# Patient Record
Sex: FEMALE | Race: WHITE | NOT HISPANIC OR LATINO | Employment: OTHER | URBAN - METROPOLITAN AREA
[De-identification: names, ages, dates, MRNs, and addresses within clinical notes are randomized per-mention and may not be internally consistent; named-entity substitution may affect disease eponyms.]

---

## 2017-02-01 RX ORDER — DONEPEZIL HYDROCHLORIDE 10 MG/1
TABLET, FILM COATED ORAL
Qty: 30 TABLET | Refills: 2 | Status: SHIPPED | OUTPATIENT
Start: 2017-02-01 | End: 2017-05-29 | Stop reason: SDUPTHER

## 2017-02-22 RX ORDER — ALENDRONATE SODIUM 70 MG/1
70 TABLET ORAL WEEKLY
Qty: 4 TABLET | Refills: 2 | Status: SHIPPED | OUTPATIENT
Start: 2017-02-22 | End: 2017-05-16 | Stop reason: SDUPTHER

## 2017-03-02 RX ORDER — PRAVASTATIN SODIUM 40 MG/1
TABLET ORAL
Qty: 30 TABLET | Refills: 0 | Status: SHIPPED | OUTPATIENT
Start: 2017-03-02 | End: 2017-04-21 | Stop reason: SDUPTHER

## 2017-03-08 ENCOUNTER — TELEPHONE (OUTPATIENT)
Dept: FAMILY MEDICINE | Facility: CLINIC | Age: 77
End: 2017-03-08

## 2017-03-08 DIAGNOSIS — Z12.31 ENCOUNTER FOR SCREENING MAMMOGRAM FOR MALIGNANT NEOPLASM OF BREAST: Primary | ICD-10-CM

## 2017-03-08 NOTE — TELEPHONE ENCOUNTER
----- Message from RT Wilner sent at 3/8/2017  7:51 AM CST -----  This pt is scheduled for a mammogram on 3-15-17.  There is no order in the system. Would you please check on this and put an order in and link to appt?  Please call if you have any questions #[phone]. Thank you for taking care of this.

## 2017-03-15 ENCOUNTER — HOSPITAL ENCOUNTER (OUTPATIENT)
Dept: RADIOLOGY | Facility: HOSPITAL | Age: 77
Discharge: HOME | End: 2017-03-15
Attending: FAMILY MEDICINE
Payer: MEDICARE

## 2017-03-15 DIAGNOSIS — Z12.31 ENCOUNTER FOR SCREENING MAMMOGRAM FOR MALIGNANT NEOPLASM OF BREAST: ICD-10-CM

## 2017-03-15 PROCEDURE — 77067 SCR MAMMO BI INCL CAD: CPT | Mod: TC

## 2017-03-15 PROCEDURE — 77067 SCR MAMMO BI INCL CAD: CPT | Mod: 26,,, | Performed by: RADIOLOGY

## 2017-03-15 PROCEDURE — 77063 BREAST TOMOSYNTHESIS BI: CPT | Mod: 26,,, | Performed by: RADIOLOGY

## 2017-04-07 ENCOUNTER — PATIENT OUTREACH (OUTPATIENT)
Dept: ADMINISTRATIVE | Facility: HOSPITAL | Age: 77
End: 2017-04-07

## 2017-04-21 ENCOUNTER — OFFICE VISIT (OUTPATIENT)
Dept: FAMILY MEDICINE | Facility: CLINIC | Age: 77
End: 2017-04-21
Payer: MEDICARE

## 2017-04-21 ENCOUNTER — LAB VISIT (OUTPATIENT)
Dept: LAB | Facility: HOSPITAL | Age: 77
End: 2017-04-21
Attending: FAMILY MEDICINE
Payer: MEDICARE

## 2017-04-21 VITALS
DIASTOLIC BLOOD PRESSURE: 82 MMHG | SYSTOLIC BLOOD PRESSURE: 135 MMHG | HEIGHT: 63 IN | TEMPERATURE: 98 F | WEIGHT: 149.94 LBS | RESPIRATION RATE: 18 BRPM | BODY MASS INDEX: 26.57 KG/M2 | HEART RATE: 78 BPM

## 2017-04-21 DIAGNOSIS — E78.5 HYPERLIPIDEMIA, UNSPECIFIED HYPERLIPIDEMIA TYPE: Primary | ICD-10-CM

## 2017-04-21 DIAGNOSIS — G31.84 MILD COGNITIVE IMPAIRMENT: ICD-10-CM

## 2017-04-21 DIAGNOSIS — Z78.0 POSTMENOPAUSAL: ICD-10-CM

## 2017-04-21 DIAGNOSIS — Z01.419 WELL FEMALE EXAM WITH ROUTINE GYNECOLOGICAL EXAM: ICD-10-CM

## 2017-04-21 DIAGNOSIS — M85.80 OSTEOPENIA, UNSPECIFIED LOCATION: ICD-10-CM

## 2017-04-21 DIAGNOSIS — E78.5 HYPERLIPIDEMIA, UNSPECIFIED HYPERLIPIDEMIA TYPE: ICD-10-CM

## 2017-04-21 LAB
ALBUMIN SERPL BCP-MCNC: 4.1 G/DL
ALP SERPL-CCNC: 57 U/L
ALT SERPL W/O P-5'-P-CCNC: 29 U/L
ANION GAP SERPL CALC-SCNC: 12 MMOL/L
AST SERPL-CCNC: 27 U/L
BASOPHILS # BLD AUTO: 0.11 K/UL
BASOPHILS NFR BLD: 1.6 %
BILIRUB SERPL-MCNC: 0.4 MG/DL
BUN SERPL-MCNC: 13 MG/DL
CALCIUM SERPL-MCNC: 9.4 MG/DL
CHLORIDE SERPL-SCNC: 106 MMOL/L
CHOLEST/HDLC SERPL: 3.4 {RATIO}
CO2 SERPL-SCNC: 22 MMOL/L
CREAT SERPL-MCNC: 1.1 MG/DL
DIFFERENTIAL METHOD: NORMAL
EOSINOPHIL # BLD AUTO: 0.4 K/UL
EOSINOPHIL NFR BLD: 5.5 %
ERYTHROCYTE [DISTWIDTH] IN BLOOD BY AUTOMATED COUNT: 14.1 %
EST. GFR  (AFRICAN AMERICAN): 56.4 ML/MIN/1.73 M^2
EST. GFR  (NON AFRICAN AMERICAN): 48.9 ML/MIN/1.73 M^2
GLUCOSE SERPL-MCNC: 88 MG/DL
HCT VFR BLD AUTO: 41.7 %
HDL/CHOLESTEROL RATIO: 29.5 %
HDLC SERPL-MCNC: 183 MG/DL
HDLC SERPL-MCNC: 54 MG/DL
HGB BLD-MCNC: 13.7 G/DL
LDLC SERPL CALC-MCNC: 92 MG/DL
LYMPHOCYTES # BLD AUTO: 1.8 K/UL
LYMPHOCYTES NFR BLD: 24.9 %
MCH RBC QN AUTO: 30 PG
MCHC RBC AUTO-ENTMCNC: 32.9 %
MCV RBC AUTO: 91 FL
MONOCYTES # BLD AUTO: 0.4 K/UL
MONOCYTES NFR BLD: 5.3 %
NEUTROPHILS # BLD AUTO: 4.4 K/UL
NEUTROPHILS NFR BLD: 62.4 %
NONHDLC SERPL-MCNC: 129 MG/DL
PLATELET # BLD AUTO: 261 K/UL
PMV BLD AUTO: 11 FL
POTASSIUM SERPL-SCNC: 4.3 MMOL/L
PROT SERPL-MCNC: 8.1 G/DL
RBC # BLD AUTO: 4.56 M/UL
SODIUM SERPL-SCNC: 140 MMOL/L
TRIGL SERPL-MCNC: 185 MG/DL
TSH SERPL DL<=0.005 MIU/L-ACNC: 2.85 UIU/ML
WBC # BLD AUTO: 7.03 K/UL

## 2017-04-21 PROCEDURE — 84443 ASSAY THYROID STIM HORMONE: CPT

## 2017-04-21 PROCEDURE — 36415 COLL VENOUS BLD VENIPUNCTURE: CPT | Mod: PO

## 2017-04-21 PROCEDURE — 80061 LIPID PANEL: CPT

## 2017-04-21 PROCEDURE — G0101 CA SCREEN;PELVIC/BREAST EXAM: HCPCS | Mod: S$PBB,,, | Performed by: FAMILY MEDICINE

## 2017-04-21 PROCEDURE — 99999 PR PBB SHADOW E&M-EST. PATIENT-LVL III: CPT | Mod: PBBFAC,,, | Performed by: FAMILY MEDICINE

## 2017-04-21 PROCEDURE — 80053 COMPREHEN METABOLIC PANEL: CPT

## 2017-04-21 PROCEDURE — 85025 COMPLETE CBC W/AUTO DIFF WBC: CPT

## 2017-04-21 PROCEDURE — 99397 PER PM REEVAL EST PAT 65+ YR: CPT | Mod: 25,S$PBB,, | Performed by: FAMILY MEDICINE

## 2017-04-21 NOTE — PROGRESS NOTES
CHIEF COMPLAINT: This is a 76 year old female here for preventive health exam.    SUBJECTIVE: Patient reports that she is doing well and has no complaints. She continues to take Aricept for mild cognitive impairment. She drives infrequently. 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  She exercises by walking with her  around the neighborhood.     Eye exam November 2016. Mammogram March 2017. Bone DEXA scan November 2013, due again in November 2016. Pap smear October 2009. Pelvic exam March 2016. Colonoscopy January 2009, due again in January 2019. Tdap January 2012. Pneumovax October 2008. Flu vaccine October 2015. Prevnar July 2015.     ROS:  GENERAL: Patient denies fever, chills, night sweats. Patient denies weight gain or loss. Patient denies anorexia, fatigue, weakness or swollen glands.  SKIN: Patient denies rash or hair loss.  HEENT: Patient denies sore throat, ear pain, hearing loss, nasal congestion, or runny nose. Patient denies visual disturbance, eye irritation or discharge.  LUNGS: Patient denies cough, wheeze or hemoptysis.  CARDIOVASCULAR: Patient denies chest pain, shortness of breath, palpitations, syncope or lower extremity edema.  GI: Patient denies abdominal pain, nausea, vomiting, diarrhea, constipation, blood in stool or melena.  GENITOURINARY: Patient denies pelvic pain, vaginal discharge, itch or odor. Patient denies irregular vaginal bleeding. Patient denies dysuria, frequency, hematuria, nocturia, urgency or incontinence.  BREASTS: Patient denies breast pain, mass or nipple discharge.  MUSCULOSKELETAL: Patient denies joint pain, swelling, redness or warmth.  NEUROLOGIC: Patient denies headache, vertigo, paresthesias, weakness in limb, dysarthria, dysphagia or abnormality of gait.  PSYCHIATRIC: Patient denies anxiety, depression, or memory loss.     OBJECTIVE:   GENERAL: Well-developed well-nourished, overweight  elderly, white female alert and oriented x3, in no acute distress.  Mild cognitive impairment.  No hallucinations or delusions  SKIN: Clear without rash. Normal color and tone.  HEENT: Eyes: Clear conjunctivae. No scleral icterus. Pupils equal reactive to light and accommodation. Ears: Hearing aids. Clear TMs. Clear canals. Nose: Without congestion. Pharynx: Without injection or exudates.  NECK: Supple, normal range of motion. No masses, lymphadenopathy or enlarged thyroid. No JVD. Carotids 2+ and equal. No bruits.  LUNGS: Clear to auscultation. Normal respiratory effort.  CARDIOVASCULAR: Regular rhythm, normal S1, S2 without murmur, gallop or rub.  BACK: No CVA or spinal tenderness.  BREASTS: No masses, tenderness or nipple discharge.  ABDOMEN: Normal appearance. Active bowel sounds. Soft, nontender without mass or organomegaly. No rebound or guarding.  EXTREMITIES: Without cyanosis, clubbing or edema. Distal pulses 2+ and equal. Normal range of motion in all extremities. No joint effusion, erythema or warmth.  NEUROLOGIC: Cranial nerves II through XII without deficit. Motor strength equal bilaterally. Sensation normal to touch. Deep tendon reflexes 2+ and equal. Gait without abnormality. No tremor. Negative cerebellar signs.  PELVIC: External: Without lesions or inflammation. Vaginal: Atrophic changes. Cervix: Nontender. Uterus: Normal size and shape. Adnexa: Non-tender, without masses. Rectovaginal: Confirms, heme-negative stool x2.     ASSESSMENT:  1. Hyperlipidemia, unspecified hyperlipidemia type    2. Mild cognitive impairment    3. Osteopenia, unspecified location    4. Postmenopausal      PLAN:   1.  Weight reduction.  Exercise regularly.  2.  Age-appropriate counseling.  3.  Fasting lab.  4.  Bone DEXA scan.

## 2017-04-21 NOTE — MR AVS SNAPSHOT
[facility]  [address]  [place] [address]  Phone: [phone]                  Vanessa Waddelldict   2017 9:00 AM   Office Visit    Description:  Female : 1940   Provider:  Magui Jc MD   Department:  [facility]           Reason for Visit     Annual Exam           Diagnoses this Visit        Comments    Hyperlipidemia, unspecified hyperlipidemia type    -  Primary     Mild cognitive impairment         Osteopenia, unspecified location         Postmenopausal                To Do List           Future Appointments        Provider Department Dept Phone    2017 11:10 AM LABORATORY, [address] [facility]  [phone]    2017 11:00 AM [facility] [phone]    2017 1:45 PM Marielena Reese, [facility] - Ophthalmology [phone]      Goals (5 Years of Data)     None      [facility] On Call     [facility] Nurse Care Line -  Assistance  Unless otherwise directed by your provider, please contact [facility], our nurse care line that is available for  assistance.     Registered nurses in the [facility] Center provide: appointment scheduling, clinical advisement, health education, and other advisory services.  Call: [phone] (toll free)               Medications           Message regarding Medications     Verify the changes and/or additions to your medication regime listed below are the same as discussed with your clinician today.  If any of these changes or additions are incorrect, please notify your healthcare provider.             Verify that the below list of medications is an accurate representation of the medications you are currently taking.  If none reported, the list may be blank. If incorrect, please contact your healthcare provider. Carry this list with you in case of emergency.           Current Medications     alendronate (FOSAMAX) 70 MG  "tablet Take 1 tablet (70 mg total) by mouth once a week.    aspirin (ECOTRIN) 81 MG EC tablet Take 81 mg by mouth once daily.    calcium carbonate-vitamin D2 600 mg calcium- 200 unit Cap Take by mouth.    donepezil (ARICEPT) 10 MG tablet TAKE ONE TABLET BY MOUTH ONE TIME DAILY (need annual exam)    pravastatin (PRAVACHOL) 40 MG tablet TAKE ONE TABLET BY MOUTH ONE TIME DAILY            Clinical Reference Information           Your Vitals Were     BP Pulse Temp Resp Height Weight    135/82 78 98 °F (36.7 °C) (Tympanic) 18 5' 2.5" (1.588 m) 68 kg (149 lb 14.6 oz)    BMI                26.98 kg/m2          Blood Pressure          Most Recent Value    BP  135/82      Allergies as of 4/21/2017     No Known Allergies      Immunizations Administered on Date of Encounter - 4/21/2017     None      Orders Placed During Today's Visit     Future Labs/Procedures Expected by Expires    CBC auto differential  4/21/2017 6/20/2018    Comprehensive metabolic panel  4/21/2017 6/20/2018    DXA Bone Density Spine And Hip  4/21/2017 4/21/2018    Lipid panel  4/21/2017 6/20/2018    TSH  4/21/2017 6/20/2018      Language Assistance Services     ATTENTION: Language assistance services are available, free of charge. Please call [phone].      ATENCIÓN: Si toyala aleksander, tiene a sprague disposición servicios gratuitos de asistencia lingüística. Llame al [phone].     [facility] Ý: N?u b?n nói Ti?ng Vi?t, có các d?ch v? h? tr? ngôn ng? mi?n phí dành cho b?n. G?i s? [phone].         [facility] complies with applicable Federal civil rights laws and does not discriminate on the basis of race, color, national origin, age, disability, or sex.        "

## 2017-04-22 RX ORDER — PRAVASTATIN SODIUM 40 MG/1
TABLET ORAL
Qty: 30 TABLET | Refills: 11 | Status: SHIPPED | OUTPATIENT
Start: 2017-04-22 | End: 2018-04-01 | Stop reason: SDUPTHER

## 2017-05-08 ENCOUNTER — APPOINTMENT (OUTPATIENT)
Dept: RADIOLOGY | Facility: CLINIC | Age: 77
End: 2017-05-08
Attending: FAMILY MEDICINE
Payer: MEDICARE

## 2017-05-08 DIAGNOSIS — Z78.0 POSTMENOPAUSAL: ICD-10-CM

## 2017-05-08 PROCEDURE — 77080 DXA BONE DENSITY AXIAL: CPT | Mod: 26,,, | Performed by: INTERNAL MEDICINE

## 2017-05-08 PROCEDURE — 77080 DXA BONE DENSITY AXIAL: CPT | Mod: TC,PO

## 2017-05-16 RX ORDER — ALENDRONATE SODIUM 70 MG/1
70 TABLET ORAL WEEKLY
Qty: 4 TABLET | Refills: 11 | Status: SHIPPED | OUTPATIENT
Start: 2017-05-16 | End: 2018-05-08 | Stop reason: SDUPTHER

## 2017-05-29 RX ORDER — DONEPEZIL HYDROCHLORIDE 10 MG/1
TABLET, FILM COATED ORAL
Qty: 30 TABLET | Refills: 11 | Status: SHIPPED | OUTPATIENT
Start: 2017-05-29 | End: 2018-05-18 | Stop reason: SDUPTHER

## 2017-08-18 ENCOUNTER — OFFICE VISIT (OUTPATIENT)
Dept: FAMILY MEDICINE | Facility: CLINIC | Age: 77
End: 2017-08-18
Payer: MEDICARE

## 2017-08-18 VITALS
TEMPERATURE: 99 F | WEIGHT: 149.69 LBS | HEART RATE: 74 BPM | DIASTOLIC BLOOD PRESSURE: 87 MMHG | SYSTOLIC BLOOD PRESSURE: 135 MMHG | BODY MASS INDEX: 26.52 KG/M2 | HEIGHT: 63 IN | RESPIRATION RATE: 18 BRPM

## 2017-08-18 DIAGNOSIS — F03.90 DEMENTIA WITHOUT BEHAVIORAL DISTURBANCE, UNSPECIFIED DEMENTIA TYPE: Primary | ICD-10-CM

## 2017-08-18 DIAGNOSIS — M85.80 OSTEOPENIA, UNSPECIFIED LOCATION: ICD-10-CM

## 2017-08-18 DIAGNOSIS — N18.30 CKD (CHRONIC KIDNEY DISEASE), STAGE III: ICD-10-CM

## 2017-08-18 DIAGNOSIS — E78.2 MIXED HYPERLIPIDEMIA: ICD-10-CM

## 2017-08-18 PROCEDURE — 1126F AMNT PAIN NOTED NONE PRSNT: CPT | Mod: ,,, | Performed by: FAMILY MEDICINE

## 2017-08-18 PROCEDURE — 99999 PR PBB SHADOW E&M-EST. PATIENT-LVL III: CPT | Mod: PBBFAC,,, | Performed by: FAMILY MEDICINE

## 2017-08-18 PROCEDURE — 1159F MED LIST DOCD IN RCRD: CPT | Mod: ,,, | Performed by: FAMILY MEDICINE

## 2017-08-18 PROCEDURE — 99214 OFFICE O/P EST MOD 30 MIN: CPT | Mod: S$PBB,,, | Performed by: FAMILY MEDICINE

## 2017-08-18 PROCEDURE — 99213 OFFICE O/P EST LOW 20 MIN: CPT | Mod: PBBFAC,PO | Performed by: FAMILY MEDICINE

## 2017-08-18 NOTE — PROGRESS NOTES
CHIEF COMPLAINT: This is a 77-year-old female here for follow-up of chronic medical conditions.    SUBJECTIVE: Patient reports that she is doing well and has no complaints. She continues to take Aricept for.  Dementia. She reports that she drives infrequently. 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  She exercises by walking with her  around the neighborhood every day.  She is taking pravastatin 40 mg daily for hyperlipidemia and alendronate 70 mg weekly for osteopenia.  She has chronic kidney disease stage III.     ROS:  GENERAL: Patient denies fever, chills, night sweats. Patient denies weight gain or loss. Patient denies anorexia, fatigue, weakness or swollen glands.  SKIN: Patient denies rash or hair loss.  HEENT: Patient denies sore throat, ear pain, hearing loss, nasal congestion, or runny nose. Patient denies visual disturbance, eye irritation or discharge.  LUNGS: Patient denies cough, wheeze or hemoptysis.  CARDIOVASCULAR: Patient denies chest pain, shortness of breath, palpitations, syncope or lower extremity edema.  GI: Patient denies abdominal pain, nausea, vomiting, diarrhea, constipation, blood in stool or melena.  GENITOURINARY: Patient denies pelvic pain, vaginal discharge, itch or odor. Patient denies irregular vaginal bleeding. Patient denies dysuria, frequency, hematuria, nocturia, urgency or incontinence.  BREASTS: Patient denies breast pain, mass or nipple discharge.  MUSCULOSKELETAL: Patient denies joint pain, swelling, redness or warmth.  NEUROLOGIC: Patient denies headache, vertigo, paresthesias, weakness in limb, dysarthria, dysphagia or abnormality of gait.  PSYCHIATRIC: Patient denies anxiety, depression, or memory loss.     OBJECTIVE:   GENERAL: Well-developed well-nourished, overweight elderly, white female alert and oriented x3, in no acute distress.  Mild cognitive impairment.  No hallucinations or  delusions  SKIN: Clear without rash. Normal color and tone.  HEENT: Eyes: Clear conjunctivae. No scleral icterus. Pupils equal reactive to light and accommodation. Ears: Hearing aids. Clear TMs. Clear canals. Nose: Without congestion. Pharynx: Without injection or exudates.  NECK: Supple, normal range of motion. No masses, lymphadenopathy or enlarged thyroid. No JVD. Carotids 2+ and equal. No bruits.  LUNGS: Clear to auscultation. Normal respiratory effort.  CARDIOVASCULAR: Regular rhythm, normal S1, S2 without murmur, gallop or rub.  BACK: No CVA or spinal tenderness.  BREASTS: No masses, tenderness or nipple discharge.  ABDOMEN: Normal appearance. Active bowel sounds. Soft, nontender without mass or organomegaly. No rebound or guarding.  EXTREMITIES: Without cyanosis, clubbing or edema. Distal pulses 2+ and equal. Normal range of motion in all extremities. No joint effusion, erythema or warmth.  NEUROLOGIC: Cranial nerves II through XII without deficit. Motor strength equal bilaterally. Sensation normal to touch. Deep tendon reflexes 2+ and equal. Gait without abnormality. No tremor. Negative cerebellar signs.  PELVIC: Deferred to 2018.    ASSESSMENT:  1. Dementia without behavioral disturbance, unspecified dementia type    2. CKD (chronic kidney disease), stage III    3. Mixed hyperlipidemia    4. Osteopenia, unspecified location      PLAN:   1.  Weight reduction.  Exercise regularly.  2.  Age-appropriate counseling.  3.  Recent lab reviewed and acceptable.  4.  Bone DEXA scan May 2017 showed osteopenia with stable to improved bone mineral density.  Recommendation was to stop Fosamax for a drug holiday.

## 2017-08-21 ENCOUNTER — OFFICE VISIT (OUTPATIENT)
Dept: FAMILY MEDICINE | Facility: CLINIC | Age: 77
End: 2017-08-21
Payer: MEDICARE

## 2017-08-21 VITALS
BODY MASS INDEX: 26.17 KG/M2 | WEIGHT: 147.69 LBS | DIASTOLIC BLOOD PRESSURE: 70 MMHG | SYSTOLIC BLOOD PRESSURE: 144 MMHG | HEIGHT: 63 IN | OXYGEN SATURATION: 96 %

## 2017-08-21 DIAGNOSIS — Z00.00 ENCOUNTER FOR PREVENTIVE HEALTH EXAMINATION: Primary | ICD-10-CM

## 2017-08-21 PROCEDURE — 99999 PR PBB SHADOW E&M-EST. PATIENT-LVL III: CPT | Mod: PBBFAC,,, | Performed by: NURSE PRACTITIONER

## 2017-08-21 PROCEDURE — G0438 PPPS, INITIAL VISIT: HCPCS | Mod: ,,, | Performed by: NURSE PRACTITIONER

## 2017-08-21 PROCEDURE — 99213 OFFICE O/P EST LOW 20 MIN: CPT | Mod: PBBFAC,PO | Performed by: NURSE PRACTITIONER

## 2017-08-21 NOTE — PATIENT INSTRUCTIONS
Counseling and Referral of Other Preventative  (Italic type indicates deductible and co-insurance are waived)    Patient Name: Vanessa Vazquez  Today's Date: 8/21/2017      SERVICE LIMITATIONS RECOMMENDATION    Vaccines    · Pneumococcal (once after 65)    · Influenza (annually)    · Hepatitis B (if medium/high risk)    · Prevnar 13      Hepatitis B medium/high risk factors:       - End-stage renal disease       - Hemophiliacs who received Factor VII or         IX concentrates       - Clients of institutions for the mentally             retarded       - Persons who live in the same house as          a HepB carrier       - Homosexual men       - Illicit injectable drug abusers     Pneumococcal: Done, no repeat necessary     Influenza: Done, repeat in one year     Hepatitis B:N/A     Prevnar 13: Done, no repeat necessary    Mammogram (biennial age 50-74)  Annually (age 40 or over)  Done this year, repeat every year    Pap (up to age 70 and after 70 if unknown history or abnormal study last 10 years)    N/A     The USPSTF recommends against screening for cervical cancer in women older than age 65 years who have had adequate prior screening and are not otherwise at high risk for cervical cancer.      Colorectal cancer screening (to age 75)    · Fecal occult blood test (annual)  · Flexible sigmoidoscopy (5y)  · Screening colonoscopy (10y)  · Barium enema   N/A    Diabetes self-management training (no USPSTF recommendations)  Requires referral by treating physician for patient with diabetes or renal disease. 10 hours of initial DSMT sessions of no less than 30 minutes each in a continuous 12-month period. 2 hours of follow-up DSMT in subsequent years.  N/A    Bone mass measurements (age 65 & older, biennial)  Requires diagnosis related to osteoporosis or estrogen deficiency. Biennial benefit unless patient has history of long-term glucocorticoid  5/8/2017 DUE 2020    Glaucoma screening (no USPSTF recommendation)   Diabetes mellitus, family history   , age 50 or over    American, age 65 or over  Done this year, repeat every year    Medical nutrition therapy for diabetes or renal disease (no recommended schedule)  Requires referral by treating physician for patient with diabetes or renal disease or kidney transplant within the past 3 years.  Can be provided in same year as diabetes self-management training (DSMT), and CMS recommends medical nutrition therapy take place after DSMT. Up to 3 hours for initial year and 2 hours in subsequent years.  N/A    Cardiovascular screening blood tests (every 5 years)  · Fasting lipid panel  Order as a panel if possible  Done this year, repeat every year    Diabetes screening tests (at least every 3 years, Medicare covers annually or at 6-month intervals for prediabetic patients)  · Fasting blood sugar (FBS) or glucose tolerance test (GTT)  Patient must be diagnosed with one of the following:       - Hypertension       - Dyslipidemia       - Obesity (BMI 30kg/m2)       - Previous elevated impaired FBS or GTT       ... or any two of the following:       - Overweight (BMI 25 but <30)       - Family history of diabetes       - Age 65 or older       - History of gestational diabetes or birth of baby weighing more than 9 pounds  Done this year, repeat every year    HIV screening (annually for increased risk patients)  · HIV-1 and HIV-2 by EIA, or MERCEDES, rapid antibody test or oral mucosa transudate  Patients must be at increased risk for HIV infection per USPSTF guidelines or pregnant. Tests covered annually for patient at increased risk or as requested by the patient. Pregnant patients may receive up to 3 tests during pregnancy.  Risks discussed, screening is not recommended    Smoking cessation counseling (up to 8 sessions per year)  Patients must be asymptomatic of tobacco-related conditions to receive as a preventative service.  Non-smoker    Subsequent annual wellness  visit  At least 12 months since last AWV  Return in one year     The following information is provided to all patients.  This information is to help you find resources for any of the problems found today that may be affecting your health:                Living healthy guide: www.[facility].[place].[facility]      Understanding Diabetes: www.diabetes.org      Eating healthy: www.cdc.gov/healthyweight      [facility] home safety checklist: www.cdc.gov/steadi/patient.html      Agency on Aging: www.goea.[place].[facility]      Alcoholics anonymous (AA): www.aa.org      Physical Activity: www.paul.nih.gov/iq9xsgn      Tobacco use: www.quitwithusla.org

## 2017-08-21 NOTE — PROGRESS NOTES
"Vanessa Vazquez presented for an initial Medicare AWV today. The following components were reviewed and updated:    · Medical history  · Family History  · Social history  · Allergies and Current Medications  · Health Risk Assessment  · Health Maintenance  · Care Team    **See Completed Assessments for Annual Wellness visit with in the encounter summary    The following assessments were completed:  · Depression Screening  · Cognitive function Screening  · Timed Get Up Test  · Whisper Test    Vitals:    08/21/17 1304   BP: (!) 144/70   BP Location: Left arm   Patient Position: Sitting   SpO2: 96%   Weight: 67 kg (147 lb 11.3 oz)   Height: 5' 2.5" (1.588 m)     Body mass index is 26.59 kg/m².  Waist Measurements: 39 in]        Diagnoses and health risks identified today and associated recommendations/orders:  1. Encounter for preventive health examination    Pt up to date in health maintenance  With no new problems today.    Provided Vanessa LEES with a 5-10 year written screening schedule and personal prevention plan. Recommendations were developed using the USPSTF age appropriate recommendations. Education, counseling, and referrals were provided as needed.  After Visit Summary printed and given to patient which includes a list of additional screenings\tests needed.    Return in about 1 year (around 8/21/2018).      Lorri Fairbanks NP  "

## 2017-08-21 NOTE — Clinical Note
Your patient was seen today for a AW visit. . I have included a copy of my visit note, please review the note and feel free to contact me with any questions.  Thank you for allowing me to participate in the care of your patients.  Lorri Fairbanks NP

## 2017-11-08 ENCOUNTER — IMMUNIZATION (OUTPATIENT)
Dept: FAMILY MEDICINE | Facility: CLINIC | Age: 77
End: 2017-11-08
Payer: MEDICARE

## 2017-11-08 PROCEDURE — G0008 ADMIN INFLUENZA VIRUS VAC: HCPCS | Mod: PBBFAC,PO

## 2017-11-20 ENCOUNTER — OFFICE VISIT (OUTPATIENT)
Dept: OPHTHALMOLOGY | Facility: CLINIC | Age: 77
End: 2017-11-20
Payer: MEDICARE

## 2017-11-20 DIAGNOSIS — H52.03 HYPEROPIA WITH PRESBYOPIA, BILATERAL: ICD-10-CM

## 2017-11-20 DIAGNOSIS — Z83.511 FAMILY HISTORY OF GLAUCOMA: Primary | ICD-10-CM

## 2017-11-20 DIAGNOSIS — H25.013 CATARACT CORTICAL, SENILE, BILATERAL: ICD-10-CM

## 2017-11-20 DIAGNOSIS — H52.4 HYPEROPIA WITH PRESBYOPIA, BILATERAL: ICD-10-CM

## 2017-11-20 PROCEDURE — 92015 DETERMINE REFRACTIVE STATE: CPT | Mod: ,,, | Performed by: OPTOMETRIST

## 2017-11-20 PROCEDURE — 92014 COMPRE OPH EXAM EST PT 1/>: CPT | Mod: S$PBB,,, | Performed by: OPTOMETRIST

## 2017-11-20 PROCEDURE — 99999 PR PBB SHADOW E&M-EST. PATIENT-LVL II: CPT | Mod: PBBFAC,,, | Performed by: OPTOMETRIST

## 2017-11-20 PROCEDURE — 99212 OFFICE O/P EST SF 10 MIN: CPT | Mod: PBBFAC,PO | Performed by: OPTOMETRIST

## 2018-04-01 RX ORDER — PRAVASTATIN SODIUM 40 MG/1
TABLET ORAL
Qty: 30 TABLET | Refills: 3 | Status: SHIPPED | OUTPATIENT
Start: 2018-04-01 | End: 2018-08-15 | Stop reason: SDUPTHER

## 2018-04-10 ENCOUNTER — TELEPHONE (OUTPATIENT)
Dept: FAMILY MEDICINE | Facility: CLINIC | Age: 78
End: 2018-04-10

## 2018-04-10 DIAGNOSIS — Z12.31 ENCOUNTER FOR SCREENING MAMMOGRAM FOR MALIGNANT NEOPLASM OF BREAST: Primary | ICD-10-CM

## 2018-04-10 NOTE — TELEPHONE ENCOUNTER
----- Message from Isaura Fairbanks sent at 4/10/2018 10:04 AM CDT -----  Contact:   Request a call to schedule a mammo appt, no orders in the system, the pt can be reached at [phone]///[email or web address]

## 2018-04-27 ENCOUNTER — HOSPITAL ENCOUNTER (OUTPATIENT)
Dept: RADIOLOGY | Facility: HOSPITAL | Age: 78
Discharge: HOME | End: 2018-04-27
Attending: FAMILY MEDICINE
Payer: MEDICARE

## 2018-04-27 VITALS — WEIGHT: 147 LBS | BODY MASS INDEX: 27.05 KG/M2 | HEIGHT: 62 IN

## 2018-04-27 DIAGNOSIS — Z12.31 ENCOUNTER FOR SCREENING MAMMOGRAM FOR MALIGNANT NEOPLASM OF BREAST: ICD-10-CM

## 2018-04-27 PROCEDURE — 77063 BREAST TOMOSYNTHESIS BI: CPT | Mod: 26,,, | Performed by: RADIOLOGY

## 2018-04-27 PROCEDURE — 77067 SCR MAMMO BI INCL CAD: CPT | Mod: 26,,, | Performed by: RADIOLOGY

## 2018-04-27 PROCEDURE — 77067 SCR MAMMO BI INCL CAD: CPT | Mod: TC,PO

## 2018-05-08 RX ORDER — ALENDRONATE SODIUM 70 MG/1
TABLET ORAL
Qty: 4 TABLET | Refills: 3 | Status: SHIPPED | OUTPATIENT
Start: 2018-05-08 | End: 2018-08-14

## 2018-05-09 ENCOUNTER — HOSPITAL ENCOUNTER (OUTPATIENT)
Dept: RADIOLOGY | Facility: HOSPITAL | Age: 78
Discharge: HOME | End: 2018-05-09
Attending: FAMILY MEDICINE
Payer: MEDICARE

## 2018-05-09 DIAGNOSIS — R92.8 ABNORMAL MAMMOGRAM: ICD-10-CM

## 2018-05-09 PROCEDURE — 77065 DX MAMMO INCL CAD UNI: CPT | Mod: 26,LT,, | Performed by: RADIOLOGY

## 2018-05-09 PROCEDURE — 77061 BREAST TOMOSYNTHESIS UNI: CPT | Mod: 26,LT,, | Performed by: RADIOLOGY

## 2018-05-09 PROCEDURE — 76642 ULTRASOUND BREAST LIMITED: CPT | Mod: 26,LT,, | Performed by: RADIOLOGY

## 2018-05-09 PROCEDURE — 77065 DX MAMMO INCL CAD UNI: CPT | Mod: TC,PO,LT

## 2018-05-09 PROCEDURE — 77061 BREAST TOMOSYNTHESIS UNI: CPT | Mod: TC,PO,LT

## 2018-05-09 PROCEDURE — 76642 ULTRASOUND BREAST LIMITED: CPT | Mod: TC,PO,LT

## 2018-05-10 ENCOUNTER — OFFICE VISIT (OUTPATIENT)
Dept: SURGERY | Facility: CLINIC | Age: 78
End: 2018-05-10
Payer: MEDICARE

## 2018-05-10 VITALS
HEART RATE: 72 BPM | HEIGHT: 62 IN | TEMPERATURE: 99 F | DIASTOLIC BLOOD PRESSURE: 70 MMHG | BODY MASS INDEX: 27.92 KG/M2 | SYSTOLIC BLOOD PRESSURE: 144 MMHG | WEIGHT: 151.69 LBS

## 2018-05-10 DIAGNOSIS — R92.8 ABNORMAL MAMMOGRAM OF LEFT BREAST: Primary | ICD-10-CM

## 2018-05-10 PROCEDURE — 99213 OFFICE O/P EST LOW 20 MIN: CPT | Mod: PBBFAC,PO | Performed by: SURGERY

## 2018-05-10 PROCEDURE — 99203 OFFICE O/P NEW LOW 30 MIN: CPT | Mod: S$PBB,,, | Performed by: SURGERY

## 2018-05-10 PROCEDURE — 99999 PR PBB SHADOW E&M-EST. PATIENT-LVL III: CPT | Mod: PBBFAC,,, | Performed by: SURGERY

## 2018-05-10 NOTE — PATIENT INSTRUCTIONS
You need to have the ultrasound-guided biopsy of her left breast.  Once we get the results with you and your  to call to discuss whether surgery is needed.      Do not take her aspirin again until the day after the biopsy       your biopsy is scheduled for Thursday May 17

## 2018-05-10 NOTE — LETTER
May 10, 2018      Magui Jc MD  [address]  [place] [address]           [facility]  [address]  [place] [address]  Phone: [phone]  Fax: [phone]          Patient: Vanessa Vazquez   MR Number: [id number]   YOB: 1940   Date of Visit: 5/10/2018       Dear Dr. Magui Jc:    Thank you for referring Vanessa Vazquez to me for evaluation. Attached you will find relevant portions of my assessment and plan of care.    If you have questions, please do not hesitate to call me. I look forward to following Vanessa Vazquez along with you.    Sincerely,    Lauri Jackson MD    Enclosure  CC:  No Recipients    If you would like to receive this communication electronically, please contact [email or web address] or [phone] to request more information on [employer] access.    For providers and/or their staff who would like to refer a patient to [facility], please contact us through our one-stop-shop provider referral line, [facility], at [phone].    If you feel you have received this communication in error or would no longer like to receive these types of communications, please e-mail [email or web address]

## 2018-05-10 NOTE — PROGRESS NOTES
Patient ID: Vanessa Vazquez is a 77 y.o. female.    Chief Complaint: Consult      HPI:   Patient received a letter in the mail saying was time for annual mammogram.  She underwent screening mammography that showed a cyst in the right breast and a 8 mm solid mass in the left breast.  She presents now to discuss options.  She has no breast complaints.  The patient does have some underlying dementia due to a stroke in the past.        Review of Systems   Constitutional: Negative for appetite change, chills, fatigue, fever and unexpected weight change.   HENT: Negative for hearing loss and rhinorrhea.    Eyes: Negative for visual disturbance.   Respiratory: Negative for apnea, cough, shortness of breath and wheezing.    Cardiovascular: Negative for chest pain and palpitations.   Gastrointestinal: Negative for abdominal distention, abdominal pain, blood in stool, constipation, diarrhea, nausea and vomiting.   Genitourinary: Negative for dysuria, frequency and urgency.   Musculoskeletal: Negative for arthralgias and neck pain.   Skin: Negative for rash.   Neurological: Negative for seizures, weakness, numbness and headaches.   Hematological: Negative for adenopathy. Does not bruise/bleed easily.   Psychiatric/Behavioral: Negative for hallucinations. The patient is not nervous/anxious.        Current Outpatient Prescriptions   Medication Sig Dispense Refill    alendronate (FOSAMAX) 70 MG tablet TAKE ONE TABLET BY MOUTH WEEKLY  4 tablet 3    aspirin (ECOTRIN) 81 MG EC tablet Take 81 mg by mouth once daily.      calcium carbonate-vitamin D2 600 mg calcium- 200 unit Cap Take by mouth.      donepezil (ARICEPT) 10 MG tablet TAKE ONE TABLET BY MOUTH ONE TIME DAILY (need annual exam) 30 tablet 11    glucosam/chond/hyalu/CF borate (MOVE FREE JOINT HEALTH ORAL) Take by mouth.      pravastatin (PRAVACHOL) 40 MG tablet TAKE ONE TABLET BY MOUTH ONE TIME DAILY. patient needs to schedule annual physical exam 30 tablet 3     No  current facility-administered medications for this visit.        Review of patient's allergies indicates:  No Known Allergies    Past Medical History:   Diagnosis Date    Cataract     CKD (chronic kidney disease), stage III     Dementia     Fibrocystic breast disease     Hearing loss     Hyperlipidemia     Osteoarthritis of thumb     Osteopenia        Past Surgical History:   Procedure Laterality Date    BREAST BIOPSY      benign, when?, maybe the lt breast       Family History   Problem Relation Age of Onset    Glaucoma Brother     Hyperlipidemia Mother     Heart disease Father     Osteoporosis Cousin     Coronary artery disease Maternal Uncle        Social History     Social History    Marital status:      Spouse name: N/A    Number of children: N/A    Years of education: N/A     Occupational History    Not on file.     Social History Main Topics    Smoking status: Never Smoker    Smokeless tobacco: Never Used    Alcohol use No    Drug use: No    Sexual activity: No     Other Topics Concern    Not on file     Social History Narrative    The patient is  and the mother of 2 adult children. She retired from [email or web address] as .  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  She drives occasionally.       Vitals:    05/10/18 1621   BP: (!) 144/70   Pulse: 72   Temp: 98.9 °F (37.2 °C)     menses: 14  First child 25  Breast feeding yes  Menopause unsure  hrt no    Physical Exam   Constitutional: She is oriented to person, place, and time. She appears well-developed and well-nourished.   HENT:   Head: Normocephalic.   Eyes: EOM are normal. Pupils are equal, round, and reactive to light.   Neck: No JVD present. No tracheal deviation present. No thyromegaly present.   Cardiovascular: Normal rate, regular rhythm and normal heart sounds.    Pulmonary/Chest: Breath sounds normal. She has no wheezes.   Abdominal:  Soft. Bowel sounds are normal. She exhibits no distension and no mass. There is no hepatosplenomegaly. There is no tenderness. There is no rigidity, no rebound and no guarding.   Musculoskeletal: Normal range of motion. She exhibits no edema.   Lymphadenopathy:     She has no cervical adenopathy.   Neurological: She is oriented to person, place, and time.   Skin: Skin is warm and dry. No rash noted. No erythema.   Bilateral breast exam was performed.  The breasts are symmetrical in size and shape.  There are no skin changes, masses, nipple discharge nor axillary adenopathy bilaterally     Psychiatric: She has a normal mood and affect.       Mammogram and ultrasounds were reviewed    Assessment & Plan:     8 mm lesion in the left breast suspicious for  Breast cancer     ultrasound-guided biopsy.  Once the biopsy results are completed the patient and her  will be notified of the results.  If the biopsy is benign no surgical intervention will be needed and repeat mammogram in 6 months may be recommended.  This will need to be discussed with her primary care doctor as she is over 75 years of age.      If the biopsy  Require further surgical intervention a follow-up appointment will be arranged.

## 2018-05-17 ENCOUNTER — HOSPITAL ENCOUNTER (OUTPATIENT)
Dept: RADIOLOGY | Facility: HOSPITAL | Age: 78
Discharge: HOME | End: 2018-05-17
Attending: SURGERY
Payer: MEDICARE

## 2018-05-17 DIAGNOSIS — R92.8 ABNORMAL MAMMOGRAM OF LEFT BREAST: ICD-10-CM

## 2018-05-17 PROCEDURE — 88305 TISSUE EXAM BY PATHOLOGIST: CPT | Mod: 26,,, | Performed by: PATHOLOGY

## 2018-05-17 PROCEDURE — 19083 BX BREAST 1ST LESION US IMAG: CPT | Mod: PO

## 2018-05-17 PROCEDURE — 19083 BX BREAST 1ST LESION US IMAG: CPT | Mod: LT,,, | Performed by: RADIOLOGY

## 2018-05-17 PROCEDURE — 88305 TISSUE EXAM BY PATHOLOGIST: CPT | Performed by: PATHOLOGY

## 2018-05-17 NOTE — NURSING
Pressure held on left breast biopsy site for 45 mins, hemostasis was achieved, steri strips were applied, and wound was covered with 4x4 gauze and a tegaderm. Dressing clean, dry and intact with no drainage noted.  Discharge instructions given verbally and in writing, patient voiced understandings.  Patient discharged and accompanied by family member.

## 2018-05-18 RX ORDER — DONEPEZIL HYDROCHLORIDE 10 MG/1
TABLET, FILM COATED ORAL
Qty: 30 TABLET | Refills: 3 | Status: SHIPPED | OUTPATIENT
Start: 2018-05-18 | End: 2018-09-12 | Stop reason: SDUPTHER

## 2018-05-22 ENCOUNTER — TELEPHONE (OUTPATIENT)
Dept: SURGERY | Facility: HOSPITAL | Age: 78
End: 2018-05-22

## 2018-05-22 NOTE — TELEPHONE ENCOUNTER
FINAL PATHOLOGIC DIAGNOSIS  Left breast, upper outer quadrant, core biopsy:  Benign breast tissue with focal fibrocystic changes including usual ductal hyperplasia, microcysts and stromal  fibrosis.  Focal area of fat necrosis with foreign-body giant cell reaction and hemosiderin is also present.  No evidence of atypia or malignancy.    Information provided to the patient

## 2018-05-24 DIAGNOSIS — N60.99 DUCTAL HYPERPLASIA OF BREAST: ICD-10-CM

## 2018-05-24 DIAGNOSIS — N64.1 FAT NECROSIS OF FEMALE BREAST: ICD-10-CM

## 2018-05-24 DIAGNOSIS — Z87.898 HISTORY OF ABNORMAL MAMMOGRAM: Primary | ICD-10-CM

## 2018-05-24 DIAGNOSIS — N60.92 ATYPICAL DUCTAL HYPERPLASIA OF LEFT BREAST: ICD-10-CM

## 2018-08-13 NOTE — PROGRESS NOTES
"DR Jc.  My wife, Vanessa, has an appointment with you Tuesday aft. Please cover a few things with her. If isn't too much trouble, put some of the answers in writing. The first - "pills." Vanessa has been taking a few over-the-counter vitamins/supplements for several years. Please let  us know just what she should be taking as these things have "evolved" or changed. For example, long ago it was recommended that she take Yenni Move Free Glucosomine Chondroitin Complex. Yenni no longer recommends it, but instead promotes "Ultra" Triple Action Collagen+Boron+HA and I have read a couple reports that the earlier drug isn't real effective anyhow. She has also been taking a Calcium Citrate supplement. Also a Vitamin D3 supplement. Both of these have been confusing at times as the drug stores aren't consistent in the exact type they carry.  For example, sometimes they carry D3 2000 IU, sometimes D3 3000 IU. This stuff isn't cheap. Also - please give her a serious talk about bladder control   and/or it is related to weight and exercise. Shouldn't she wear depends or something? Aren't there exercises she can do that aid in controlling the bladder? She won't remember a lot of what you tell her which is why I'm asking it be put in writing and given to her or me when we are there. Thanks ever so much. Lio Vazquez.           "

## 2018-08-14 ENCOUNTER — OFFICE VISIT (OUTPATIENT)
Dept: FAMILY MEDICINE | Facility: CLINIC | Age: 78
End: 2018-08-14
Payer: MEDICARE

## 2018-08-14 VITALS
DIASTOLIC BLOOD PRESSURE: 72 MMHG | OXYGEN SATURATION: 95 % | BODY MASS INDEX: 27.67 KG/M2 | HEIGHT: 62 IN | HEART RATE: 78 BPM | SYSTOLIC BLOOD PRESSURE: 128 MMHG | RESPIRATION RATE: 18 BRPM | TEMPERATURE: 98 F | WEIGHT: 150.38 LBS

## 2018-08-14 DIAGNOSIS — N18.30 CKD (CHRONIC KIDNEY DISEASE), STAGE III: Primary | ICD-10-CM

## 2018-08-14 DIAGNOSIS — M85.80 OSTEOPENIA, UNSPECIFIED LOCATION: ICD-10-CM

## 2018-08-14 DIAGNOSIS — E78.2 MIXED HYPERLIPIDEMIA: ICD-10-CM

## 2018-08-14 DIAGNOSIS — H91.93 BILATERAL HEARING LOSS, UNSPECIFIED HEARING LOSS TYPE: ICD-10-CM

## 2018-08-14 DIAGNOSIS — F03.90 DEMENTIA WITHOUT BEHAVIORAL DISTURBANCE, UNSPECIFIED DEMENTIA TYPE: ICD-10-CM

## 2018-08-14 PROCEDURE — 99999 PR PBB SHADOW E&M-EST. PATIENT-LVL IV: CPT | Mod: PBBFAC,,, | Performed by: FAMILY MEDICINE

## 2018-08-14 PROCEDURE — 99214 OFFICE O/P EST MOD 30 MIN: CPT | Mod: S$PBB,,, | Performed by: FAMILY MEDICINE

## 2018-08-14 PROCEDURE — 99214 OFFICE O/P EST MOD 30 MIN: CPT | Mod: PBBFAC,PO | Performed by: FAMILY MEDICINE

## 2018-08-14 NOTE — PROGRESS NOTES
CHIEF COMPLAINT:  This is a 78-year-old female here for preventive health exam.    SUBJECTIVE: Patient reports that she is doing well and has no complaints. She continues to take Aricept for dementia. She no longer drives . 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  She exercises by walking with her  around the neighborhood.  Her  reports urinary incontinence but patient does not feel that this problem occurs often enough to wear Depends.  The patient has osteopenia.  Bone DEXA scan in May 2017 showed improved bone mineral density.  Patient was advised to discontinue Fosamax but she has not done so.     Eye exam November 2016. Mammogram April 2018. Bone DEXA scan May 2017, due again in May 2020.  Pap smear October 2009.  Colonoscopy January 2009, due again in January 2019. Tdap January 2012. Pneumovax October 2008.  Prevnar July 2015.  Influenza vaccine November 2017.     ROS:  GENERAL: Patient denies fever, chills, night sweats. Patient denies weight gain or loss. Patient denies anorexia, fatigue, weakness or swollen glands.  SKIN: Patient denies rash or hair loss.  HEENT: Patient denies sore throat, ear pain, hearing loss, nasal congestion, or runny nose. Patient denies visual disturbance, eye irritation or discharge.  LUNGS: Patient denies cough, wheeze or hemoptysis.  CARDIOVASCULAR: Patient denies chest pain, shortness of breath, palpitations, syncope or lower extremity edema.  GI: Patient denies abdominal pain, nausea, vomiting, diarrhea, constipation, blood in stool or melena.  GENITOURINARY: Patient denies pelvic pain, vaginal discharge, itch or odor. Patient denies irregular vaginal bleeding. Patient denies dysuria, frequency, hematuria, nocturia, urgency or incontinence.  BREASTS: Patient denies breast pain, mass or nipple discharge.  MUSCULOSKELETAL: Patient denies joint pain, swelling, redness or warmth.  NEUROLOGIC: Patient  denies headache, vertigo, paresthesias, weakness in limb, dysarthria, dysphagia or abnormality of gait.  PSYCHIATRIC: Patient denies anxiety, depression, or memory loss.     OBJECTIVE:   GENERAL: Well-developed well-nourished, overweight elderly, white female alert and oriented x3, in no acute distress.  Mild to moderate cognitive impairment.  No hallucinations or delusions.  Weight gain of 3 lb in the last year.  SKIN: Clear without rash. Normal color and tone.  HEENT: Eyes: Clear conjunctivae. No scleral icterus. Pupils equal reactive to light and accommodation. Ears: Hearing aids. Clear TMs. Clear canals. Nose: Without congestion. Pharynx: Without injection or exudates.  NECK: Supple, normal range of motion. No masses, lymphadenopathy or enlarged thyroid. No JVD. Carotids 2+ and equal. No bruits.  LUNGS: Clear to auscultation. Normal respiratory effort.  CARDIOVASCULAR: Regular rhythm, normal S1, S2 without murmur, gallop or rub.  BACK: No CVA or spinal tenderness.  BREASTS: No masses, tenderness or nipple discharge.  ABDOMEN: Normal appearance. Active bowel sounds. Soft, nontender without mass or organomegaly. No rebound or guarding.  EXTREMITIES: Without cyanosis, clubbing or edema. Distal pulses 2+ and equal. Normal range of motion in all extremities. No joint effusion, erythema or warmth.  NEUROLOGIC: Cranial nerves II through XII without deficit. Motor strength equal bilaterally. Sensation normal to touch. Deep tendon reflexes 2+ and equal. Gait without abnormality. No tremor. Negative cerebellar signs.  PELVIC: External: Without lesions or inflammation. Vaginal: Atrophic changes. Cervix: Nontender. Uterus: Normal size and shape. Adnexa: Non-tender, without masses. Rectovaginal: Confirms, heme-negative stool x2.    ASSESSMENT:  1. CKD (chronic kidney disease), stage III    2. Dementia without behavioral disturbance, unspecified dementia type    3. Mixed hyperlipidemia    4. Bilateral hearing loss,  unspecified hearing loss type    5. Osteopenia, unspecified location      PLAN:   1.  Weight reduction.  Exercise regularly.  2.  Age-appropriate counseling.  3.  Fasting lab.  4.  Discussed urinary incontinence.  5.  Discontinue calcium and glucosamine.  6.  Take vitamin D3 5971-2203 units daily.  7.  Follow-up in 6-12 months.  8.  Discontinue Fosamax, calcium and glucosamine.  9.  Continue vitamin D3 9113-0636 units daily.

## 2018-08-14 NOTE — PATIENT INSTRUCTIONS
ONLY VITAMIN SUPPLEMENT NEEDED IS VITAMIN D3.   YOU CAN TAKE 8176-9361 UNITS DAILY.    DO NOT NEED TO TAKE GLUCOSAMINE OR CALCIUM.    STOP FOSAMAX.    WEAR DEPENDS WHEN GOING OUT FOR URINARY LEAKAGE.

## 2018-08-15 ENCOUNTER — LAB VISIT (OUTPATIENT)
Dept: LAB | Facility: HOSPITAL | Age: 78
End: 2018-08-15
Attending: FAMILY MEDICINE
Payer: MEDICARE

## 2018-08-15 DIAGNOSIS — E78.2 MIXED HYPERLIPIDEMIA: ICD-10-CM

## 2018-08-15 LAB
ALBUMIN SERPL BCP-MCNC: 3.9 G/DL
ALP SERPL-CCNC: 51 U/L
ALT SERPL W/O P-5'-P-CCNC: 24 U/L
ANION GAP SERPL CALC-SCNC: 10 MMOL/L
AST SERPL-CCNC: 23 U/L
BASOPHILS # BLD AUTO: 0.12 K/UL
BASOPHILS NFR BLD: 1.7 %
BILIRUB SERPL-MCNC: 0.4 MG/DL
BUN SERPL-MCNC: 14 MG/DL
CALCIUM SERPL-MCNC: 9.5 MG/DL
CHLORIDE SERPL-SCNC: 108 MMOL/L
CHOLEST SERPL-MCNC: 189 MG/DL
CHOLEST/HDLC SERPL: 4.1 {RATIO}
CO2 SERPL-SCNC: 23 MMOL/L
CREAT SERPL-MCNC: 1.1 MG/DL
DIFFERENTIAL METHOD: NORMAL
EOSINOPHIL # BLD AUTO: 0.4 K/UL
EOSINOPHIL NFR BLD: 6.2 %
ERYTHROCYTE [DISTWIDTH] IN BLOOD BY AUTOMATED COUNT: 14.1 %
EST. GFR  (AFRICAN AMERICAN): 55.6 ML/MIN/1.73 M^2
EST. GFR  (NON AFRICAN AMERICAN): 48.2 ML/MIN/1.73 M^2
GLUCOSE SERPL-MCNC: 98 MG/DL
HCT VFR BLD AUTO: 40.9 %
HDLC SERPL-MCNC: 46 MG/DL
HDLC SERPL: 24.3 %
HGB BLD-MCNC: 13.1 G/DL
IMM GRANULOCYTES # BLD AUTO: 0.03 K/UL
IMM GRANULOCYTES NFR BLD AUTO: 0.4 %
LDLC SERPL CALC-MCNC: 102.4 MG/DL
LYMPHOCYTES # BLD AUTO: 2 K/UL
LYMPHOCYTES NFR BLD: 29.1 %
MCH RBC QN AUTO: 29.8 PG
MCHC RBC AUTO-ENTMCNC: 32 G/DL
MCV RBC AUTO: 93 FL
MONOCYTES # BLD AUTO: 0.5 K/UL
MONOCYTES NFR BLD: 6.9 %
NEUTROPHILS # BLD AUTO: 3.9 K/UL
NEUTROPHILS NFR BLD: 55.7 %
NONHDLC SERPL-MCNC: 143 MG/DL
NRBC BLD-RTO: 0 /100 WBC
PLATELET # BLD AUTO: 281 K/UL
PMV BLD AUTO: 10.3 FL
POTASSIUM SERPL-SCNC: 4.1 MMOL/L
PROT SERPL-MCNC: 7.8 G/DL
RBC # BLD AUTO: 4.39 M/UL
SODIUM SERPL-SCNC: 141 MMOL/L
TRIGL SERPL-MCNC: 203 MG/DL
TSH SERPL DL<=0.005 MIU/L-ACNC: 3.21 UIU/ML
WBC # BLD AUTO: 6.95 K/UL

## 2018-08-15 PROCEDURE — 85025 COMPLETE CBC W/AUTO DIFF WBC: CPT

## 2018-08-15 PROCEDURE — 80061 LIPID PANEL: CPT

## 2018-08-15 PROCEDURE — 80053 COMPREHEN METABOLIC PANEL: CPT

## 2018-08-15 PROCEDURE — 84443 ASSAY THYROID STIM HORMONE: CPT

## 2018-08-15 PROCEDURE — 36415 COLL VENOUS BLD VENIPUNCTURE: CPT | Mod: PO

## 2018-08-15 RX ORDER — PRAVASTATIN SODIUM 40 MG/1
TABLET ORAL
Qty: 30 TABLET | Refills: 11 | Status: SHIPPED | OUTPATIENT
Start: 2018-08-15 | End: 2019-07-13 | Stop reason: SDUPTHER

## 2018-08-24 ENCOUNTER — PES CALL (OUTPATIENT)
Dept: ADMINISTRATIVE | Facility: CLINIC | Age: 78
End: 2018-08-24

## 2018-09-12 RX ORDER — DONEPEZIL HYDROCHLORIDE 10 MG/1
TABLET, FILM COATED ORAL
Qty: 30 TABLET | Refills: 11 | Status: SHIPPED | OUTPATIENT
Start: 2018-09-12 | End: 2019-08-10 | Stop reason: SDUPTHER

## 2018-11-21 ENCOUNTER — HOSPITAL ENCOUNTER (OUTPATIENT)
Dept: RADIOLOGY | Facility: HOSPITAL | Age: 78
Discharge: HOME | End: 2018-11-21
Attending: SURGERY
Payer: MEDICARE

## 2018-11-21 ENCOUNTER — IMMUNIZATION (OUTPATIENT)
Dept: INTERNAL MEDICINE | Facility: CLINIC | Age: 78
End: 2018-11-21
Payer: MEDICARE

## 2018-11-21 VITALS — BODY MASS INDEX: 27.6 KG/M2 | WEIGHT: 150 LBS | HEIGHT: 62 IN

## 2018-11-21 DIAGNOSIS — R92.8 ABNORMAL MAMMOGRAM: ICD-10-CM

## 2018-11-21 PROCEDURE — 90662 IIV NO PRSV INCREASED AG IM: CPT | Mod: PBBFAC,PO

## 2018-11-21 PROCEDURE — 77065 DX MAMMO INCL CAD UNI: CPT | Mod: 26,LT,, | Performed by: RADIOLOGY

## 2018-11-21 PROCEDURE — 76642 ULTRASOUND BREAST LIMITED: CPT | Mod: TC,PO,LT

## 2018-11-21 PROCEDURE — 76642 ULTRASOUND BREAST LIMITED: CPT | Mod: 26,LT,, | Performed by: RADIOLOGY

## 2018-11-21 PROCEDURE — 77065 DX MAMMO INCL CAD UNI: CPT | Mod: TC,PO,LT

## 2018-11-21 PROCEDURE — 77061 BREAST TOMOSYNTHESIS UNI: CPT | Mod: TC,PO,LT

## 2018-11-21 PROCEDURE — 77061 BREAST TOMOSYNTHESIS UNI: CPT | Mod: 26,LT,, | Performed by: RADIOLOGY

## 2018-11-26 ENCOUNTER — PATIENT MESSAGE (OUTPATIENT)
Dept: SURGERY | Facility: CLINIC | Age: 78
End: 2018-11-26

## 2019-03-15 ENCOUNTER — PES CALL (OUTPATIENT)
Dept: ADMINISTRATIVE | Facility: CLINIC | Age: 79
End: 2019-03-15

## 2019-04-24 ENCOUNTER — OFFICE VISIT (OUTPATIENT)
Dept: FAMILY MEDICINE | Facility: CLINIC | Age: 79
End: 2019-04-24
Payer: MEDICARE

## 2019-04-24 ENCOUNTER — TELEPHONE (OUTPATIENT)
Dept: FAMILY MEDICINE | Facility: CLINIC | Age: 79
End: 2019-04-24

## 2019-04-24 VITALS
HEART RATE: 70 BPM | BODY MASS INDEX: 27.38 KG/M2 | SYSTOLIC BLOOD PRESSURE: 158 MMHG | WEIGHT: 148.81 LBS | TEMPERATURE: 98 F | DIASTOLIC BLOOD PRESSURE: 86 MMHG | OXYGEN SATURATION: 97 % | HEIGHT: 62 IN

## 2019-04-24 DIAGNOSIS — E78.1 HYPERTRIGLYCERIDEMIA: ICD-10-CM

## 2019-04-24 DIAGNOSIS — Z00.00 ENCOUNTER FOR PREVENTIVE HEALTH EXAMINATION: Primary | ICD-10-CM

## 2019-04-24 DIAGNOSIS — M85.80 OSTEOPENIA, UNSPECIFIED LOCATION: ICD-10-CM

## 2019-04-24 DIAGNOSIS — R03.0 ELEVATED BLOOD PRESSURE READING: ICD-10-CM

## 2019-04-24 DIAGNOSIS — F03.90 DEMENTIA WITHOUT BEHAVIORAL DISTURBANCE, UNSPECIFIED DEMENTIA TYPE: ICD-10-CM

## 2019-04-24 DIAGNOSIS — N18.30 CKD (CHRONIC KIDNEY DISEASE), STAGE III: ICD-10-CM

## 2019-04-24 PROCEDURE — 99999 PR PBB SHADOW E&M-EST. PATIENT-LVL IV: ICD-10-PCS | Mod: PBBFAC,,, | Performed by: NURSE PRACTITIONER

## 2019-04-24 PROCEDURE — 99999 PR PBB SHADOW E&M-EST. PATIENT-LVL IV: CPT | Mod: PBBFAC,,, | Performed by: NURSE PRACTITIONER

## 2019-04-24 PROCEDURE — G0439 PR MEDICARE ANNUAL WELLNESS SUBSEQUENT VISIT: ICD-10-PCS | Mod: S$GLB,,, | Performed by: NURSE PRACTITIONER

## 2019-04-24 PROCEDURE — G0439 PPPS, SUBSEQ VISIT: HCPCS | Mod: S$GLB,,, | Performed by: NURSE PRACTITIONER

## 2019-04-24 PROCEDURE — 99214 OFFICE O/P EST MOD 30 MIN: CPT | Mod: PBBFAC,PO | Performed by: NURSE PRACTITIONER

## 2019-04-24 NOTE — TELEPHONE ENCOUNTER
----- Message from Drew Hargrove sent at 4/24/2019  3:55 PM CDT -----  Contact: bruna mullen   Trying to get pt's appt  Rescheduled and worked in on same day as his so they can come together on 4/29           [phone]

## 2019-04-24 NOTE — PROGRESS NOTES
"Vanessa Vazquez presented for a  Medicare AWV and comprehensive Health Risk Assessment today. The following components were reviewed and updated:.  .  Patient accompanied by   , who was present throughout the visit and aided in information gathering.        · Medical history  · Family History  · Social history  · Allergies and Current Medications  · Health Risk Assessment  · Health Maintenance  · Care Team     ** See Completed Assessments for Annual Wellness Visit within the encounter summary.**       The following assessments were completed:  · Living Situation  · CAGE  · Depression Screening  · Timed Get Up and Go  · Whisper Test  · Cognitive Function Screening  · Nutrition Screening  · ADL Screening  · PAQ Screening    Vitals:    04/24/19 1258   BP: (!) 150/68   BP Location: Left arm   Patient Position: Sitting   Pulse: 70   Temp: 97.5 °F (36.4 °C)   TempSrc: Tympanic   SpO2: 97%   Weight: 67.5 kg (148 lb 13 oz)   Height: 5' 2" (1.575 m)     Body mass index is 27.22 kg/m².  Physical Exam   Constitutional: She appears well-developed.   HENT:   Head: Normocephalic and atraumatic.   Eyes: Pupils are equal, round, and reactive to light.   Neck: Carotid bruit is not present.   Cardiovascular: Normal rate, regular rhythm, normal heart sounds, intact distal pulses and normal pulses. Exam reveals no gallop.   No murmur heard.  Pulmonary/Chest: Effort normal and breath sounds normal.   Abdominal: Soft. Normal appearance and bowel sounds are normal. She exhibits no distension. There is no tenderness.   Musculoskeletal: Normal range of motion. She exhibits no edema or tenderness.   Neurological: She is alert. She exhibits normal muscle tone. Gait normal.   Skin: Skin is warm, dry and intact.   Psychiatric: She has a normal mood and affect. Her speech is normal and behavior is normal. Judgment and thought content normal. She exhibits abnormal recent memory.   Nursing note and vitals reviewed.        Diagnoses and " health risks identified today and associated recommendations/orders:    1. Encounter for preventive health examination    2. Elevated blood pressure reading  This is a new problem that has been identified during today's visit. BP elevated today - states no acute pain or problems- no prior HTN. Instructed to connieer and record. Pt scheduled to see PCP 4/26/19 for annual exam- will send message    3. Dementia without behavioral disturbance, unspecified dementia type  Chronic and Stable on Arcipet. Dx approximate 2012-  states she's forgetful but is very active and no safety issues. Continue current treatment plan as previously prescribed with your PCP    4. CKD (chronic kidney disease), stage III  m^2 48.2Abnormal   48.9Abnormal  CM   Chronic and Stable renal function . Continue current treatment plan as previously prescribed with your PCP    5 Hypertriglyceridemia  Component      Latest Ref Rng & Units 8/15/2018   Cholesterol      120 - 199 mg/dL 189   Triglycerides      30 - 150 mg/dL 203 (H)   HDL      40 - 75 mg/dL 46   LDL Cholesterol      63.0 - 159.0 mg/dL 102.4   HDL/Chol Ratio      20.0 - 50.0 % 24.3   Total Cholesterol/HDL Ratio      2.0 - 5.0 4.1   Non-HDL Cholesterol      mg/dL 143   This problem is currently not controlled. Please follow up with your PCP as planned to discuss adjustments to your treatment plan.      6. Osteopenia, unspecified location   DEXA due 05/08/2020  DEXA ..5/8/2017   Chronic and Stable on calicum and vitamin D . Continue current treatment plan as previously prescribed with your PCP       I offered to discuss end of life issues, including information on how to make advance directives that the patient could use to name someone who would make medical decisions on their behalf if they became too ill to make themselves.  _X_Patient/  is interested, I provided paper work and offered to discuss.    Provided Vanessa LEES with a 5-10 year written screening schedule and personal  prevention plan. Recommendations were developed using the USPSTF age appropriate recommendations. Education, counseling, and referrals were provided as needed. After Visit Summary printed and given to patient which includes a list of additional screenings\tests needed.    1 year annual exam   Lorri Fairbanks NP

## 2019-04-24 NOTE — Clinical Note
Your patient was seen today for a HRA visit. Pt BP was elevated with no history of HTN- she' scheduled to see you 4/29/19  I have included a copy of my visit note, please review the note and feel free to contact me with any questions. Thank you for allowing me to participate in the care of your patients. Lorri Fairbanks NP

## 2019-04-24 NOTE — PATIENT INSTRUCTIONS
Counseling and Referral of Other Preventative  (Italic type indicates deductible and co-insurance are waived)    Patient Name: Vanessa Vazquez  Today's Date: 4/24/2019    Health Maintenance       Date Due Completion Date    Lipid Panel 08/15/2019 8/15/2018    DEXA SCAN 05/08/2020 5/8/2017    TETANUS VACCINE 01/03/2022 1/3/2012        No orders of the defined types were placed in this encounter.    The following information is provided to all patients.  This information is to help you find resources for any of the problems found today that may be affecting your health:                Living healthy guide: www.[facility].[place].[facility]      Understanding Diabetes: www.diabetes.org      Eating healthy: www.cdc.gov/healthyweight      CDC home safety checklist: www.cdc.gov/steadi/patient.html      Agency on Aging: www.goea.[place].[facility]      Alcoholics anonymous (AA): www.aa.org      Physical Activity: www.paul.nih.gov/el3qiqr      Tobacco use: www.quitwithusla.org

## 2019-04-29 ENCOUNTER — OFFICE VISIT (OUTPATIENT)
Dept: FAMILY MEDICINE | Facility: CLINIC | Age: 79
End: 2019-04-29
Payer: MEDICARE

## 2019-04-29 VITALS
WEIGHT: 148.81 LBS | OXYGEN SATURATION: 95 % | DIASTOLIC BLOOD PRESSURE: 70 MMHG | HEIGHT: 62 IN | HEART RATE: 77 BPM | SYSTOLIC BLOOD PRESSURE: 128 MMHG | BODY MASS INDEX: 27.38 KG/M2 | TEMPERATURE: 99 F

## 2019-04-29 DIAGNOSIS — E78.1 HYPERTRIGLYCERIDEMIA: ICD-10-CM

## 2019-04-29 DIAGNOSIS — N18.30 CKD (CHRONIC KIDNEY DISEASE), STAGE III: ICD-10-CM

## 2019-04-29 DIAGNOSIS — M85.80 OSTEOPENIA, UNSPECIFIED LOCATION: ICD-10-CM

## 2019-04-29 DIAGNOSIS — F03.90 DEMENTIA WITHOUT BEHAVIORAL DISTURBANCE, UNSPECIFIED DEMENTIA TYPE: Primary | ICD-10-CM

## 2019-04-29 DIAGNOSIS — R32 URINARY INCONTINENCE, UNSPECIFIED TYPE: ICD-10-CM

## 2019-04-29 PROCEDURE — 99214 PR OFFICE/OUTPT VISIT, EST, LEVL IV, 30-39 MIN: ICD-10-PCS | Mod: S$PBB,,, | Performed by: FAMILY MEDICINE

## 2019-04-29 PROCEDURE — 99213 OFFICE O/P EST LOW 20 MIN: CPT | Mod: PBBFAC,PO | Performed by: FAMILY MEDICINE

## 2019-04-29 PROCEDURE — 99214 OFFICE O/P EST MOD 30 MIN: CPT | Mod: S$PBB,,, | Performed by: FAMILY MEDICINE

## 2019-04-29 PROCEDURE — 99999 PR PBB SHADOW E&M-EST. PATIENT-LVL III: ICD-10-PCS | Mod: PBBFAC,,, | Performed by: FAMILY MEDICINE

## 2019-04-29 PROCEDURE — 99999 PR PBB SHADOW E&M-EST. PATIENT-LVL III: CPT | Mod: PBBFAC,,, | Performed by: FAMILY MEDICINE

## 2019-04-29 RX ORDER — AMOXICILLIN 500 MG/1
CAPSULE ORAL
COMMUNITY
Start: 2019-04-19 | End: 2020-07-06

## 2019-04-29 NOTE — PROGRESS NOTES
CHIEF COMPLAINT:  This is a 78-year-old female here for preventive health exam.     SUBJECTIVE: Patient reports that she is doing well and has no complaints. She continues to take Aricept for dementia. 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  She exercises by walking with her  around the neighborhood.  Her  reports that she has urinary incontinence but the patient does not feel that this problem occurs often enough to wear Depends.  The patient has osteopenia.  Bone DEXA scan in May 2017 showed improved bone mineral density.  She is currently on a drug holiday from Fosamax.     Eye exam November 2017. Mammogram April 2018. Bone DEXA scan May 2017, due again in May 2020.  Pap smear October 2009.  Colonoscopy January 2009, due again in January 2019. Tdap January 2012. Pneumovax October 2008.  Prevnar July 2015.  Influenza vaccine November 2018.     ROS:  GENERAL: Patient denies fever, chills, night sweats. Patient denies weight gain or loss. Patient denies anorexia, fatigue, weakness or swollen glands.  SKIN: Patient denies rash or hair loss.  HEENT: Patient denies sore throat, ear pain, hearing loss, nasal congestion, or runny nose. Patient denies visual disturbance, eye irritation or discharge.  LUNGS: Patient denies cough, wheeze or hemoptysis.  CARDIOVASCULAR: Patient denies chest pain, shortness of breath, palpitations, syncope or lower extremity edema.  GI: Patient denies abdominal pain, nausea, vomiting, diarrhea, constipation, blood in stool or melena.  GENITOURINARY: Patient denies pelvic pain, vaginal discharge, itch or odor. Patient denies irregular vaginal bleeding. Patient denies dysuria, frequency, hematuria, nocturia, urgency or incontinence.  BREASTS: Patient denies breast pain, mass or nipple discharge.  MUSCULOSKELETAL: Patient denies joint pain, swelling, redness or warmth.  NEUROLOGIC: Patient denies  headache, vertigo, paresthesias, weakness in limb, dysarthria, dysphagia or abnormality of gait.  PSYCHIATRIC: Patient denies anxiety, depression, or memory loss.     OBJECTIVE:   GENERAL: Well-developed well-nourished, overweight elderly, white female alert and oriented x3, in no acute distress.  Mild to moderate cognitive impairment.  No hallucinations or delusions.   SKIN: Clear without rash. Normal color and tone.  HEENT: Eyes: Clear conjunctivae. No scleral icterus. Pupils equal reactive to light and accommodation. Ears: Hearing aids. Clear TMs. Clear canals. Nose: Without congestion. Pharynx: Without injection or exudates.  NECK: Supple, normal range of motion. No masses, lymphadenopathy or enlarged thyroid. No JVD. Carotids 2+ and equal. No bruits.  LUNGS: Clear to auscultation. Normal respiratory effort.  CARDIOVASCULAR: Regular rhythm, normal S1, S2 without murmur, gallop or rub.  BACK: No CVA or spinal tenderness.  BREASTS: No masses, tenderness or nipple discharge.  ABDOMEN: Normal appearance. Active bowel sounds. Soft, nontender without mass or organomegaly. No rebound or guarding.  EXTREMITIES: Without cyanosis, clubbing or edema. Distal pulses 2+ and equal. Normal range of motion in all extremities. No joint effusion, erythema or warmth.  NEUROLOGIC: Cranial nerves II through XII without deficit. Motor strength equal bilaterally. Sensation normal to touch. Deep tendon reflexes 2+ and equal. Gait without abnormality. No tremor. Negative cerebellar signs.   PELVIC:  Patient declines.    ASSESSMENT:  1. Dementia without behavioral disturbance, unspecified dementia type    2. CKD (chronic kidney disease), stage III    3. Hypertriglyceridemia    4. Urinary incontinence, unspecified type    5. Osteopenia, unspecified location      PLAN:   1.  Weight reduction.  Exercise regularly.  2.  Age-appropriate counseling.  3.  Fasting lab.  4.  Refill medications as needed.  5.  Continue vitamin D3 1321-6829 units  daily.  6.  Follow-up in 6-12 months.    This note is generated with speech recognition software and is subject to transcription error and sound alike phrases that may be missed by proofreading.

## 2019-07-13 RX ORDER — PRAVASTATIN SODIUM 40 MG/1
TABLET ORAL
Qty: 30 TABLET | Refills: 9 | Status: SHIPPED | OUTPATIENT
Start: 2019-07-13 | End: 2020-06-24

## 2019-08-10 RX ORDER — DONEPEZIL HYDROCHLORIDE 10 MG/1
10 TABLET, FILM COATED ORAL DAILY
Qty: 30 TABLET | Refills: 9 | Status: SHIPPED | OUTPATIENT
Start: 2019-08-10 | End: 2020-07-26

## 2019-11-13 ENCOUNTER — TELEPHONE (OUTPATIENT)
Dept: FAMILY MEDICINE | Facility: CLINIC | Age: 79
End: 2019-11-13

## 2019-11-13 DIAGNOSIS — Z12.39 BREAST CANCER SCREENING: Primary | ICD-10-CM

## 2019-11-13 DIAGNOSIS — Z12.31 ENCOUNTER FOR SCREENING MAMMOGRAM FOR MALIGNANT NEOPLASM OF BREAST: ICD-10-CM

## 2019-11-13 NOTE — TELEPHONE ENCOUNTER
----- Message from Isaura Fairbanks sent at 11/13/2019  4:09 PM CST -----  Contact:   The pt request a call to schedule a mammo appt, no orders in the system and the pt can be reached at [phone]///[id number]

## 2019-11-13 NOTE — TELEPHONE ENCOUNTER
----- Message from Viky Jimenez sent at 11/13/2019  4:40 PM CST -----  Caller returning call.[phone] (home)

## 2019-11-14 ENCOUNTER — TELEPHONE (OUTPATIENT)
Dept: FAMILY MEDICINE | Facility: CLINIC | Age: 79
End: 2019-11-14

## 2019-11-14 NOTE — TELEPHONE ENCOUNTER
----- Message from Connie Joseph sent at 11/14/2019  9:19 AM CST -----  Contact: [phone]  Would like to consult with the nurse, patient would like to get a order for a mammogram, please call back at  [phone], thanks sj  .Type:  Mammogram    Caller is requesting to schedule their annual mammogram appointment.  Order is not listed in EPIC.  Please enter order and contact patient to schedule.  Name of Caller: Ms Vazquez  Where would they like the mammogram performed?[facility]  Would the patient rather a call back or a response via [facility]? CallBack  Best Call Back Number:[phone]  Additional Information:

## 2019-12-11 ENCOUNTER — HOSPITAL ENCOUNTER (OUTPATIENT)
Dept: RADIOLOGY | Facility: HOSPITAL | Age: 79
Discharge: HOME | End: 2019-12-11
Attending: FAMILY MEDICINE
Payer: MEDICARE

## 2019-12-11 VITALS — WEIGHT: 148.81 LBS | BODY MASS INDEX: 27.38 KG/M2 | HEIGHT: 62 IN

## 2019-12-11 DIAGNOSIS — Z12.31 ENCOUNTER FOR SCREENING MAMMOGRAM FOR MALIGNANT NEOPLASM OF BREAST: ICD-10-CM

## 2019-12-11 DIAGNOSIS — Z12.39 BREAST CANCER SCREENING: ICD-10-CM

## 2019-12-11 PROCEDURE — 77063 MAMMO DIGITAL SCREENING BILAT WITH TOMOSYNTHESIS_CAD: ICD-10-PCS | Mod: 26,,, | Performed by: RADIOLOGY

## 2019-12-11 PROCEDURE — 77067 MAMMO DIGITAL SCREENING BILAT WITH TOMOSYNTHESIS_CAD: ICD-10-PCS | Mod: 26,,, | Performed by: RADIOLOGY

## 2019-12-11 PROCEDURE — 77067 SCR MAMMO BI INCL CAD: CPT | Mod: 26,,, | Performed by: RADIOLOGY

## 2019-12-11 PROCEDURE — 77063 BREAST TOMOSYNTHESIS BI: CPT | Mod: 26,,, | Performed by: RADIOLOGY

## 2019-12-11 PROCEDURE — 77067 SCR MAMMO BI INCL CAD: CPT | Mod: TC

## 2020-07-06 ENCOUNTER — OFFICE VISIT (OUTPATIENT)
Dept: HOME HEALTH SERVICES | Facility: CLINIC | Age: 80
End: 2020-07-06
Payer: MEDICARE

## 2020-07-06 VITALS
TEMPERATURE: 97 F | HEART RATE: 70 BPM | HEIGHT: 62 IN | OXYGEN SATURATION: 97 % | WEIGHT: 138 LBS | SYSTOLIC BLOOD PRESSURE: 128 MMHG | BODY MASS INDEX: 25.4 KG/M2 | DIASTOLIC BLOOD PRESSURE: 79 MMHG

## 2020-07-06 DIAGNOSIS — R32 URINARY INCONTINENCE, UNSPECIFIED TYPE: ICD-10-CM

## 2020-07-06 DIAGNOSIS — H91.93 BILATERAL HEARING LOSS, UNSPECIFIED HEARING LOSS TYPE: ICD-10-CM

## 2020-07-06 DIAGNOSIS — N18.30 CKD (CHRONIC KIDNEY DISEASE), STAGE III: ICD-10-CM

## 2020-07-06 DIAGNOSIS — Z00.00 ENCOUNTER FOR PREVENTIVE HEALTH EXAMINATION: Primary | ICD-10-CM

## 2020-07-06 DIAGNOSIS — F03.90 DEMENTIA WITHOUT BEHAVIORAL DISTURBANCE, UNSPECIFIED DEMENTIA TYPE: ICD-10-CM

## 2020-07-06 DIAGNOSIS — Z78.0 POSTMENOPAUSAL: ICD-10-CM

## 2020-07-06 DIAGNOSIS — E78.1 HYPERTRIGLYCERIDEMIA: ICD-10-CM

## 2020-07-06 DIAGNOSIS — M18.10 OSTEOARTHRITIS OF THUMB, UNSPECIFIED LATERALITY: ICD-10-CM

## 2020-07-06 DIAGNOSIS — M85.80 OSTEOPENIA, UNSPECIFIED LOCATION: ICD-10-CM

## 2020-07-06 PROCEDURE — G0439 PR MEDICARE ANNUAL WELLNESS SUBSEQUENT VISIT: ICD-10-PCS | Mod: S$GLB,,, | Performed by: NURSE PRACTITIONER

## 2020-07-06 PROCEDURE — G0439 PPPS, SUBSEQ VISIT: HCPCS | Mod: S$GLB,,, | Performed by: NURSE PRACTITIONER

## 2020-07-06 RX ORDER — CHOLECALCIFEROL (VITAMIN D3) 25 MCG
1000 TABLET ORAL DAILY
COMMUNITY

## 2020-07-06 NOTE — Clinical Note
Medicare AWV Completed. Health maintenance: ordered CBC, CMP, Lipid panel, Vitamin D level. Also ordered DEXA scan. Patient to call to scheduling to schedule these. Patient to schedule an appointment with PCP for annual physical and review labwork.

## 2020-07-06 NOTE — PATIENT INSTRUCTIONS
Counseling and Referral of Other Preventative  (Italic type indicates deductible and co-insurance are waived)    Patient Name: Vanessa Vazquez  Today's Date: 7/6/2020    Health Maintenance       Date Due Completion Date    Shingles Vaccine (1 of 2) 07/03/1990 ---    Lipid Panel 08/15/2019 8/15/2018    DEXA SCAN 05/08/2020 5/8/2017    Influenza Vaccine (1) 09/01/2020 11/21/2018    TETANUS VACCINE 01/03/2022 1/3/2012        Orders Placed This Encounter   Procedures    DXA Bone Density Spine And Hip    CBC auto differential    Comprehensive metabolic panel    Vitamin D    Lipid Panel     The following information is provided to all patients.  This information is to help you find resources for any of the problems found today that may be affecting your health:                Living healthy guide: www.[facility].[place].gov      Understanding Diabetes: www.diabetes.org      Eating healthy: www.cdc.gov/healthyweight      [facility] home safety checklist: www.cdc.gov/steadi/patient.html      Agency on Aging: www.goea.[place].[facility]      Alcoholics anonymous (AA): www.aa.org      Physical Activity: www.paul.nih.gov/vd7smnf      Tobacco use: www.quitwithusla.org

## 2020-07-06 NOTE — PROGRESS NOTES
"Vanessa Vazquez presented for Medicare AWV today. The following components were reviewed and updated:    · Medical history  · Family History  · Social history  · Allergies and Current Medications  · Health Risk Assessment  · Health Maintenance  · Care Team    **See Completed Assessments for Annual Wellness visit with in the encounter summary    The following assessments were completed:  · Depression Screening  · Cognitive function Screening    · Timed Get Up Test  · Whisper Test    Vitals:    07/06/20 1052   BP: 128/79   Pulse: 70   Temp: 97.2 °F (36.2 °C)   TempSrc: Temporal   SpO2: 97%   Weight: 62.6 kg (138 lb)   Height: 5' 2" (1.575 m)     Body mass index is 25.24 kg/m².   ]    Physical Exam  Vitals signs reviewed.   Constitutional:       Appearance: Normal appearance.   HENT:      Head: Normocephalic and atraumatic.   Neck:      Musculoskeletal: Normal range of motion and neck supple.   Cardiovascular:      Rate and Rhythm: Normal rate and regular rhythm.      Pulses: Normal pulses.      Heart sounds: Normal heart sounds.   Pulmonary:      Effort: Pulmonary effort is normal.      Breath sounds: Normal breath sounds.   Musculoskeletal: Normal range of motion.   Skin:     General: Skin is warm and dry.   Neurological:      Mental Status: She is alert and oriented to person, place, and time.      Comments: Cognitive function test score 2.    Psychiatric:         Mood and Affect: Mood normal.         Behavior: Behavior normal.         Thought Content: Thought content normal.          Outpatient Medications Marked as Taking for the 7/6/20 encounter (Office Visit) with ORTEGA Isaac   Medication Sig Dispense Refill    aspirin (ECOTRIN) 81 MG EC tablet Take 81 mg by mouth once daily.      donepezil (ARICEPT) 10 MG tablet Take 1 tablet (10 mg total) by mouth once daily. 30 tablet 9    pravastatin (PRAVACHOL) 40 MG tablet TAKE ONE TABLET BY MOUTH ONE TIME DAILY  30 tablet 0    vitamin D (VITAMIN D3) 1000 " units Tab Take 1,000 Units by mouth once daily.          Diagnoses and health risks identified today and associated recommendations/orders:  1. Encounter for preventive health examination  Completed. Health maintenance: ordered CBC, CMP, Lipid panel, Vitamin D level. Also ordered DEXA scan. Patient to call to scheduling to schedule these. Patient to schedule an appointment with PCP for annual physical and review labwork.     2. CKD (chronic kidney disease), stage III  Chronic and stable. No acute issues. Instructed patient to avoid NSAIDS. Continue current management. Ordered CMP. Follow up with PCP.  - CBC auto differential; Future  - Comprehensive metabolic panel; Future    3. Hypertriglyceridemia  Chronic and stable. No acute issues. Continue current management on Pravastatin. Follow up with PCP.  - Lipid Panel; Future    4. Dementia without behavioral disturbance, unspecified dementia type  Chronic and stable. No acute issues. Cognitive function score 2. No safety issues identified. Continue current management on Aricept. Follow up with PCP.    5. Osteopenia, unspecified location  Chronic and stable. No acute issues. Continue current management on Vitamin D and weight bearing exercise. DEXA and vitamin D level ordered. Follow up with PCP.  - Vitamin D; Future  - DXA Bone Density Spine And Hip; Future    6. Bilateral hearing loss, unspecified hearing loss type  Chronic and stable. No acute issues. Has hearing aids but does not wear them. Follow up with PCP.    7. Osteoarthritis of thumb, unspecified laterality  Chronic and stable. No complaints of pain. Continue current management. Follow up with PCP.    8. Urinary incontinence, unspecified type  Chronic and stable. No acute issues. Continue current management. Follow up with PCP.          Provided Vanessa LEES with a 5-10 year written screening schedule and personal prevention plan. Recommendations were developed using the USPSTF age appropriate recommendations.  Education, counseling, and referrals were provided as needed.  After Visit Summary printed and given to patient which includes a list of additional screenings\tests needed.    Follow up in about 1 year (around 7/6/2021) for annual wellness visit.      ORTEGA Isaac  I offered to discuss end of life issues, including information on how to make advance directives that the patient could use to name someone who would make medical decisions on their behalf if they became too ill to make themselves.    ___Patient declined  _X_Patient has living will and POA paperwork done and will bring it next visit.

## 2020-07-08 ENCOUNTER — LAB VISIT (OUTPATIENT)
Dept: LAB | Facility: HOSPITAL | Age: 80
End: 2020-07-08
Attending: NURSE PRACTITIONER
Payer: MEDICARE

## 2020-07-08 DIAGNOSIS — M85.80 OSTEOPENIA, UNSPECIFIED LOCATION: ICD-10-CM

## 2020-07-08 DIAGNOSIS — E78.1 HYPERTRIGLYCERIDEMIA: ICD-10-CM

## 2020-07-08 DIAGNOSIS — N18.30 CKD (CHRONIC KIDNEY DISEASE), STAGE III: ICD-10-CM

## 2020-07-08 LAB
ALBUMIN SERPL BCP-MCNC: 4.1 G/DL (ref 3.5–5.2)
ALP SERPL-CCNC: 66 U/L (ref 55–135)
ALT SERPL W/O P-5'-P-CCNC: 23 U/L (ref 10–44)
ANION GAP SERPL CALC-SCNC: 10 MMOL/L (ref 8–16)
AST SERPL-CCNC: 23 U/L (ref 10–40)
BASOPHILS # BLD AUTO: 0.1 K/UL (ref 0–0.2)
BASOPHILS NFR BLD: 1.5 % (ref 0–1.9)
BILIRUB SERPL-MCNC: 0.3 MG/DL (ref 0.1–1)
BUN SERPL-MCNC: 17 MG/DL (ref 8–23)
CALCIUM SERPL-MCNC: 9.9 MG/DL (ref 8.7–10.5)
CHLORIDE SERPL-SCNC: 106 MMOL/L (ref 95–110)
CHOLEST SERPL-MCNC: 222 MG/DL (ref 120–199)
CHOLEST/HDLC SERPL: 4.8 {RATIO} (ref 2–5)
CO2 SERPL-SCNC: 24 MMOL/L (ref 23–29)
CREAT SERPL-MCNC: 1 MG/DL (ref 0.5–1.4)
DIFFERENTIAL METHOD: ABNORMAL
EOSINOPHIL # BLD AUTO: 0.3 K/UL (ref 0–0.5)
EOSINOPHIL NFR BLD: 4.5 % (ref 0–8)
ERYTHROCYTE [DISTWIDTH] IN BLOOD BY AUTOMATED COUNT: 14.1 % (ref 11.5–14.5)
EST. GFR  (AFRICAN AMERICAN): >60 ML/MIN/1.73 M^2
EST. GFR  (NON AFRICAN AMERICAN): 53.3 ML/MIN/1.73 M^2
GLUCOSE SERPL-MCNC: 84 MG/DL (ref 70–110)
HCT VFR BLD AUTO: 43.9 % (ref 37–48.5)
HDLC SERPL-MCNC: 46 MG/DL (ref 40–75)
HDLC SERPL: 20.7 % (ref 20–50)
HGB BLD-MCNC: 13.5 G/DL (ref 12–16)
IMM GRANULOCYTES # BLD AUTO: 0.02 K/UL (ref 0–0.04)
IMM GRANULOCYTES NFR BLD AUTO: 0.3 % (ref 0–0.5)
LDLC SERPL CALC-MCNC: 126.8 MG/DL (ref 63–159)
LYMPHOCYTES # BLD AUTO: 1.7 K/UL (ref 1–4.8)
LYMPHOCYTES NFR BLD: 26.3 % (ref 18–48)
MCH RBC QN AUTO: 29 PG (ref 27–31)
MCHC RBC AUTO-ENTMCNC: 30.8 G/DL (ref 32–36)
MCV RBC AUTO: 94 FL (ref 82–98)
MONOCYTES # BLD AUTO: 0.4 K/UL (ref 0.3–1)
MONOCYTES NFR BLD: 6.3 % (ref 4–15)
NEUTROPHILS # BLD AUTO: 4 K/UL (ref 1.8–7.7)
NEUTROPHILS NFR BLD: 61.1 % (ref 38–73)
NONHDLC SERPL-MCNC: 176 MG/DL
NRBC BLD-RTO: 0 /100 WBC
PLATELET # BLD AUTO: 293 K/UL (ref 150–350)
PMV BLD AUTO: 10.2 FL (ref 9.2–12.9)
POTASSIUM SERPL-SCNC: 4 MMOL/L (ref 3.5–5.1)
PROT SERPL-MCNC: 8.1 G/DL (ref 6–8.4)
RBC # BLD AUTO: 4.66 M/UL (ref 4–5.4)
SODIUM SERPL-SCNC: 140 MMOL/L (ref 136–145)
TRIGL SERPL-MCNC: 246 MG/DL (ref 30–150)
WBC # BLD AUTO: 6.62 K/UL (ref 3.9–12.7)

## 2020-07-08 PROCEDURE — 82306 VITAMIN D 25 HYDROXY: CPT

## 2020-07-08 PROCEDURE — 85025 COMPLETE CBC W/AUTO DIFF WBC: CPT

## 2020-07-08 PROCEDURE — 36415 COLL VENOUS BLD VENIPUNCTURE: CPT | Mod: PO

## 2020-07-08 PROCEDURE — 80053 COMPREHEN METABOLIC PANEL: CPT

## 2020-07-08 PROCEDURE — 80061 LIPID PANEL: CPT

## 2020-07-09 LAB — 25(OH)D3+25(OH)D2 SERPL-MCNC: 58 NG/ML (ref 30–96)

## 2020-07-26 RX ORDER — DONEPEZIL HYDROCHLORIDE 10 MG/1
10 TABLET, FILM COATED ORAL DAILY
Qty: 30 TABLET | Refills: 0 | Status: SHIPPED | OUTPATIENT
Start: 2020-07-26 | End: 2020-08-24

## 2020-07-26 RX ORDER — PRAVASTATIN SODIUM 40 MG/1
TABLET ORAL
Qty: 30 TABLET | Refills: 0 | Status: SHIPPED | OUTPATIENT
Start: 2020-07-26 | End: 2020-08-24

## 2020-08-06 ENCOUNTER — OFFICE VISIT (OUTPATIENT)
Dept: FAMILY MEDICINE | Facility: CLINIC | Age: 80
End: 2020-08-06
Payer: MEDICARE

## 2020-08-06 VITALS
HEART RATE: 70 BPM | SYSTOLIC BLOOD PRESSURE: 139 MMHG | HEIGHT: 62 IN | TEMPERATURE: 99 F | DIASTOLIC BLOOD PRESSURE: 72 MMHG | OXYGEN SATURATION: 97 % | WEIGHT: 145.31 LBS | BODY MASS INDEX: 26.74 KG/M2

## 2020-08-06 DIAGNOSIS — M85.859 OSTEOPENIA OF HIP, UNSPECIFIED LATERALITY: ICD-10-CM

## 2020-08-06 DIAGNOSIS — F03.90 DEMENTIA WITHOUT BEHAVIORAL DISTURBANCE, UNSPECIFIED DEMENTIA TYPE: Primary | Chronic | ICD-10-CM

## 2020-08-06 DIAGNOSIS — Z01.419 WELL FEMALE EXAM WITH ROUTINE GYNECOLOGICAL EXAM: ICD-10-CM

## 2020-08-06 DIAGNOSIS — E78.1 HYPERTRIGLYCERIDEMIA: Chronic | ICD-10-CM

## 2020-08-06 DIAGNOSIS — N18.30 CKD (CHRONIC KIDNEY DISEASE), STAGE III: Chronic | ICD-10-CM

## 2020-08-06 PROCEDURE — 99213 OFFICE O/P EST LOW 20 MIN: CPT | Mod: PBBFAC,PO | Performed by: FAMILY MEDICINE

## 2020-08-06 PROCEDURE — 99999 PR PBB SHADOW E&M-EST. PATIENT-LVL III: ICD-10-PCS | Mod: PBBFAC,,, | Performed by: FAMILY MEDICINE

## 2020-08-06 PROCEDURE — 99999 PR PBB SHADOW E&M-EST. PATIENT-LVL III: CPT | Mod: PBBFAC,,, | Performed by: FAMILY MEDICINE

## 2020-08-06 PROCEDURE — G0101 CA SCREEN;PELVIC/BREAST EXAM: HCPCS | Mod: S$PBB,,, | Performed by: FAMILY MEDICINE

## 2020-08-06 PROCEDURE — 99214 PR OFFICE/OUTPT VISIT, EST, LEVL IV, 30-39 MIN: ICD-10-PCS | Mod: 25,S$PBB,, | Performed by: FAMILY MEDICINE

## 2020-08-06 PROCEDURE — G0101 CA SCREEN;PELVIC/BREAST EXAM: HCPCS | Mod: PBBFAC,PO | Performed by: FAMILY MEDICINE

## 2020-08-06 PROCEDURE — G0101 PR CA SCREEN;PELVIC/BREAST EXAM: ICD-10-PCS | Mod: S$PBB,,, | Performed by: FAMILY MEDICINE

## 2020-08-06 PROCEDURE — 99214 OFFICE O/P EST MOD 30 MIN: CPT | Mod: 25,S$PBB,, | Performed by: FAMILY MEDICINE

## 2020-08-06 NOTE — PROGRESS NOTES
CHIEF COMPLAINT:  This is a 80-year-old female here for preventive health exam.     SUBJECTIVE: Patient reports that she is doing well and has no complaints. She continues to take Aricept for dementia.  She has chronic kidney disease stage 3 and hypertriglyceridemia.  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  She exercises by walking with her  around the neighborhood.  Her  reports that she has urinary incontinence but the patient does not feel that this problem occurs often enough to wear adult diapers.  The patient has osteopenia.  Bone DEXA scan in July 2020 showed improved bone mineral density in spine and stable bone mineral density in hip.  She has been on a drug holiday from Fosamax since 2017.     Eye exam November 2017. Mammogram December 2019. Bone DEXA scan July 2020, due again in July 2022.  Pap smear October 2009.  Colonoscopy January 2009, due again in January 2019. Tdap January 2012. Pneumovax October 2008.  Prevnar July 2015.  Influenza vaccine November 2018.      ROS:  GENERAL: Patient denies fever, chills, night sweats. Patient denies weight gain or loss. Patient denies anorexia, fatigue, weakness or swollen glands.  SKIN: Patient denies rash or hair loss.  HEENT: Patient denies sore throat, ear pain, hearing loss, nasal congestion, or runny nose. Patient denies visual disturbance, eye irritation or discharge.  LUNGS: Patient denies cough, wheeze or hemoptysis.  CARDIOVASCULAR: Patient denies chest pain, shortness of breath, palpitations, syncope or lower extremity edema.  GI: Patient denies abdominal pain, nausea, vomiting, diarrhea, constipation, blood in stool or melena.  GENITOURINARY: Patient denies pelvic pain, vaginal discharge, itch or odor. Patient denies irregular vaginal bleeding. Patient denies dysuria, frequency, hematuria, nocturia, urgency or  incontinence.  BREASTS: Patient denies breast pain, mass or nipple discharge.  MUSCULOSKELETAL: Patient denies joint pain, swelling, redness or warmth.  NEUROLOGIC: Patient denies headache, vertigo, paresthesias, weakness in limb, dysarthria, dysphagia or abnormality of gait.  PSYCHIATRIC: Patient denies anxiety, depression, or memory loss.     OBJECTIVE:   GENERAL: Well-developed well-nourished, overweight elderly, white female alert and oriented x3, in no acute distress.  Mild to moderate cognitive impairment.  No hallucinations or delusions.   SKIN: Clear without rash. Normal color and tone.  HEENT: Eyes: Clear conjunctivae. No scleral icterus. Pupils equal reactive to light and accommodation. Ears: Hearing aids. Clear TMs. Clear canals. Nose: Without congestion. Pharynx: Without injection or exudates.  NECK: Supple, normal range of motion. No masses, lymphadenopathy or enlarged thyroid. No JVD. Carotids 2+ and equal. No bruits.  LUNGS: Clear to auscultation. Normal respiratory effort.  CARDIOVASCULAR: Regular rhythm, normal S1, S2 without murmur, gallop or rub.  BACK: No CVA or spinal tenderness.  BREASTS: No masses, tenderness or nipple discharge.  ABDOMEN: Normal appearance. Active bowel sounds. Soft, nontender without mass or organomegaly. No rebound or guarding.  EXTREMITIES: Without cyanosis, clubbing or edema. Distal pulses 2+ and equal. Normal range of motion in all extremities. No joint effusion, erythema or warmth.  NEUROLOGIC: Cranial nerves II through XII without deficit. Motor strength equal bilaterally. Sensation normal to touch. Deep tendon reflexes 2+ and equal. Gait without abnormality. No tremor. Negative cerebellar signs.   PELVIC:  External:  Without lesions or inflammation. Vagina: Atrophic changes.  Cervix:  No motion tenderness.  Uterus:  Normal size and shape.  Bimanual:  Nontender.  No palpable masses.    ASSESSMENT:  1. Dementia without behavioral disturbance, unspecified dementia type     2. CKD (chronic kidney disease), stage III    3. Hypertriglyceridemia    4. Osteopenia of hip, unspecified laterality    5. Well female exam with routine gynecological exam        PLAN:  1.  Weight reduction. Exercise regularly.  2.  Age-appropriate counseling.  3.  Results reviewed and acceptable.  4.  Hold Fosamax.  5.  Follow up in 6 months.    This note is generated with speech recognition software and is subject to transcription error and sound alike phrases that may be missed by proofreading.

## 2020-08-24 RX ORDER — PRAVASTATIN SODIUM 40 MG/1
TABLET ORAL
Qty: 30 TABLET | Refills: 11 | Status: SHIPPED | OUTPATIENT
Start: 2020-08-24 | End: 2021-03-09 | Stop reason: SDUPTHER

## 2020-08-24 RX ORDER — DONEPEZIL HYDROCHLORIDE 10 MG/1
TABLET, FILM COATED ORAL
Qty: 30 TABLET | Refills: 11 | Status: SHIPPED | OUTPATIENT
Start: 2020-08-24 | End: 2021-07-20

## 2020-12-30 ENCOUNTER — PATIENT MESSAGE (OUTPATIENT)
Dept: FAMILY MEDICINE | Facility: CLINIC | Age: 80
End: 2020-12-30

## 2021-01-02 ENCOUNTER — PATIENT MESSAGE (OUTPATIENT)
Dept: FAMILY MEDICINE | Facility: CLINIC | Age: 81
End: 2021-01-02

## 2021-01-15 ENCOUNTER — IMMUNIZATION (OUTPATIENT)
Dept: INTERNAL MEDICINE | Facility: CLINIC | Age: 81
End: 2021-01-15
Payer: MEDICARE

## 2021-01-15 DIAGNOSIS — Z23 NEED FOR VACCINATION: Primary | ICD-10-CM

## 2021-01-15 PROCEDURE — 91300 COVID-19, MRNA, LNP-S, PF, 30 MCG/0.3 ML DOSE VACCINE: CPT | Mod: PBBFAC | Performed by: FAMILY MEDICINE

## 2021-02-05 ENCOUNTER — IMMUNIZATION (OUTPATIENT)
Dept: INTERNAL MEDICINE | Facility: CLINIC | Age: 81
End: 2021-02-05
Payer: MEDICARE

## 2021-02-05 DIAGNOSIS — Z23 NEED FOR VACCINATION: Primary | ICD-10-CM

## 2021-02-05 PROCEDURE — 91300 COVID-19, MRNA, LNP-S, PF, 30 MCG/0.3 ML DOSE VACCINE: CPT | Mod: PBBFAC | Performed by: FAMILY MEDICINE

## 2021-02-05 PROCEDURE — 0002A COVID-19, MRNA, LNP-S, PF, 30 MCG/0.3 ML DOSE VACCINE: CPT | Mod: PBBFAC | Performed by: FAMILY MEDICINE

## 2021-03-09 RX ORDER — PRAVASTATIN SODIUM 40 MG/1
40 TABLET ORAL DAILY
Qty: 90 TABLET | Refills: 1 | Status: SHIPPED | OUTPATIENT
Start: 2021-03-09 | End: 2021-09-18

## 2021-05-27 ENCOUNTER — TELEPHONE (OUTPATIENT)
Dept: ADMINISTRATIVE | Facility: HOSPITAL | Age: 81
End: 2021-05-27

## 2021-07-08 ENCOUNTER — OFFICE VISIT (OUTPATIENT)
Dept: FAMILY MEDICINE | Facility: CLINIC | Age: 81
End: 2021-07-08
Payer: MEDICARE

## 2021-07-08 VITALS
HEART RATE: 77 BPM | SYSTOLIC BLOOD PRESSURE: 122 MMHG | TEMPERATURE: 98 F | WEIGHT: 147.06 LBS | RESPIRATION RATE: 18 BRPM | BODY MASS INDEX: 27.06 KG/M2 | HEIGHT: 62 IN | DIASTOLIC BLOOD PRESSURE: 80 MMHG

## 2021-07-08 DIAGNOSIS — N18.32 STAGE 3B CHRONIC KIDNEY DISEASE: Chronic | ICD-10-CM

## 2021-07-08 DIAGNOSIS — R32 URINARY INCONTINENCE, UNSPECIFIED TYPE: ICD-10-CM

## 2021-07-08 DIAGNOSIS — E78.1 HYPERTRIGLYCERIDEMIA: Chronic | ICD-10-CM

## 2021-07-08 DIAGNOSIS — Z00.00 ENCOUNTER FOR PREVENTIVE HEALTH EXAMINATION: Primary | ICD-10-CM

## 2021-07-08 DIAGNOSIS — F03.90 DEMENTIA WITHOUT BEHAVIORAL DISTURBANCE, UNSPECIFIED DEMENTIA TYPE: Chronic | ICD-10-CM

## 2021-07-08 DIAGNOSIS — M85.859 OSTEOPENIA OF HIP, UNSPECIFIED LATERALITY: ICD-10-CM

## 2021-07-08 PROCEDURE — 99214 OFFICE O/P EST MOD 30 MIN: CPT | Mod: PBBFAC,PO | Performed by: NURSE PRACTITIONER

## 2021-07-08 PROCEDURE — G0439 PPPS, SUBSEQ VISIT: HCPCS | Mod: ,,, | Performed by: NURSE PRACTITIONER

## 2021-07-08 PROCEDURE — 99999 PR PBB SHADOW E&M-EST. PATIENT-LVL IV: ICD-10-PCS | Mod: PBBFAC,,, | Performed by: NURSE PRACTITIONER

## 2021-07-08 PROCEDURE — G0439 PR MEDICARE ANNUAL WELLNESS SUBSEQUENT VISIT: ICD-10-PCS | Mod: ,,, | Performed by: NURSE PRACTITIONER

## 2021-07-08 PROCEDURE — 99999 PR PBB SHADOW E&M-EST. PATIENT-LVL IV: CPT | Mod: PBBFAC,,, | Performed by: NURSE PRACTITIONER

## 2021-07-20 RX ORDER — DONEPEZIL HYDROCHLORIDE 10 MG/1
TABLET, FILM COATED ORAL
Qty: 30 TABLET | Refills: 0 | Status: SHIPPED | OUTPATIENT
Start: 2021-07-20 | End: 2021-10-01 | Stop reason: SDUPTHER

## 2021-08-24 ENCOUNTER — OFFICE VISIT (OUTPATIENT)
Dept: FAMILY MEDICINE | Facility: CLINIC | Age: 81
End: 2021-08-24
Payer: MEDICARE

## 2021-08-24 VITALS
HEIGHT: 62 IN | HEART RATE: 71 BPM | TEMPERATURE: 98 F | SYSTOLIC BLOOD PRESSURE: 130 MMHG | DIASTOLIC BLOOD PRESSURE: 80 MMHG | BODY MASS INDEX: 26.67 KG/M2 | WEIGHT: 144.94 LBS | OXYGEN SATURATION: 96 %

## 2021-08-24 DIAGNOSIS — Z12.31 ENCOUNTER FOR SCREENING MAMMOGRAM FOR MALIGNANT NEOPLASM OF BREAST: ICD-10-CM

## 2021-08-24 DIAGNOSIS — E78.1 HYPERTRIGLYCERIDEMIA: Chronic | ICD-10-CM

## 2021-08-24 DIAGNOSIS — Z01.00 COMPLETE EYE EXAM, ENCOUNTER FOR: ICD-10-CM

## 2021-08-24 DIAGNOSIS — N18.32 STAGE 3B CHRONIC KIDNEY DISEASE: Chronic | ICD-10-CM

## 2021-08-24 DIAGNOSIS — F03.90 DEMENTIA WITHOUT BEHAVIORAL DISTURBANCE, UNSPECIFIED DEMENTIA TYPE: Primary | Chronic | ICD-10-CM

## 2021-08-24 PROCEDURE — 99214 PR OFFICE/OUTPT VISIT, EST, LEVL IV, 30-39 MIN: ICD-10-PCS | Mod: S$PBB,,, | Performed by: FAMILY MEDICINE

## 2021-08-24 PROCEDURE — 99999 PR PBB SHADOW E&M-EST. PATIENT-LVL IV: ICD-10-PCS | Mod: PBBFAC,,, | Performed by: FAMILY MEDICINE

## 2021-08-24 PROCEDURE — 99214 OFFICE O/P EST MOD 30 MIN: CPT | Mod: S$PBB,,, | Performed by: FAMILY MEDICINE

## 2021-08-24 PROCEDURE — 99999 PR PBB SHADOW E&M-EST. PATIENT-LVL IV: CPT | Mod: PBBFAC,,, | Performed by: FAMILY MEDICINE

## 2021-08-24 PROCEDURE — 99214 OFFICE O/P EST MOD 30 MIN: CPT | Mod: PBBFAC,PO | Performed by: FAMILY MEDICINE

## 2021-09-03 ENCOUNTER — HOSPITAL ENCOUNTER (OUTPATIENT)
Dept: RADIOLOGY | Facility: HOSPITAL | Age: 81
Discharge: HOME | End: 2021-09-03
Attending: FAMILY MEDICINE
Payer: MEDICARE

## 2021-09-03 ENCOUNTER — OFFICE VISIT (OUTPATIENT)
Dept: OPHTHALMOLOGY | Facility: CLINIC | Age: 81
End: 2021-09-03
Payer: MEDICARE

## 2021-09-03 VITALS — BODY MASS INDEX: 26.69 KG/M2 | HEIGHT: 62 IN | WEIGHT: 145.06 LBS

## 2021-09-03 DIAGNOSIS — H52.03 HYPEROPIA WITH ASTIGMATISM AND PRESBYOPIA, BILATERAL: ICD-10-CM

## 2021-09-03 DIAGNOSIS — H52.4 HYPEROPIA WITH ASTIGMATISM AND PRESBYOPIA, BILATERAL: ICD-10-CM

## 2021-09-03 DIAGNOSIS — Z12.31 ENCOUNTER FOR SCREENING MAMMOGRAM FOR MALIGNANT NEOPLASM OF BREAST: ICD-10-CM

## 2021-09-03 DIAGNOSIS — Z01.00 COMPLETE EYE EXAM, ENCOUNTER FOR: ICD-10-CM

## 2021-09-03 DIAGNOSIS — H25.013 CORTICAL AGE-RELATED CATARACT OF BOTH EYES: ICD-10-CM

## 2021-09-03 DIAGNOSIS — H25.13 NUCLEAR SCLEROSIS, BILATERAL: Primary | ICD-10-CM

## 2021-09-03 DIAGNOSIS — H52.203 HYPEROPIA WITH ASTIGMATISM AND PRESBYOPIA, BILATERAL: ICD-10-CM

## 2021-09-03 PROCEDURE — 77063 BREAST TOMOSYNTHESIS BI: CPT | Mod: 26,,, | Performed by: RADIOLOGY

## 2021-09-03 PROCEDURE — 77067 MAMMO DIGITAL SCREENING BILAT WITH TOMO: ICD-10-PCS | Mod: 26,,, | Performed by: RADIOLOGY

## 2021-09-03 PROCEDURE — 77067 SCR MAMMO BI INCL CAD: CPT | Mod: 26,,, | Performed by: RADIOLOGY

## 2021-09-03 PROCEDURE — 99999 PR PBB SHADOW E&M-EST. PATIENT-LVL II: CPT | Mod: PBBFAC,,, | Performed by: OPTOMETRIST

## 2021-09-03 PROCEDURE — 92015 DETERMINE REFRACTIVE STATE: CPT | Mod: ,,, | Performed by: OPTOMETRIST

## 2021-09-03 PROCEDURE — 99212 OFFICE O/P EST SF 10 MIN: CPT | Mod: PBBFAC | Performed by: OPTOMETRIST

## 2021-09-03 PROCEDURE — 92015 PR REFRACTION: ICD-10-PCS | Mod: ,,, | Performed by: OPTOMETRIST

## 2021-09-03 PROCEDURE — 92004 PR EYE EXAM, NEW PATIENT,COMPREHESV: ICD-10-PCS | Mod: S$PBB,,, | Performed by: OPTOMETRIST

## 2021-09-03 PROCEDURE — 77063 MAMMO DIGITAL SCREENING BILAT WITH TOMO: ICD-10-PCS | Mod: 26,,, | Performed by: RADIOLOGY

## 2021-09-03 PROCEDURE — 92004 COMPRE OPH EXAM NEW PT 1/>: CPT | Mod: S$PBB,,, | Performed by: OPTOMETRIST

## 2021-09-03 PROCEDURE — 77067 SCR MAMMO BI INCL CAD: CPT | Mod: TC

## 2021-09-03 PROCEDURE — 99999 PR PBB SHADOW E&M-EST. PATIENT-LVL II: ICD-10-PCS | Mod: PBBFAC,,, | Performed by: OPTOMETRIST

## 2021-09-18 RX ORDER — PRAVASTATIN SODIUM 40 MG/1
TABLET ORAL
Qty: 90 TABLET | Refills: 3 | Status: SHIPPED | OUTPATIENT
Start: 2021-09-18 | End: 2022-11-01

## 2021-09-23 ENCOUNTER — PATIENT OUTREACH (OUTPATIENT)
Dept: ADMINISTRATIVE | Facility: OTHER | Age: 81
End: 2021-09-23

## 2021-09-23 ENCOUNTER — TELEPHONE (OUTPATIENT)
Dept: OPHTHALMOLOGY | Facility: CLINIC | Age: 81
End: 2021-09-23

## 2021-10-01 RX ORDER — DONEPEZIL HYDROCHLORIDE 10 MG/1
10 TABLET, FILM COATED ORAL DAILY
Qty: 30 TABLET | Refills: 11 | Status: SHIPPED | OUTPATIENT
Start: 2021-10-01 | End: 2022-09-29

## 2021-10-18 ENCOUNTER — OFFICE VISIT (OUTPATIENT)
Dept: OPHTHALMOLOGY | Facility: CLINIC | Age: 81
End: 2021-10-18
Payer: MEDICARE

## 2021-10-18 DIAGNOSIS — H25.013 CORTICAL AGE-RELATED CATARACT OF BOTH EYES: ICD-10-CM

## 2021-10-18 DIAGNOSIS — H18.513 CORNEAL GUTTATA OF BOTH EYES: ICD-10-CM

## 2021-10-18 DIAGNOSIS — H25.13 NUCLEAR SCLEROSIS, BILATERAL: Primary | ICD-10-CM

## 2021-10-18 PROCEDURE — 92004 PR EYE EXAM, NEW PATIENT,COMPREHESV: ICD-10-PCS | Mod: S$PBB,,, | Performed by: OPHTHALMOLOGY

## 2021-10-18 PROCEDURE — 99999 PR PBB SHADOW E&M-EST. PATIENT-LVL II: ICD-10-PCS | Mod: PBBFAC,,, | Performed by: OPHTHALMOLOGY

## 2021-10-18 PROCEDURE — 99212 OFFICE O/P EST SF 10 MIN: CPT | Mod: PBBFAC | Performed by: OPHTHALMOLOGY

## 2021-10-18 PROCEDURE — 92004 COMPRE OPH EXAM NEW PT 1/>: CPT | Mod: S$PBB,,, | Performed by: OPHTHALMOLOGY

## 2021-10-18 PROCEDURE — 99999 PR PBB SHADOW E&M-EST. PATIENT-LVL II: CPT | Mod: PBBFAC,,, | Performed by: OPHTHALMOLOGY

## 2021-10-18 RX ORDER — KETOROLAC TROMETHAMINE 5 MG/ML
1 SOLUTION OPHTHALMIC 4 TIMES DAILY
Qty: 1 BOTTLE | Refills: 2 | Status: SHIPPED | OUTPATIENT
Start: 2021-10-18 | End: 2022-08-12

## 2021-10-18 RX ORDER — PREDNISOLONE ACETATE 10 MG/ML
1 SUSPENSION/ DROPS OPHTHALMIC 4 TIMES DAILY
Qty: 1 BOTTLE | Refills: 2 | Status: SHIPPED | OUTPATIENT
Start: 2021-10-18 | End: 2022-08-12

## 2021-10-18 RX ORDER — MOXIFLOXACIN 5 MG/ML
1 SOLUTION/ DROPS OPHTHALMIC EVERY 12 HOURS
Qty: 5 ML | Refills: 2 | Status: SHIPPED | OUTPATIENT
Start: 2021-10-18 | End: 2023-01-05 | Stop reason: ALTCHOICE

## 2021-11-17 ENCOUNTER — OUTSIDE PLACE OF SERVICE (OUTPATIENT)
Dept: OPHTHALMOLOGY | Facility: CLINIC | Age: 81
End: 2021-11-17
Payer: MEDICARE

## 2021-11-17 PROCEDURE — 66982 XCAPSL CTRC RMVL CPLX WO ECP: CPT | Mod: RT,,, | Performed by: OPHTHALMOLOGY

## 2021-11-17 PROCEDURE — 66982 PR REMOVAL, CATARACT, W/INSRT INTRAOC LENS, W/O ENDO CYCLO, CMPLX: ICD-10-PCS | Mod: RT,,, | Performed by: OPHTHALMOLOGY

## 2021-11-18 ENCOUNTER — OFFICE VISIT (OUTPATIENT)
Dept: OPHTHALMOLOGY | Facility: CLINIC | Age: 81
End: 2021-11-18
Payer: MEDICARE

## 2021-11-18 DIAGNOSIS — Z98.890 POST-OPERATIVE STATE: Primary | ICD-10-CM

## 2021-11-18 PROCEDURE — 99999 PR PBB SHADOW E&M-EST. PATIENT-LVL I: ICD-10-PCS | Mod: PBBFAC,,, | Performed by: OPHTHALMOLOGY

## 2021-11-18 PROCEDURE — 99211 OFF/OP EST MAY X REQ PHY/QHP: CPT | Mod: PBBFAC | Performed by: OPHTHALMOLOGY

## 2021-11-18 PROCEDURE — 99024 POSTOP FOLLOW-UP VISIT: CPT | Mod: POP,,, | Performed by: OPHTHALMOLOGY

## 2021-11-18 PROCEDURE — 99024 PR POST-OP FOLLOW-UP VISIT: ICD-10-PCS | Mod: POP,,, | Performed by: OPHTHALMOLOGY

## 2021-11-18 PROCEDURE — 99999 PR PBB SHADOW E&M-EST. PATIENT-LVL I: CPT | Mod: PBBFAC,,, | Performed by: OPHTHALMOLOGY

## 2021-11-23 ENCOUNTER — OFFICE VISIT (OUTPATIENT)
Dept: OPHTHALMOLOGY | Facility: CLINIC | Age: 81
End: 2021-11-23
Payer: MEDICARE

## 2021-11-23 DIAGNOSIS — Z98.890 POST-OPERATIVE STATE: Primary | ICD-10-CM

## 2021-11-23 DIAGNOSIS — H25.12 NUCLEAR SCLEROSIS OF LEFT EYE: ICD-10-CM

## 2021-11-23 DIAGNOSIS — H26.9 CORTICAL CATARACT OF LEFT EYE: ICD-10-CM

## 2021-11-23 PROCEDURE — 99212 OFFICE O/P EST SF 10 MIN: CPT | Mod: PBBFAC | Performed by: OPHTHALMOLOGY

## 2021-11-23 PROCEDURE — 99999 PR PBB SHADOW E&M-EST. PATIENT-LVL II: ICD-10-PCS | Mod: PBBFAC,,, | Performed by: OPHTHALMOLOGY

## 2021-11-23 PROCEDURE — 99999 PR PBB SHADOW E&M-EST. PATIENT-LVL II: CPT | Mod: PBBFAC,,, | Performed by: OPHTHALMOLOGY

## 2021-11-23 PROCEDURE — 99024 POSTOP FOLLOW-UP VISIT: CPT | Mod: POP,,, | Performed by: OPHTHALMOLOGY

## 2021-11-23 PROCEDURE — 99024 PR POST-OP FOLLOW-UP VISIT: ICD-10-PCS | Mod: POP,,, | Performed by: OPHTHALMOLOGY

## 2021-11-23 RX ORDER — PREDNISOLONE ACETATE 10 MG/ML
1 SUSPENSION/ DROPS OPHTHALMIC 4 TIMES DAILY
Qty: 5 ML | Refills: 1 | Status: SHIPPED | OUTPATIENT
Start: 2021-11-23 | End: 2023-01-05 | Stop reason: ALTCHOICE

## 2021-11-23 RX ORDER — KETOROLAC TROMETHAMINE 5 MG/ML
1 SOLUTION OPHTHALMIC 4 TIMES DAILY
Qty: 5 ML | Refills: 1 | Status: SHIPPED | OUTPATIENT
Start: 2021-11-23 | End: 2023-01-05 | Stop reason: ALTCHOICE

## 2021-11-23 RX ORDER — GATIFLOXACIN 5 MG/ML
1 SOLUTION/ DROPS OPHTHALMIC 2 TIMES DAILY
Qty: 5 ML | Refills: 2 | Status: SHIPPED | OUTPATIENT
Start: 2021-11-23 | End: 2023-01-05 | Stop reason: ALTCHOICE

## 2022-01-14 ENCOUNTER — TELEPHONE (OUTPATIENT)
Dept: OPHTHALMOLOGY | Facility: CLINIC | Age: 82
End: 2022-01-14
Payer: MEDICARE

## 2022-01-20 ENCOUNTER — OFFICE VISIT (OUTPATIENT)
Dept: OPHTHALMOLOGY | Facility: CLINIC | Age: 82
End: 2022-01-20
Payer: MEDICARE

## 2022-01-20 DIAGNOSIS — Z98.890 POST-OPERATIVE STATE: Primary | ICD-10-CM

## 2022-01-20 PROCEDURE — 99999 PR PBB SHADOW E&M-EST. PATIENT-LVL II: CPT | Mod: PBBFAC,,, | Performed by: OPHTHALMOLOGY

## 2022-01-20 PROCEDURE — 99212 OFFICE O/P EST SF 10 MIN: CPT | Mod: PBBFAC | Performed by: OPHTHALMOLOGY

## 2022-01-20 PROCEDURE — 99024 POSTOP FOLLOW-UP VISIT: CPT | Mod: POP,,, | Performed by: OPHTHALMOLOGY

## 2022-01-20 PROCEDURE — 99024 PR POST-OP FOLLOW-UP VISIT: ICD-10-PCS | Mod: POP,,, | Performed by: OPHTHALMOLOGY

## 2022-01-20 PROCEDURE — 99999 PR PBB SHADOW E&M-EST. PATIENT-LVL II: ICD-10-PCS | Mod: PBBFAC,,, | Performed by: OPHTHALMOLOGY

## 2022-01-20 NOTE — PROGRESS NOTES
SUBJECTIVE  Vanessa Vazquez is 81 y.o. female  Uncorrected distance visual acuity was not recorded in the right eye and 20/25 -1 in the left eye.   Chief Complaint   Patient presents with    Post-op Evaluation          HPI     1. PC IOL OD 11/17/2021  PCIOL OS 1/19/22  2. Fx Glaucoma-brother    OS:  Vigamox BID  Pred QID  KetoQID      Last edited by ABY Gonzalez on 1/20/2022  8:12 AM. (History)         Assessment /Plan :  1. Post-operative state Exam:  SLE:  S/C: normal  K : trace k fold  AC: 1+ cell and flare  Iris: normal  Lens: PCIOL    IMP:  PO Day 1 S/P Phaco/IOL OS : Doing well.    Plan:  Continue gtts to operative eye:  Pred 1% QID  Ketorolac QID  Zymaxid/moxifloxacin BID  Reinstructed in importance of absolute compliance with Post-OP instructions including medications, shield at bedtime, and limitation of activities. Follow up appointments in approximately one and six weeks or call immediately for increased pain, redness or vision loss.

## 2022-01-31 ENCOUNTER — OFFICE VISIT (OUTPATIENT)
Dept: OPHTHALMOLOGY | Facility: CLINIC | Age: 82
End: 2022-01-31
Payer: MEDICARE

## 2022-01-31 DIAGNOSIS — Z98.890 POST-OPERATIVE STATE: Primary | ICD-10-CM

## 2022-01-31 PROCEDURE — 99999 PR PBB SHADOW E&M-EST. PATIENT-LVL II: CPT | Mod: PBBFAC,,, | Performed by: OPHTHALMOLOGY

## 2022-01-31 PROCEDURE — 99999 PR PBB SHADOW E&M-EST. PATIENT-LVL II: ICD-10-PCS | Mod: PBBFAC,,, | Performed by: OPHTHALMOLOGY

## 2022-01-31 PROCEDURE — 99024 POSTOP FOLLOW-UP VISIT: CPT | Mod: POP,,, | Performed by: OPHTHALMOLOGY

## 2022-01-31 PROCEDURE — 99212 OFFICE O/P EST SF 10 MIN: CPT | Mod: PBBFAC | Performed by: OPHTHALMOLOGY

## 2022-01-31 PROCEDURE — 99024 PR POST-OP FOLLOW-UP VISIT: ICD-10-PCS | Mod: POP,,, | Performed by: OPHTHALMOLOGY

## 2022-01-31 NOTE — PROGRESS NOTES
SUBJECTIVE  Vanessa Vazquez is 81 y.o. female  Uncorrected distance visual acuity was not recorded in the right eye and 20/25 +1 in the left eye.   Chief Complaint   Patient presents with    Post-op Evaluation          HPI     Pt in today for a 1 week post-op of the OS. Pt states her vision has   improved since her last visit. Pt denies any ocular pain or discomfort. Pt   states she's compliant with her drops.    DNL pt    1. PC IOL OD 11/17/2021  PCIOL OS 1/19/22 (+27.5 SN60WF)  2. Fx Glaucoma-brother    OS:  Vigamox BID  Pred QID  KetoQID      Last edited by Sarika Ugarte on 1/31/2022  9:46 AM. (History)         Assessment /Plan :  1. Post-operative state Slit lamp exam:  L/L: nl  K: clear, wound sealed  AC: trace cell and flare  Iris/Lens: IOL centered and stable      IMP:  PO Week 1 S/P Phaco/ IOL OS : Doing well with no evidence of infection or abnormal inflammation.     Plan:  Pt given and instructed in one week PO instructions. D/C zymaxid/moxifloxacin and start to taper off Ketorlac and Pred Forte 1% weekly. Can resume normal activitites and d/c eye shield. OTC reading glasses can be used until evaluated for final MR. Follow up in one month with Dr. Dick or PRN pain, redness, vision loss, or other concerns.

## 2022-02-25 ENCOUNTER — OFFICE VISIT (OUTPATIENT)
Dept: OPHTHALMOLOGY | Facility: CLINIC | Age: 82
End: 2022-02-25
Payer: MEDICARE

## 2022-02-25 DIAGNOSIS — Z98.890 POST-OPERATIVE STATE: Primary | ICD-10-CM

## 2022-02-25 DIAGNOSIS — H52.13 MYOPIA WITH PRESBYOPIA, BILATERAL: ICD-10-CM

## 2022-02-25 DIAGNOSIS — H52.4 MYOPIA WITH PRESBYOPIA, BILATERAL: ICD-10-CM

## 2022-02-25 DIAGNOSIS — Z96.1 PSEUDOPHAKIA OF BOTH EYES: ICD-10-CM

## 2022-02-25 PROCEDURE — 99024 POSTOP FOLLOW-UP VISIT: CPT | Mod: POP,,, | Performed by: OPTOMETRIST

## 2022-02-25 PROCEDURE — 99024 PR POST-OP FOLLOW-UP VISIT: ICD-10-PCS | Mod: POP,,, | Performed by: OPTOMETRIST

## 2022-02-25 PROCEDURE — 99999 PR PBB SHADOW E&M-EST. PATIENT-LVL I: ICD-10-PCS | Mod: PBBFAC,,, | Performed by: OPTOMETRIST

## 2022-02-25 PROCEDURE — 99999 PR PBB SHADOW E&M-EST. PATIENT-LVL I: CPT | Mod: PBBFAC,,, | Performed by: OPTOMETRIST

## 2022-02-25 PROCEDURE — 99211 OFF/OP EST MAY X REQ PHY/QHP: CPT | Mod: PBBFAC | Performed by: OPTOMETRIST

## 2022-02-25 NOTE — PROGRESS NOTES
HPI     Post-op Evaluation     Comments: 1 month PC IOL OS 01/19/22              Comments     States that her vision is stable and that she has been compliant with her   gtts as directed. Finished gtts already.   1. PC IOL OD 11/17/2021  PCIOL OS 1/19/22 (+27.5 SN60WF)  2. Fx Glaucoma-brother          Last edited by Sarika Akbar on 2/25/2022 11:04 AM. (History)            Assessment /Plan     For exam results, see Encounter Report.    Post-operative state    Pseudophakia of both eyes  Doing well OU   monitor 6 months    Myopia with presbyopia, bilateral  Spec Rx is optional, ok to continue with OTC readers      RTC 6 months for dilated eye exam or PRN if any problems.   Discussed above and answered questions.

## 2022-03-01 ENCOUNTER — TELEPHONE (OUTPATIENT)
Dept: OPHTHALMOLOGY | Facility: CLINIC | Age: 82
End: 2022-03-01
Payer: MEDICARE

## 2022-03-01 NOTE — TELEPHONE ENCOUNTER
----- Message from Priyanka Canada sent at 3/1/2022  2:49 PM CST -----  Contact: Mr sebas   would like a call back at  [phone] in regards to the procedure the patient has scheduled. He would like to know if they can still come or they have to reschedule.      Thanks

## 2022-04-05 ENCOUNTER — PROCEDURE VISIT (OUTPATIENT)
Dept: OPHTHALMOLOGY | Facility: CLINIC | Age: 82
End: 2022-04-05
Payer: MEDICARE

## 2022-04-05 DIAGNOSIS — L91.8 SKIN TAG: Primary | ICD-10-CM

## 2022-04-05 PROCEDURE — 11441 EXC FACE-MM B9+MARG 0.6-1 CM: CPT | Mod: PBBFAC | Performed by: OPHTHALMOLOGY

## 2022-04-05 PROCEDURE — 99499 NO LOS: ICD-10-PCS | Mod: S$PBB,,, | Performed by: OPHTHALMOLOGY

## 2022-04-05 PROCEDURE — 99499 UNLISTED E&M SERVICE: CPT | Mod: S$PBB,,, | Performed by: OPHTHALMOLOGY

## 2022-04-05 PROCEDURE — 11200 PR REMOVAL OF SKIN TAGS, UP TO 15: ICD-10-PCS | Mod: S$PBB,,, | Performed by: OPHTHALMOLOGY

## 2022-04-05 PROCEDURE — 11200 RMVL SKIN TAGS UP TO&INC 15: CPT | Mod: S$PBB,,, | Performed by: OPHTHALMOLOGY

## 2022-04-05 PROCEDURE — 11200 RMVL SKIN TAGS UP TO&INC 15: CPT | Mod: PBBFAC | Performed by: OPHTHALMOLOGY

## 2022-04-05 NOTE — PROGRESS NOTES
SUBJECTIVE  Vanessa Vazquez is 81 y.o. female  Uncorrected distance visual acuity was 20/30 -2 in the right eye and 20/30 -2 in the left eye.   Chief Complaint   Patient presents with    Skin Tags          HPI     Patient is here for skin tag removal right upper eyelid    1. PC IOL OD 11/17/2021  PCIOL OS 1/19/22 (+27.5 SN60WF)  2. Fx Glaucoma-brother    No eyedrops    Last edited by Cheyenne Conteh MA on 4/5/2022  3:17 PM. (History)         Assessment /Plan :  1. Skin tag  S: The patient complains of symptomatic skin tags on the Right upper eyelid. These are irritated by clothing, jewelry and rubbing.    O: Patient appears well. Several benign skin tags are noted on the RUL.     A: Skin tags     P: Skin tags are snipped off using Betadine for cleansing and sterile iris scissors. Local anesthesia was RUL   not used. These pathognomonic lesions are not sent for pathology.

## 2022-06-20 ENCOUNTER — TELEPHONE (OUTPATIENT)
Dept: ADMINISTRATIVE | Facility: HOSPITAL | Age: 82
End: 2022-06-20
Payer: MEDICARE

## 2022-07-18 ENCOUNTER — OFFICE VISIT (OUTPATIENT)
Dept: FAMILY MEDICINE | Facility: CLINIC | Age: 82
End: 2022-07-18
Payer: MEDICARE

## 2022-07-18 VITALS
RESPIRATION RATE: 18 BRPM | HEART RATE: 70 BPM | HEIGHT: 62 IN | OXYGEN SATURATION: 99 % | TEMPERATURE: 99 F | BODY MASS INDEX: 26.65 KG/M2 | SYSTOLIC BLOOD PRESSURE: 136 MMHG | DIASTOLIC BLOOD PRESSURE: 70 MMHG | WEIGHT: 144.81 LBS

## 2022-07-18 DIAGNOSIS — H91.93 BILATERAL HEARING LOSS, UNSPECIFIED HEARING LOSS TYPE: ICD-10-CM

## 2022-07-18 DIAGNOSIS — Z00.00 ENCOUNTER FOR PREVENTIVE HEALTH EXAMINATION: Primary | ICD-10-CM

## 2022-07-18 DIAGNOSIS — M85.859 OSTEOPENIA OF HIP, UNSPECIFIED LATERALITY: ICD-10-CM

## 2022-07-18 DIAGNOSIS — F03.90 DEMENTIA WITHOUT BEHAVIORAL DISTURBANCE, UNSPECIFIED DEMENTIA TYPE: Chronic | ICD-10-CM

## 2022-07-18 DIAGNOSIS — E78.1 HYPERTRIGLYCERIDEMIA: Chronic | ICD-10-CM

## 2022-07-18 DIAGNOSIS — N18.32 STAGE 3B CHRONIC KIDNEY DISEASE: Chronic | ICD-10-CM

## 2022-07-18 DIAGNOSIS — R32 URINARY INCONTINENCE, UNSPECIFIED TYPE: ICD-10-CM

## 2022-07-18 PROCEDURE — G0439 PR MEDICARE ANNUAL WELLNESS SUBSEQUENT VISIT: ICD-10-PCS | Mod: ,,, | Performed by: NURSE PRACTITIONER

## 2022-07-18 PROCEDURE — 99999 PR PBB SHADOW E&M-EST. PATIENT-LVL III: ICD-10-PCS | Mod: PBBFAC,,, | Performed by: NURSE PRACTITIONER

## 2022-07-18 PROCEDURE — 99999 PR PBB SHADOW E&M-EST. PATIENT-LVL III: CPT | Mod: PBBFAC,,, | Performed by: NURSE PRACTITIONER

## 2022-07-18 PROCEDURE — G0439 PPPS, SUBSEQ VISIT: HCPCS | Mod: ,,, | Performed by: NURSE PRACTITIONER

## 2022-07-18 PROCEDURE — 99213 OFFICE O/P EST LOW 20 MIN: CPT | Mod: PBBFAC,PO | Performed by: NURSE PRACTITIONER

## 2022-07-18 NOTE — Clinical Note
Your patient was seen today for a HRA visit.  I have included a copy of my visit note, please review the note and feel free to contact me with any questions.  Thank you for allowing me to participate in the care of your patients.  Lorri Fairbanks NP

## 2022-07-18 NOTE — PATIENT INSTRUCTIONS
Counseling and Referral of Other Preventative  (Italic type indicates deductible and co-insurance are waived)    Patient Name: Vanessa Vazquez  Today's Date: 7/18/2022    Health Maintenance       Date Due Completion Date    Shingles Vaccine (1 of 2) Never done ---    TETANUS VACCINE 01/03/2022 1/3/2012    COVID-19 Vaccine (4 - Booster for Pfizer series) 04/13/2022 12/13/2021    Influenza Vaccine (1) 09/01/2022 10/19/2021    Lipid Panel 09/03/2022 9/3/2021    DEXA Scan 07/08/2023 7/8/2020        No orders of the defined types were placed in this encounter.    The following information is provided to all patients.  This information is to help you find resources for any of the problems found today that may be affecting your health:                [employer] healthy guide: www.[facility].[place].[facility]      Understanding Diabetes: www.diabetes.org      Eating healthy: www.cdc.gov/healthyweight      [facility] home safety checklist: www.cdc.gov/steadi/patient.html      Agency on Aging: www.goea.[place].[facility]      Alcoholics anonymous (AA): www.aa.org      Physical Activity: www.paul.nih.gov/jo3wfvs      Tobacco use: www.quitwithusla.org     Counseling and Referral of Other Preventative  (Italic type indicates deductible and co-insurance are waived)    Patient Name: Vanessa Vazquez  Today's Date: 7/18/2022    Health Maintenance       Date Due Completion Date    Shingles Vaccine (1 of 2) Never done ---    TETANUS VACCINE 01/03/2022 1/3/2012    COVID-19 Vaccine (4 - Booster for Pfizer series) 04/13/2022 12/13/2021    Influenza Vaccine (1) 09/01/2022 10/19/2021    Lipid Panel 09/03/2022 9/3/2021    DEXA Scan 07/08/2023 7/8/2020        No orders of the defined types were placed in this encounter.    The following information is provided to all patients.  This information is to help you find resources for any of the problems found today that may be affecting your health:                Living Meritful guide: www.[facility].[place].[facility]      Understanding  Diabetes: www.diabetes.org      Eating healthy: www.cdc.gov/healthyweight      CDC home safety checklist: www.cdc.gov/steadi/patient.html      Agency on Aging: www.goea.[place].[facility]      Alcoholics anonymous (AA): www.aa.org      Physical Activity: www.paul.nih.gov/yz5afgr      Tobacco use: www.quitwithusla.org

## 2022-08-12 ENCOUNTER — OFFICE VISIT (OUTPATIENT)
Dept: FAMILY MEDICINE | Facility: CLINIC | Age: 82
End: 2022-08-12
Payer: MEDICARE

## 2022-08-12 ENCOUNTER — LAB VISIT (OUTPATIENT)
Dept: LAB | Facility: HOSPITAL | Age: 82
End: 2022-08-12
Attending: FAMILY MEDICINE
Payer: MEDICARE

## 2022-08-12 VITALS
HEART RATE: 89 BPM | WEIGHT: 143.44 LBS | TEMPERATURE: 96 F | HEIGHT: 62 IN | SYSTOLIC BLOOD PRESSURE: 132 MMHG | BODY MASS INDEX: 26.39 KG/M2 | DIASTOLIC BLOOD PRESSURE: 70 MMHG | OXYGEN SATURATION: 97 %

## 2022-08-12 DIAGNOSIS — Z78.0 POSTMENOPAUSAL: ICD-10-CM

## 2022-08-12 DIAGNOSIS — F03.90 DEMENTIA WITHOUT BEHAVIORAL DISTURBANCE, UNSPECIFIED DEMENTIA TYPE: Primary | Chronic | ICD-10-CM

## 2022-08-12 DIAGNOSIS — Z12.31 ENCOUNTER FOR SCREENING MAMMOGRAM FOR MALIGNANT NEOPLASM OF BREAST: ICD-10-CM

## 2022-08-12 DIAGNOSIS — E78.1 HYPERTRIGLYCERIDEMIA: Chronic | ICD-10-CM

## 2022-08-12 DIAGNOSIS — Z01.419 WELL FEMALE EXAM WITH ROUTINE GYNECOLOGICAL EXAM: ICD-10-CM

## 2022-08-12 DIAGNOSIS — N18.32 STAGE 3B CHRONIC KIDNEY DISEASE: Chronic | ICD-10-CM

## 2022-08-12 PROCEDURE — G0101 CA SCREEN;PELVIC/BREAST EXAM: HCPCS | Mod: PBBFAC,PO | Performed by: FAMILY MEDICINE

## 2022-08-12 PROCEDURE — 85025 COMPLETE CBC W/AUTO DIFF WBC: CPT | Performed by: FAMILY MEDICINE

## 2022-08-12 PROCEDURE — 80061 LIPID PANEL: CPT | Performed by: FAMILY MEDICINE

## 2022-08-12 PROCEDURE — 99999 PR PBB SHADOW E&M-EST. PATIENT-LVL IV: ICD-10-PCS | Mod: PBBFAC,,, | Performed by: FAMILY MEDICINE

## 2022-08-12 PROCEDURE — G0101 PR CA SCREEN;PELVIC/BREAST EXAM: ICD-10-PCS | Mod: S$PBB,,, | Performed by: FAMILY MEDICINE

## 2022-08-12 PROCEDURE — 99214 OFFICE O/P EST MOD 30 MIN: CPT | Mod: 25,S$PBB,, | Performed by: FAMILY MEDICINE

## 2022-08-12 PROCEDURE — 99214 PR OFFICE/OUTPT VISIT, EST, LEVL IV, 30-39 MIN: ICD-10-PCS | Mod: 25,S$PBB,, | Performed by: FAMILY MEDICINE

## 2022-08-12 PROCEDURE — 84443 ASSAY THYROID STIM HORMONE: CPT | Performed by: FAMILY MEDICINE

## 2022-08-12 PROCEDURE — 99999 PR PBB SHADOW E&M-EST. PATIENT-LVL IV: CPT | Mod: PBBFAC,,, | Performed by: FAMILY MEDICINE

## 2022-08-12 PROCEDURE — 99214 OFFICE O/P EST MOD 30 MIN: CPT | Mod: PBBFAC,PO,25 | Performed by: FAMILY MEDICINE

## 2022-08-12 PROCEDURE — 36415 COLL VENOUS BLD VENIPUNCTURE: CPT | Mod: PO | Performed by: FAMILY MEDICINE

## 2022-08-12 PROCEDURE — G0101 CA SCREEN;PELVIC/BREAST EXAM: HCPCS | Mod: S$PBB,,, | Performed by: FAMILY MEDICINE

## 2022-08-12 PROCEDURE — 80053 COMPREHEN METABOLIC PANEL: CPT | Performed by: FAMILY MEDICINE

## 2022-08-12 NOTE — PROGRESS NOTES
CHIEF COMPLAINT:  This is a 82-year-old female here for preventive health exam.     SUBJECTIVE: Patient reports that she is doing well and has no complaints. She continues to take Aricept for dementia.  She has chronic kidney disease stage 3 and hypertriglyceridemia.  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  She exercises by walking with her  around the neighborhood.  Her  reports that she has urinary incontinence but the patient does not feel that this problem occurs often enough to wear adult diapers.  The patient has osteopenia.  Bone DEXA scan in July 2020 showed improved bone mineral density in spine and stable bone mineral density in hip.  She has been on a drug holiday from Fosamax since 2017.     Eye exam February 2022. Mammogram September 2021. Bone DEXA scan July 2020, due again in July 2022.  Pap smear October 2009.  Colonoscopy January 2009. Tdap January 2012. Pneumovax October 2008.  Prevnar July 2015.  Influenza vaccine October 2021. COVID 19 vaccine January, February, December 2021.     ROS:  GENERAL: Patient denies fever, chills, night sweats. Patient denies weight gain or loss. Patient denies anorexia, fatigue, weakness or swollen glands.  SKIN: Patient denies rash or hair loss.  HEENT: Patient denies sore throat, ear pain, hearing loss, nasal congestion, or runny nose. Patient denies visual disturbance, eye irritation or discharge.  LUNGS: Patient denies cough, wheeze or hemoptysis.  CARDIOVASCULAR: Patient denies chest pain, shortness of breath, palpitations, syncope or lower extremity edema.  GI: Patient denies abdominal pain, nausea, vomiting, diarrhea, constipation, blood in stool or melena.  GENITOURINARY: Patient denies pelvic pain, vaginal discharge, itch or odor. Patient denies irregular vaginal bleeding. Patient denies dysuria, frequency, hematuria, nocturia, urgency or  incontinence.  BREASTS: Patient denies breast pain, mass or nipple discharge.  MUSCULOSKELETAL: Patient denies joint pain, swelling, redness or warmth.  NEUROLOGIC: Patient denies headache, vertigo, paresthesias, weakness in limb, dysarthria, dysphagia or abnormality of gait.  PSYCHIATRIC: Patient denies anxiety, depression, or memory loss.     OBJECTIVE:   GENERAL: Well-developed well-nourished, overweight elderly, white female alert and oriented x3, in no acute distress.  Mild to moderate cognitive impairment.  Repeats herself frequently.  No hallucinations or delusions.   SKIN: Clear without rash. Normal color and tone.  Dry skin.  Multiple SKs.  HEENT: Eyes: Clear conjunctivae. No scleral icterus. Pupils equal reactive to light and accommodation. Ears: Hearing aids. Clear TMs. Clear canals. Nose: Without congestion. Pharynx: Without injection or exudates.  NECK: Supple, normal range of motion. No masses, lymphadenopathy or enlarged thyroid. No JVD. Carotids 2+ and equal. No bruits.  LUNGS: Clear to auscultation. Normal respiratory effort.  CARDIOVASCULAR: Regular rhythm, normal S1, S2 without murmur, gallop or rub.  BACK: No CVA or spinal tenderness.  BREASTS: No masses, tenderness or nipple discharge.  ABDOMEN: Normal appearance. Active bowel sounds. Soft, nontender without mass or organomegaly. No rebound or guarding.  EXTREMITIES: Without cyanosis, clubbing or edema. Distal pulses 2+ and equal. Normal range of motion in all extremities. No joint effusion, erythema or warmth.  NEUROLOGIC: Cranial nerves II through XII without deficit. Motor strength equal bilaterally. Sensation normal to touch. Deep tendon reflexes 2+ and equal. Gait without abnormality. No tremor. Negative cerebellar signs.   PELVIC:  External:  Without lesions or inflammation.  Urethral meatus normal size and shape.  Vagina: Atrophic changes.  No discharge noted.   Cervix:  Normal appearance.   No motion tenderness.  Uterus:  Normal size and  shape.  Bimanual:  Nontender.  No palpable masses. Rectovaginal:  Confirms.  Normal anal sphincter tone.  Heme-negative stool x2.    ASSESSMENT:  1. Dementia without behavioral disturbance, unspecified dementia type    2. Hypertriglyceridemia    3. Stage 3b chronic kidney disease    4. Well female exam with routine gynecological exam    5. Postmenopausal    6. Encounter for screening mammogram for malignant neoplasm of breast      PLAN:  1. Weight reduction. Exercise regularly.  2. Age-appropriate counseling.  3. Fasting lab.  4. Mammogram.  5. Bone DEXA scan.  6. Refill medications as needed.    7. Follow-up annually.    30 minutes of total time spent on the encounter, which includes face to face time and non-face to face time preparing to see the patient (eg, review of tests), Obtaining and/or reviewing separately obtained history, Documenting clinical information in the electronic or other health record, Independently interpreting results (not separately reported) and communicating results to the patient/family/caregiver, or Care coordination (not separately reported).     This note is generated with speech recognition software and is subject to transcription error and sound alike phrases that may be missed by proofreading.

## 2022-08-13 LAB
ALBUMIN SERPL BCP-MCNC: 4.3 G/DL (ref 3.5–5.2)
ALP SERPL-CCNC: 59 U/L (ref 55–135)
ALT SERPL W/O P-5'-P-CCNC: 18 U/L (ref 10–44)
ANION GAP SERPL CALC-SCNC: 8 MMOL/L (ref 8–16)
AST SERPL-CCNC: 20 U/L (ref 10–40)
BASOPHILS # BLD AUTO: 0.11 K/UL (ref 0–0.2)
BASOPHILS NFR BLD: 1.3 % (ref 0–1.9)
BILIRUB SERPL-MCNC: 0.4 MG/DL (ref 0.1–1)
BUN SERPL-MCNC: 13 MG/DL (ref 8–23)
CALCIUM SERPL-MCNC: 9.6 MG/DL (ref 8.7–10.5)
CHLORIDE SERPL-SCNC: 106 MMOL/L (ref 95–110)
CHOLEST SERPL-MCNC: 198 MG/DL (ref 120–199)
CHOLEST/HDLC SERPL: 4.2 {RATIO} (ref 2–5)
CO2 SERPL-SCNC: 25 MMOL/L (ref 23–29)
CREAT SERPL-MCNC: 0.9 MG/DL (ref 0.5–1.4)
DIFFERENTIAL METHOD: NORMAL
EOSINOPHIL # BLD AUTO: 0.3 K/UL (ref 0–0.5)
EOSINOPHIL NFR BLD: 3.9 % (ref 0–8)
ERYTHROCYTE [DISTWIDTH] IN BLOOD BY AUTOMATED COUNT: 13.8 % (ref 11.5–14.5)
EST. GFR  (NO RACE VARIABLE): >60 ML/MIN/1.73 M^2
GLUCOSE SERPL-MCNC: 93 MG/DL (ref 70–110)
HCT VFR BLD AUTO: 42.1 % (ref 37–48.5)
HDLC SERPL-MCNC: 47 MG/DL (ref 40–75)
HDLC SERPL: 23.7 % (ref 20–50)
HGB BLD-MCNC: 13.7 G/DL (ref 12–16)
IMM GRANULOCYTES # BLD AUTO: 0.04 K/UL (ref 0–0.04)
IMM GRANULOCYTES NFR BLD AUTO: 0.5 % (ref 0–0.5)
LDLC SERPL CALC-MCNC: 105.6 MG/DL (ref 63–159)
LYMPHOCYTES # BLD AUTO: 2.5 K/UL (ref 1–4.8)
LYMPHOCYTES NFR BLD: 30.1 % (ref 18–48)
MCH RBC QN AUTO: 29.1 PG (ref 27–31)
MCHC RBC AUTO-ENTMCNC: 32.5 G/DL (ref 32–36)
MCV RBC AUTO: 90 FL (ref 82–98)
MONOCYTES # BLD AUTO: 0.6 K/UL (ref 0.3–1)
MONOCYTES NFR BLD: 6.8 % (ref 4–15)
NEUTROPHILS # BLD AUTO: 4.7 K/UL (ref 1.8–7.7)
NEUTROPHILS NFR BLD: 57.4 % (ref 38–73)
NONHDLC SERPL-MCNC: 151 MG/DL
NRBC BLD-RTO: 0 /100 WBC
PLATELET # BLD AUTO: 295 K/UL (ref 150–450)
PMV BLD AUTO: 10.3 FL (ref 9.2–12.9)
POTASSIUM SERPL-SCNC: 3.9 MMOL/L (ref 3.5–5.1)
PROT SERPL-MCNC: 8.1 G/DL (ref 6–8.4)
RBC # BLD AUTO: 4.7 M/UL (ref 4–5.4)
SODIUM SERPL-SCNC: 139 MMOL/L (ref 136–145)
TRIGL SERPL-MCNC: 227 MG/DL (ref 30–150)
TSH SERPL DL<=0.005 MIU/L-ACNC: 2.49 UIU/ML (ref 0.4–4)
WBC # BLD AUTO: 8.21 K/UL (ref 3.9–12.7)

## 2022-09-08 ENCOUNTER — OFFICE VISIT (OUTPATIENT)
Dept: OPHTHALMOLOGY | Facility: CLINIC | Age: 82
End: 2022-09-08
Payer: MEDICARE

## 2022-09-08 DIAGNOSIS — Z96.1 PSEUDOPHAKIA OF BOTH EYES: Primary | ICD-10-CM

## 2022-09-08 DIAGNOSIS — H52.4 PRESBYOPIA: ICD-10-CM

## 2022-09-08 PROCEDURE — 99999 PR PBB SHADOW E&M-EST. PATIENT-LVL II: CPT | Mod: PBBFAC,,, | Performed by: OPTOMETRIST

## 2022-09-08 PROCEDURE — 99212 OFFICE O/P EST SF 10 MIN: CPT | Mod: PBBFAC | Performed by: OPTOMETRIST

## 2022-09-08 PROCEDURE — 92014 COMPRE OPH EXAM EST PT 1/>: CPT | Mod: S$PBB,,, | Performed by: OPTOMETRIST

## 2022-09-08 PROCEDURE — 92014 PR EYE EXAM, EST PATIENT,COMPREHESV: ICD-10-PCS | Mod: S$PBB,,, | Performed by: OPTOMETRIST

## 2022-09-08 PROCEDURE — 99999 PR PBB SHADOW E&M-EST. PATIENT-LVL II: ICD-10-PCS | Mod: PBBFAC,,, | Performed by: OPTOMETRIST

## 2022-09-08 NOTE — PROGRESS NOTES
HPI    6 Month F/u   S/p PCIOL OU CPG   PCIOL OD 11/17/21   PCIOL OS 1/19/22  +27.5 [id number]        Last edited by Tammy Umanzor MA on 9/8/2022  1:59 PM.            Assessment /Plan     For exam results, see Encounter Report.    Pseudophakia of both eyes  Stable OU  Monitor 12 months    Presbyopia  C/w OTC readers prn      RTC 1 yr for dilated eye exam or PRN if any problems.   Discussed above and answered questions.

## 2022-09-14 ENCOUNTER — HOSPITAL ENCOUNTER (OUTPATIENT)
Dept: RADIOLOGY | Facility: HOSPITAL | Age: 82
Discharge: HOME | End: 2022-09-14
Attending: FAMILY MEDICINE
Payer: MEDICARE

## 2022-09-14 VITALS — BODY MASS INDEX: 26.31 KG/M2 | HEIGHT: 62 IN | WEIGHT: 143 LBS

## 2022-09-14 DIAGNOSIS — Z12.31 ENCOUNTER FOR SCREENING MAMMOGRAM FOR MALIGNANT NEOPLASM OF BREAST: ICD-10-CM

## 2022-09-14 PROCEDURE — 77067 SCR MAMMO BI INCL CAD: CPT | Mod: TC

## 2022-09-14 PROCEDURE — 77067 MAMMO DIGITAL SCREENING BILAT WITH TOMO: ICD-10-PCS | Mod: 26,,, | Performed by: RADIOLOGY

## 2022-09-14 PROCEDURE — 77063 MAMMO DIGITAL SCREENING BILAT WITH TOMO: ICD-10-PCS | Mod: 26,,, | Performed by: RADIOLOGY

## 2022-09-14 PROCEDURE — 77063 BREAST TOMOSYNTHESIS BI: CPT | Mod: 26,,, | Performed by: RADIOLOGY

## 2022-09-14 PROCEDURE — 77067 SCR MAMMO BI INCL CAD: CPT | Mod: 26,,, | Performed by: RADIOLOGY

## 2022-09-22 ENCOUNTER — HOSPITAL ENCOUNTER (OUTPATIENT)
Dept: RADIOLOGY | Facility: HOSPITAL | Age: 82
Discharge: HOME | End: 2022-09-22
Attending: FAMILY MEDICINE
Payer: MEDICARE

## 2022-09-22 ENCOUNTER — TELEPHONE (OUTPATIENT)
Dept: RADIOLOGY | Facility: HOSPITAL | Age: 82
End: 2022-09-22

## 2022-09-22 VITALS — HEIGHT: 62 IN | BODY MASS INDEX: 26.31 KG/M2 | WEIGHT: 143 LBS

## 2022-09-22 DIAGNOSIS — R92.8 ABNORMAL MAMMOGRAM: Primary | ICD-10-CM

## 2022-09-22 DIAGNOSIS — R92.8 ABNORMAL MAMMOGRAM: ICD-10-CM

## 2022-09-22 PROCEDURE — 77061 MAMMO DIGITAL DIAGNOSTIC LEFT WITH TOMO: ICD-10-PCS | Mod: 26,LT,, | Performed by: RADIOLOGY

## 2022-09-22 PROCEDURE — 76642 ULTRASOUND BREAST LIMITED: CPT | Mod: 26,LT,, | Performed by: RADIOLOGY

## 2022-09-22 PROCEDURE — 77061 BREAST TOMOSYNTHESIS UNI: CPT | Mod: 26,LT,, | Performed by: RADIOLOGY

## 2022-09-22 PROCEDURE — 77065 MAMMO DIGITAL DIAGNOSTIC LEFT WITH TOMO: ICD-10-PCS | Mod: 26,LT,, | Performed by: RADIOLOGY

## 2022-09-22 PROCEDURE — 76642 ULTRASOUND BREAST LIMITED: CPT | Mod: TC,LT

## 2022-09-22 PROCEDURE — 76642 US BREAST LEFT LIMITED: ICD-10-PCS | Mod: 26,LT,, | Performed by: RADIOLOGY

## 2022-09-22 PROCEDURE — 77065 DX MAMMO INCL CAD UNI: CPT | Mod: TC,LT

## 2022-09-22 PROCEDURE — 77065 DX MAMMO INCL CAD UNI: CPT | Mod: 26,LT,, | Performed by: RADIOLOGY

## 2022-09-22 NOTE — TELEPHONE ENCOUNTER
Ultrasound guided biopsy scheduled with patient and her  for October 4th at 10am, arrival time 9:30am at the [place] location, biopsy instructions given to patients  and understandings verbalized. Patients  has my contact information.

## 2022-10-04 ENCOUNTER — HOSPITAL ENCOUNTER (OUTPATIENT)
Dept: RADIOLOGY | Facility: HOSPITAL | Age: 82
Discharge: HOME | End: 2022-10-04
Attending: FAMILY MEDICINE
Payer: MEDICARE

## 2022-10-04 DIAGNOSIS — R92.8 ABNORMAL MAMMOGRAM: ICD-10-CM

## 2022-10-04 PROCEDURE — 88342 IMHCHEM/IMCYTCHM 1ST ANTB: CPT | Performed by: PATHOLOGY

## 2022-10-04 PROCEDURE — 88305 TISSUE EXAM BY PATHOLOGIST: ICD-10-PCS | Mod: 26,,, | Performed by: PATHOLOGY

## 2022-10-04 PROCEDURE — 88360 PR  TUMOR IMMUNOHISTOCHEM/MANUAL: ICD-10-PCS | Mod: 26,,, | Performed by: PATHOLOGY

## 2022-10-04 PROCEDURE — 88360 TUMOR IMMUNOHISTOCHEM/MANUAL: CPT | Mod: 26,,, | Performed by: PATHOLOGY

## 2022-10-04 PROCEDURE — 19083 BX BREAST 1ST LESION US IMAG: CPT | Mod: LT

## 2022-10-04 PROCEDURE — 88305 TISSUE EXAM BY PATHOLOGIST: CPT | Mod: 59 | Performed by: PATHOLOGY

## 2022-10-04 PROCEDURE — 19083 BX BREAST 1ST LESION US IMAG: CPT | Mod: LT,,, | Performed by: RADIOLOGY

## 2022-10-04 PROCEDURE — 19081 BX BREAST 1ST LESION STRTCTC: CPT | Mod: LT

## 2022-10-04 PROCEDURE — 77065 DX MAMMO INCL CAD UNI: CPT | Mod: TC,LT

## 2022-10-04 PROCEDURE — 88342 CHG IMMUNOCYTOCHEMISTRY: ICD-10-PCS | Mod: 26,XU,, | Performed by: PATHOLOGY

## 2022-10-04 PROCEDURE — 88360 TUMOR IMMUNOHISTOCHEM/MANUAL: CPT | Performed by: PATHOLOGY

## 2022-10-04 PROCEDURE — 19081 MAMMO BREAST STEREOTACTIC BREAST BIOPSY LEFT: ICD-10-PCS | Mod: LT,,, | Performed by: RADIOLOGY

## 2022-10-04 PROCEDURE — 19083 US BREAST BIOPSY WITH IMAGING 1ST SITE LEFT: ICD-10-PCS | Mod: LT,,, | Performed by: RADIOLOGY

## 2022-10-04 PROCEDURE — 88305 TISSUE EXAM BY PATHOLOGIST: CPT | Mod: 26,,, | Performed by: PATHOLOGY

## 2022-10-04 PROCEDURE — 77065 DX MAMMO INCL CAD UNI: CPT | Mod: 26,LT,, | Performed by: RADIOLOGY

## 2022-10-04 PROCEDURE — 88342 IMHCHEM/IMCYTCHM 1ST ANTB: CPT | Mod: 26,XU,, | Performed by: PATHOLOGY

## 2022-10-04 PROCEDURE — 19081 BX BREAST 1ST LESION STRTCTC: CPT | Mod: LT,,, | Performed by: RADIOLOGY

## 2022-10-04 PROCEDURE — 77065 MAMMO DIGITAL DIAGNOSTIC LEFT: ICD-10-PCS | Mod: 26,LT,, | Performed by: RADIOLOGY

## 2022-10-04 NOTE — NURSING
Pressure held on leftbreast biopsy site x2 for 10 mins, hemostasis was achieved, steri strips were applied, and wound was covered with 4x4 guaze and a tegaderm.  Dressing clean, dry and intact with no drainage noted.  Discharge instructions given verbally and in writing, patient voiced understandings.  Patient discharged and accompanied by family member.

## 2022-10-10 ENCOUNTER — TELEPHONE (OUTPATIENT)
Dept: SURGERY | Facility: CLINIC | Age: 82
End: 2022-10-10
Payer: MEDICARE

## 2022-10-10 LAB
COMMENT: NORMAL
FINAL PATHOLOGIC DIAGNOSIS: NORMAL
GROSS: NORMAL
Lab: NORMAL

## 2022-10-10 NOTE — TELEPHONE ENCOUNTER
Navigator left voicemail message with name and call back number [phone] to call back to go over results of recent breast biopsy

## 2022-10-10 NOTE — TELEPHONE ENCOUNTER
Navigator called patient to discuss results from recent breast biopsy. Left a voicemail message with my name and call back number [phone]    I will continue trying to reach patient      ----- Message from Jeff Liriano MD sent at 10/10/2022 12:26 PM CDT -----  Malignant and concordant.         Note that the invasive ductal result corresponds to the mammographic lesion with the other positive site corresponding to a sonographic abnormality which did not correspond to the original mammographic lesion.  Both sites are concordant with the ultrasound biopsy clip just lateral to the area sampled.  No definite mammographic abnormality appreciated at the site of ultrasound biopsy.

## 2022-10-11 ENCOUNTER — TELEPHONE (OUTPATIENT)
Dept: SURGERY | Facility: CLINIC | Age: 82
End: 2022-10-11
Payer: MEDICARE

## 2022-10-11 NOTE — TELEPHONE ENCOUNTER
Called Patient with results of breast biopsy from 10/4/2022. Explained that the biopsy showed Left breast Invasive Mammary Carcinoma and Left IDC . Discussed what this means and that the next step is to meet with a breast surgeon. Patient was give the option to schedule with General Surgery, Dr Guillen or Dr Jackson or Breast Specialist Dr Leavitt. Patient opted for Dr Leavitt An appt was made for 10/13/2022 with Dr. Leavitt.    Reviewed location of breast center, explained the role of the nurse navigator, and provided my direct number for any questions/concerns. Patient verbalized understanding.     Ordering provider attached to this documentation      ----- Message from Jeff Liriano MD sent at 10/10/2022 12:26 PM CDT -----  Malignant and concordant.         Note that the invasive ductal result corresponds to the mammographic lesion with the other positive site corresponding to a sonographic abnormality which did not correspond to the original mammographic lesion.  Both sites are concordant with the ultrasound biopsy clip just lateral to the area sampled.  No definite mammographic abnormality appreciated at the site of ultrasound biopsy.

## 2022-10-13 ENCOUNTER — OFFICE VISIT (OUTPATIENT)
Dept: SURGERY | Facility: CLINIC | Age: 82
End: 2022-10-13
Payer: MEDICARE

## 2022-10-13 DIAGNOSIS — C50.412 MALIGNANT NEOPLASM OF UPPER-OUTER QUADRANT OF LEFT BREAST IN FEMALE, ESTROGEN RECEPTOR POSITIVE: ICD-10-CM

## 2022-10-13 DIAGNOSIS — C50.112 MALIGNANT NEOPLASM OF CENTRAL PORTION OF LEFT BREAST IN FEMALE, ESTROGEN RECEPTOR POSITIVE: Primary | ICD-10-CM

## 2022-10-13 DIAGNOSIS — Z01.818 PRE-OP TESTING: Primary | ICD-10-CM

## 2022-10-13 DIAGNOSIS — Z17.0 MALIGNANT NEOPLASM OF CENTRAL PORTION OF LEFT BREAST IN FEMALE, ESTROGEN RECEPTOR POSITIVE: Primary | ICD-10-CM

## 2022-10-13 DIAGNOSIS — C50.112 MALIGNANT NEOPLASM OF CENTRAL PORTION OF LEFT BREAST IN FEMALE, ESTROGEN RECEPTOR POSITIVE: ICD-10-CM

## 2022-10-13 DIAGNOSIS — Z17.0 MALIGNANT NEOPLASM OF CENTRAL PORTION OF LEFT BREAST IN FEMALE, ESTROGEN RECEPTOR POSITIVE: ICD-10-CM

## 2022-10-13 DIAGNOSIS — Z17.0 MALIGNANT NEOPLASM OF UPPER-OUTER QUADRANT OF LEFT BREAST IN FEMALE, ESTROGEN RECEPTOR POSITIVE: ICD-10-CM

## 2022-10-13 PROCEDURE — 99999 PR PBB SHADOW E&M-EST. PATIENT-LVL II: ICD-10-PCS | Mod: PBBFAC,,, | Performed by: SURGERY

## 2022-10-13 PROCEDURE — 99205 OFFICE O/P NEW HI 60 MIN: CPT | Mod: S$PBB,,, | Performed by: SURGERY

## 2022-10-13 PROCEDURE — 99999 PR PBB SHADOW E&M-EST. PATIENT-LVL II: CPT | Mod: PBBFAC,,, | Performed by: SURGERY

## 2022-10-13 PROCEDURE — 99212 OFFICE O/P EST SF 10 MIN: CPT | Mod: PBBFAC | Performed by: SURGERY

## 2022-10-13 PROCEDURE — 99205 PR OFFICE/OUTPT VISIT, NEW, LEVL V, 60-74 MIN: ICD-10-PCS | Mod: S$PBB,,, | Performed by: SURGERY

## 2022-10-13 NOTE — PROGRESS NOTES
Breast Surgical Oncology  [place]    Date of Service: 10/13/2022    SUBJECTIVE:   Chief complaint: breast cancer    HISTORY OF PRESENT ILLNESS:   Vanessa Vazquez is a 82 y.o. female who is kindly referred by Dr. Magui Jc for left breast cancer.    The patient has newly diagnosed left invasive duct cell carcinoma by way of abnormal screening mammogram. She was seen today along with her gonzález . She has a diagnosis of dementia but is overall healthy and functions well. She works at an [employer]. She denies breast concerns such as pain, masses, skin changes, nipple discharge, nipple retraction or lumps under the arm. She had an abnormal mammogram and ultrasound in 2018 for which she underwent US core needle biopsy which was benign. Her recent mammogram detected an asymmetry and diagnostic ultrasound identified a central mass which was biopsy positive cancer. On post biopsy diagnostic mammography, the biopsy clip did not correlate with the mammographic asymmetry. She then underwent a stereotactic biopsy of the UOQ finding which was also biopsy positive cancer.    Her breast cancer risk factor profile is as follows: Menarche at 14, Menopause at 50.  She is . Age at first live birth was 25. Family history of cancer is as follows: mother with colon cancer at age 101- cause of death at age 101.    FAMILY HISTORY:     Family History   Problem Relation Age of Onset    Glaucoma Brother     Hyperlipidemia Mother     Heart disease Father     Osteoporosis Cousin     Coronary artery disease Maternal Uncle         PAST MEDICAL HISTORY:     Past Medical History:   Diagnosis Date    CKD (chronic kidney disease), stage III     Dementia     Fibrocystic breast disease     Hearing loss     Hyperlipidemia     Osteoarthritis of thumb     Osteopenia        SURGICAL HISTORY:     Past Surgical History:   Procedure Laterality Date    BREAST BIOPSY Left 2018    Benign    CATARACT EXTRACTION EXTRACAPSULAR W/  INTRAOCULAR LENS IMPLANTATION         SOCIAL HISTORY:     Social History     Tobacco Use    Smoking status: Never    Smokeless tobacco: Never   Substance Use Topics    Alcohol use: No    Drug use: No        MEDICATIONS/ALLERGIES:     Current Outpatient Medications:     aspirin (ECOTRIN) 81 MG EC tablet, Take 81 mg by mouth once daily., Disp: , Rfl:     donepeziL (ARICEPT) 10 MG tablet, TAKE ONE TABLET BY MOUTH ONE TIME DAILY, Disp: 30 tablet, Rfl: 11    gatifloxacin 0.5 % Drop drops, Apply 1 drop to eye 2 (two) times daily. Eyedrops to start one day before surgery, Disp: 5 mL, Rfl: 2    ketorolac 0.5% (ACULAR) 0.5 % Drop, Place 1 drop into the left eye 4 (four) times daily. Eyedrops to start one day before surgery, Disp: 5 mL, Rfl: 1    moxifloxacin (VIGAMOX) 0.5 % ophthalmic solution, Place 1 drop into the right eye every 12 (twelve) hours. Start eyedrops one day before surgery, Disp: 5 mL, Rfl: 2    pravastatin (PRAVACHOL) 40 MG tablet, TAKE ONE TABLET BY MOUTH ONE TIME DAILY, Disp: 90 tablet, Rfl: 3    prednisoLONE acetate (PRED FORTE) 1 % DrpS, Place 1 drop into the left eye 4 (four) times daily. Eyedrops to start one day before surgery, Disp: 5 mL, Rfl: 1    vitamin D (VITAMIN D3) 1000 units Tab, Take 1,000 Units by mouth once daily., Disp: , Rfl:   Review of patient's allergies indicates:  No Known Allergies    REVIEW OF SYSTEMS:   I have reviewed 12 systems, including 2 points per system. Pertinent reported positives are: frequent urination, on blood thinners, and hearing loss.    PHYSICAL EXAM:   General: The patient appears well and is in no acute distress.     BREAST EXAM  No Asymmetry  Right:  - Mass: No  - Skin change: No  - Nipple Discharge: No  - Nipple retraction: No  - Axillary LAD: No  Left:   - Mass: No  - Skin change: No  - Nipple Discharge: No  - Nipple retraction: No  - Axillary LAD: No    IMAGING:   Images were personally reviewed.     Results for orders placed during the hospital encounter of  09/22/22    Mammo Digital Diagnostic Left with Enrique    Narrative  Result:  Mammo Digital Diagnostic Left with Enrique  US Breast Left Limited    History:  Patient is 82 y.o. and is seen for diagnostic imaging.    Films Compared:  Prior images (if available) were compared.    Findings:  This procedure was performed using tomosynthesis. Computer-aided detection was utilized in the interpretation of this examination.  Left  Mammo Digital Diagnostic Left with Enrique  The left breast has scattered areas of fibroglandular density. Asymmetry/mass with architectural distortion persists within the upper inner breast.    Ultrasound was performed and demonstrates an irregular hypoechoic mass measuring 1.7 x 1.2 cm at the 10 o'clock position. No concerning axillary lymph nodes.    Impression  Suspicious mass at the 10 o'clock position.      BI-RADS Category:  Overall: 4 - Suspicious      Recommendation:  Ultrasound Biopsy is recommended.      Your estimated lifetime risk of breast cancer (to age 85) based on Tyrer-Cuzick risk assessment model is Tyrer-Cuzick: 0.37 %. According to the American Cancer Society, patients with a lifetime breast cancer risk of 20% or higher might benefit from supplemental screening tests.      Results for orders placed during the hospital encounter of 11/21/18    Mammo Digital Diagnostic Left w/ Enrique    Narrative  Result:  Mammo Digital Diagnostic Left w/ Enrique  US Breast Left Limited    History:  Patient is 78 y.o. and is seen for a diagnostic mammogram.    Films Compared:  Prior images (if available) were compared.    Findings:  This procedure was performed using tomosynthesis.  Computer-aided detection was utilized in the interpretation of this examination.  Mammo Digital Diagnostic Left w/ Enrique  The breast has scattered areas of fibroglandular density. There is no evidence of suspicious masses, calcifications, or other abnormal findings.    Post biopsy clip noted within the 3:00 position of the left  breast. No mammographic abnormality appreciated. Previously discussed 11 mm mass at the lower inner anterior breast is not definitely visualized. There is a stable benign oil cysts noted.        US Breast Left Limited    Ultrasound of the 9 o'clock position demonstrates decrease in size of the previously suspected mildly complex cyst. This may in fact represent focal area of ductal dilatation. There are mildly prominent adjacent ducts which are debris-filled without concerning intraductal mass    Impression  There is no mammographic or sonographic evidence of malignancy.    BI-RADS Category:  Left: 2 - Benign  Overall: 2 - Benign    Recommendation:  Patient is due for routine screening mammogram May 2019.      The patient's estimated lifetime risk of breast cancer (to age 85) based on Tyrer-Cuzick - 7 risk assessment model is: Tyrer-Cuzick: 2.17 %. According to the American Cancer Society,  patients with a lifetime breast cancer risk of 20% or higher might benefit from supplemental screening tests.      Results for orders placed during the hospital encounter of 05/09/18    Mammo Digital Diagnostic Left with Tomosynthesis_CAD    Narrative  Result:  Mammo Digital Diagnostic Left with Tomosynthesis_CAD  US Breast Left Limited    History:  Patient is 77 y.o. and is seen for a diagnostic mammogram.    Films Compared:  04/27/2018 Mammo Digital Screening Bilat with Tomosynthesis_CAD, and 03/15/2017 Mammo Digital Screening Bilat with Tomosynthesis_CAD    Findings:  This procedure was performed using tomosynthesis.  Computer-aided detection was utilized in the interpretation of this examination.  Mammo Digital Diagnostic Left with Tomosynthesis_CAD  The breast has scattered areas of fibroglandular density.    There is an 8 mm round mass with obscured margins seen in the left breast at 3 o'clock on the ML view.    There is an 11 mm round mass with circumscribed margins seen in the lower inner quadrant of the left breast in  the anterior depth.    US Breast Left Limited  There is a 5 mm x 4 mm x 5 mm hypoechoic mass with shadowing seen in the left breast at 3 o'clock.    There is a 16 mm x 7 mm x 11 mm complicated cyst seen in the lower inner quadrant of the left breast.    Impression  Left  Cyst: Left breast 16 mm x 7 mm x 11 mm cyst at the lower inner position. Assessment: 3 - Probably benign. Short Interval Follow-Up in 6 Months is recommended.  Mass: Left breast 8 mm mass at the 3 o'clock position. Assessment: 4 - Suspicious finding. Ultrasound-guided biopsy is recommended.    BI-RADS Category:  Left: 4 - Suspicious  Overall: 4 - Suspicious    Recommendation:  Short interval follow-up is recommended in 6 Months.  Ultrasound-guided biopsy is recommended.    The patient's estimated lifetime risk of breast cancer (to age 85) based on Tyrer-Cuzick - 7 risk assessment model is: Tyrer-Cuzick: 2.72 %. According to the American Cancer Society,  patients with a lifetime breast cancer risk of 20% or higher might benefit from supplemental screening tests.      PATHOLOGY:     Lab Results   Component Value Date    FPATHDX  10/04/2022     1. Left breast (10 o'clock position), biopsy:      -  Invasive mammary carcinoma with lobular features      -  Grade 1 of 3 (tubule formation:  3, pleomorphism:  1, mitotic rate:  1)      -  Single focus measuring less than 1 mm in greatest linear dimension      -  Microcalcifications:  Not identified      -  Ductal carcinoma in Situ (DCIS):  Not identified  Comment:  Immunohistochemical stains and breast biomarkers were attempted.  Unfortunately, the focus of tumor was scant the tissue was exhausted prior to  staining.  Stains were therefore noncontributory and the billing will be  adjusted accordingly.  2. Left breast, biopsy:      -  Invasive ductal carcinoma with lobular features      -  Grade 1 of 3 (tubule formation: 3, pleomorphism: 1, mitotic rate: 1)      -  At least 0.8 cm in greatest linear  dimension      -  Microcalcifications:  Not identified      -  Ductal carcinoma in situ (DCIS): Not identified      -  Breast biomarkers:           ER:   Positive (strong, greater than 95%)           WY:   Positive (strong, greater than 95%)           Her2:  Negative (0)           Ki67:  15%  Comment: An immunohistochemical stain for E-cadherin shows tumor cell  positivity, confirming ductal origin           ASSESSMENT:     1. Malignant neoplasm of central portion of left breast in female, estrogen receptor positive    2. Malignant neoplasm of upper-outer quadrant of left breast in female, estrogen receptor positive          PLAN:     Vanessa Vazquez is a 82 y.o. female who is new diagnosis of Stage IA (T1c N0 Mx) LEFT Breast Invasive Ductal  Carcinoma, Grade 1, ER >95%, WY >95%, [id number] with a ki67 of 15%. I have reviewed her imaging and pathology reports with her and I have provided her with copies.    I recommend a MRI to evaluate the extent of disease within the left breast and to determine if there is additional disease in the right breast.     We have discussed the surgical choices of lumpectomy with radiation and mastectomy with or without radiation.  I have explained that the survival is the same, regardless of the surgery chosen.  We have discussed that the local recurrence rate following mastectomy is 2-5%.  I have explained that the local recurrence rate was slightly higher following breast conservation in the large trials that compared mastectomy and breast conservation. However, with current medical therapies, local recurrence has been reduced to as low as 6%. I did review with her the general schedule and side effects of radiation. She will be referred to radiation oncology.     We reviewed the need for sentinel lymph node biopsy to further stage the axilla.       Because her cancer is hormone receptor positive, she will be recommended for endocrine therapy.  The decision for or against adjuvant  chemotherapy will be made following surgery. She understands that she will be referred to a medical oncologist to discuss these points further.    I have provided her with general information regarding the supportive services available here including patient navigation, nutritional services, preoperative and postoperative physical therapy programs, and genetic counseling.      I spent a total of 60 minutes on this visit. This includes face to face time and non-face to face time preparing to see the patient (eg, review of tests), obtaining and/or reviewing separately obtained history, documenting clinical information in the electronic or other health record, independently interpreting results and communicating results to the patient/family/caregiver, or care coordinator.        Sabrina Leavitt M.D.

## 2022-10-14 ENCOUNTER — TELEPHONE (OUTPATIENT)
Dept: SURGERY | Facility: CLINIC | Age: 82
End: 2022-10-14
Payer: MEDICARE

## 2022-10-14 ENCOUNTER — HOSPITAL ENCOUNTER (OUTPATIENT)
Dept: RADIOLOGY | Facility: HOSPITAL | Age: 82
Discharge: HOME | End: 2022-10-14
Attending: SURGERY
Payer: MEDICARE

## 2022-10-14 DIAGNOSIS — Z17.0 MALIGNANT NEOPLASM OF CENTRAL PORTION OF LEFT BREAST IN FEMALE, ESTROGEN RECEPTOR POSITIVE: ICD-10-CM

## 2022-10-14 DIAGNOSIS — C50.112 MALIGNANT NEOPLASM OF CENTRAL PORTION OF LEFT BREAST IN FEMALE, ESTROGEN RECEPTOR POSITIVE: ICD-10-CM

## 2022-10-14 PROCEDURE — C8937 CAD BREAST MRI: HCPCS | Mod: TC

## 2022-10-14 PROCEDURE — 25500020 PHARM REV CODE 255: Performed by: SURGERY

## 2022-10-14 PROCEDURE — 77049 MRI BREAST W/WO CONTRAST, W/CAD, BILATERAL: ICD-10-PCS | Mod: 26,,, | Performed by: RADIOLOGY

## 2022-10-14 PROCEDURE — 77049 MRI BREAST C-+ W/CAD BI: CPT | Mod: 26,,, | Performed by: RADIOLOGY

## 2022-10-14 PROCEDURE — A9577 INJ MULTIHANCE: HCPCS | Performed by: SURGERY

## 2022-10-14 RX ADMIN — GADOBENATE DIMEGLUMINE 14 ML: 529 INJECTION, SOLUTION INTRAVENOUS at 10:10

## 2022-10-14 NOTE — TELEPHONE ENCOUNTER
Spoke to patient's spouse Renan. Scheduled follow up appointment to discuss MRI results and surgical options for Monday 10/17/22 [facility] @ 1020. Mr Urrutia correctly repeated back all appointment information.

## 2022-10-17 ENCOUNTER — OFFICE VISIT (OUTPATIENT)
Dept: SURGERY | Facility: CLINIC | Age: 82
End: 2022-10-17
Payer: MEDICARE

## 2022-10-17 ENCOUNTER — TELEPHONE (OUTPATIENT)
Dept: HEMATOLOGY/ONCOLOGY | Facility: CLINIC | Age: 82
End: 2022-10-17
Payer: MEDICARE

## 2022-10-17 DIAGNOSIS — C50.412 MALIGNANT NEOPLASM OF UPPER-OUTER QUADRANT OF LEFT BREAST IN FEMALE, ESTROGEN RECEPTOR POSITIVE: ICD-10-CM

## 2022-10-17 DIAGNOSIS — Z17.0 MALIGNANT NEOPLASM OF UPPER-OUTER QUADRANT OF LEFT BREAST IN FEMALE, ESTROGEN RECEPTOR POSITIVE: ICD-10-CM

## 2022-10-17 DIAGNOSIS — Z17.0 MALIGNANT NEOPLASM OF CENTRAL PORTION OF LEFT BREAST IN FEMALE, ESTROGEN RECEPTOR POSITIVE: Primary | ICD-10-CM

## 2022-10-17 DIAGNOSIS — C50.112 MALIGNANT NEOPLASM OF CENTRAL PORTION OF LEFT BREAST IN FEMALE, ESTROGEN RECEPTOR POSITIVE: Primary | ICD-10-CM

## 2022-10-17 PROCEDURE — 99214 OFFICE O/P EST MOD 30 MIN: CPT | Mod: S$PBB,,, | Performed by: SURGERY

## 2022-10-17 PROCEDURE — 99999 PR PBB SHADOW E&M-EST. PATIENT-LVL II: ICD-10-PCS | Mod: PBBFAC,,, | Performed by: SURGERY

## 2022-10-17 PROCEDURE — 99214 PR OFFICE/OUTPT VISIT, EST, LEVL IV, 30-39 MIN: ICD-10-PCS | Mod: S$PBB,,, | Performed by: SURGERY

## 2022-10-17 PROCEDURE — 99212 OFFICE O/P EST SF 10 MIN: CPT | Mod: PBBFAC | Performed by: SURGERY

## 2022-10-17 PROCEDURE — 99999 PR PBB SHADOW E&M-EST. PATIENT-LVL II: CPT | Mod: PBBFAC,,, | Performed by: SURGERY

## 2022-10-17 NOTE — PROGRESS NOTES
Breast Surgical Oncology  [place]    Date of Service: 10/17/2022    SUBJECTIVE:   Chief complaint: breast cancer    HISTORY OF PRESENT ILLNESS:   Vanessa Vazquez is a 82 y.o. female who is kindly referred by Dr. Magui Jc for left breast cancer.    10/13/2022  The patient has newly diagnosed left invasive duct cell carcinoma by way of abnormal screening mammogram. She was seen today along with her gonzález . She has a diagnosis of dementia but is overall healthy and functions well. She works at an [employer]. She denies breast concerns such as pain, masses, skin changes, nipple discharge, nipple retraction or lumps under the arm. She had an abnormal mammogram and ultrasound in 2018 for which she underwent US core needle biopsy which was benign. Her recent mammogram detected an asymmetry and diagnostic ultrasound identified a central mass which was biopsy positive cancer. On post biopsy diagnostic mammography, the biopsy clip did not correlate with the mammographic asymmetry. She then underwent a stereotactic biopsy of the UOQ finding which was also biopsy positive cancer.    10/17/2022  Patient here to discuss recent breast MRI results.     Her breast cancer risk factor profile is as follows: Menarche at 14, Menopause at 50.  She is . Age at first live birth was 25. Family history of cancer is as follows: mother with colon cancer at age 101- cause of death at age 101.    FAMILY HISTORY:     Family History   Problem Relation Age of Onset    Glaucoma Brother     Hyperlipidemia Mother     Heart disease Father     Osteoporosis Cousin     Coronary artery disease Maternal Uncle         PAST MEDICAL HISTORY:     Past Medical History:   Diagnosis Date    CKD (chronic kidney disease), stage III     Dementia     Fibrocystic breast disease     Hearing loss     Hyperlipidemia     Osteoarthritis of thumb     Osteopenia        SURGICAL HISTORY:     Past Surgical History:   Procedure Laterality Date     BREAST BIOPSY Left 05/2018    Benign    CATARACT EXTRACTION EXTRACAPSULAR W/ INTRAOCULAR LENS IMPLANTATION         SOCIAL HISTORY:     Social History     Tobacco Use    Smoking status: Never    Smokeless tobacco: Never   Substance Use Topics    Alcohol use: No    Drug use: No        MEDICATIONS/ALLERGIES:     Current Outpatient Medications:     aspirin (ECOTRIN) 81 MG EC tablet, Take 81 mg by mouth once daily., Disp: , Rfl:     donepeziL (ARICEPT) 10 MG tablet, TAKE ONE TABLET BY MOUTH ONE TIME DAILY, Disp: 30 tablet, Rfl: 11    gatifloxacin 0.5 % Drop drops, Apply 1 drop to eye 2 (two) times daily. Eyedrops to start one day before surgery, Disp: 5 mL, Rfl: 2    ketorolac 0.5% (ACULAR) 0.5 % Drop, Place 1 drop into the left eye 4 (four) times daily. Eyedrops to start one day before surgery, Disp: 5 mL, Rfl: 1    moxifloxacin (VIGAMOX) 0.5 % ophthalmic solution, Place 1 drop into the right eye every 12 (twelve) hours. Start eyedrops one day before surgery, Disp: 5 mL, Rfl: 2    pravastatin (PRAVACHOL) 40 MG tablet, TAKE ONE TABLET BY MOUTH ONE TIME DAILY, Disp: 90 tablet, Rfl: 3    prednisoLONE acetate (PRED FORTE) 1 % DrpS, Place 1 drop into the left eye 4 (four) times daily. Eyedrops to start one day before surgery, Disp: 5 mL, Rfl: 1    vitamin D (VITAMIN D3) 1000 units Tab, Take 1,000 Units by mouth once daily., Disp: , Rfl:   Review of patient's allergies indicates:  No Known Allergies    REVIEW OF SYSTEMS:   I have reviewed 12 systems, including 2 points per system. Pertinent reported positives are: frequent urination, on blood thinners, and hearing loss.    PHYSICAL EXAM:   General: The patient appears well and is in no acute distress.     BREAST EXAM  No Asymmetry  Right:  - Mass: No  - Skin change: No  - Nipple Discharge: No  - Nipple retraction: No  - Axillary LAD: No  Left:   - Mass: No  - Skin change: No  - Nipple Discharge: No  - Nipple retraction: No  - Axillary LAD: No    IMAGING:   Images were  personally reviewed.     Results for orders placed during the hospital encounter of 10/14/22    MRI Breast w/wo Contrast, w/CAD, Bilateral    Narrative  Result:  MRI Breast w/wo Contrast, w/CAD, Bilateral    History:  Patient is 82 y.o. and is seen for malignant neoplasm of central portion of left breast in female, estrogen receptor positive.    Films Compared:  Compared to: 10/04/2022 US Breast Biopsy with Imaging 1st site Left, 10/04/2022 Mammo Digital Diagnostic Left, 10/04/2022 Mammo Breast Stereotactic Biopsy Left, 09/22/2022 US Breast Left Limited, 09/22/2022 Mammo Digital Diagnostic Left with Enrique, 09/14/2022 Mammo Digital Screening Bilat w/ Enrique, 09/03/2021 Mammo Digital Screening Bilat w/ Enrique, 12/11/2019 Mammo Digital Screening Bilat w/ Enrique, 11/21/2018 Mammo Digital Diagnostic Left w/ Enrique, 11/21/2018 US Breast Left Limited, 05/17/2018 US Breast Biopsy with Imaging 1st site L, 05/17/2018 Mammo Digital Diagnostic Post Procedure_Mammo Guide_Clip_Needle Loc only, 05/09/2018 Mammo Digital Diagnostic Left with Tomosynthesis_CAD, 05/09/2018 US Breast Left Limited, 04/27/2018 Mammo Digital Screening Bilat with Tomosynthesis_CAD, 03/15/2017 Mammo Digital Screening Bilat with Tomosynthesis_CAD, 03/10/2016 Mammo Digital Screening Bilat with Tomosynthesis_CAD, 02/19/2015 Mammo Digital Screening Bilat with Tomosynthesis_CAD, and 11/18/2013 Mammo Digital Screening Bilat with CAD    Technique:  A routine breast MRI was performed with a dedicated breast coil. Pre-contrast STIR were acquired. Then, pre and post contrast T1 weighted fat saturated images were acquired and subtracted with MIP reconstruction. 15 ml of intravenous gadolinium contrast was administered. The study was reviewed with [employer] software.    Findings:  Left  There is a non-mass enhancement seen in the retroareolar region of the left breast with associated post-biopsy changes.  This area correlates to a biopsy proven malignancy (recently  Performed via  ultrasound).  Clip marker in place. This area measured up to 17 mm on the recent sonogram.    There is a non-mass enhancement seen in the upper inner quadrant of the left breast at 10 o'clock.   This area also correlates to a biopsy proven malignancy with area measuring approximately 22 mm .    The more posteriorly biopsied lesion is located 5.2 cm posteromedial to the retroareolar lesion on cc view on the recent mammogram  and just over 5 cm posterosuperior to it as measured on ml view.  MRI demonstrates non-mass enhancement between these lesions as well, concerning for disease involvement.    There is a non-mass enhancement seen in the central region of the left breast with a few of these areas within this region appearing more linear. See image [id number] sub 1 sequence.  This area is located 6 cm from the nipple and extends for 3 cm posteriorly.  There are associated type I kinetics.  If breast conservation is desired or if it would affect management, would also recommend biopsy via MRI of this area.    Right  There is no evidence of suspicious masses, abnormal enhancement, or other abnormal findings in the right breast.    Impression  Left  Non-mass Enhancement: Left breast non-mass enhancement at the retroareolar position. Assessment: 6 - Known biopsy, proven malignancy.  Non-mass Enhancement: Left breast non-mass enhancement at the upper inner 10 o'clock position. Assessment: 6 - Known biopsy, proven malignancy.  Non-mass Enhancement: Left breast non-mass enhancement at the central position. Assessment: 4 - Suspicious finding. Biopsy is recommended.    Right  There is no MR evidence of malignancy in the right breast.    BI-RADS Category:  Overall: 4 - Suspicious    Recommendation:  MRI Biopsy can be performed for non-mass enhancement central left breast if it would affect management as above.    Surgical consult for left breast recommended.      Results for orders placed during the hospital encounter of  09/22/22    Mammo Digital Diagnostic Left with Enrique    Narrative  Result:  Mammo Digital Diagnostic Left with Enrique  US Breast Left Limited    History:  Patient is 82 y.o. and is seen for diagnostic imaging.    Films Compared:  Prior images (if available) were compared.    Findings:  This procedure was performed using tomosynthesis. Computer-aided detection was utilized in the interpretation of this examination.  Left  Mammo Digital Diagnostic Left with Enrique  The left breast has scattered areas of fibroglandular density. Asymmetry/mass with architectural distortion persists within the upper inner breast.    Ultrasound was performed and demonstrates an irregular hypoechoic mass measuring 1.7 x 1.2 cm at the 10 o'clock position. No concerning axillary lymph nodes.    Impression  Suspicious mass at the 10 o'clock position.      BI-RADS Category:  Overall: 4 - Suspicious      Recommendation:  Ultrasound Biopsy is recommended.      Your estimated lifetime risk of breast cancer (to age 85) based on Tyrer-Cuzick risk assessment model is Tyrer-Cuzick: 0.37 %. According to the American Cancer Society, patients with a lifetime breast cancer risk of 20% or higher might benefit from supplemental screening tests.      Results for orders placed during the hospital encounter of 11/21/18    Mammo Digital Diagnostic Left w/ Enrique    Narrative  Result:  Mammo Digital Diagnostic Left w/ Enrique  US Breast Left Limited    History:  Patient is 78 y.o. and is seen for a diagnostic mammogram.    Films Compared:  Prior images (if available) were compared.    Findings:  This procedure was performed using tomosynthesis.  Computer-aided detection was utilized in the interpretation of this examination.  Mammo Digital Diagnostic Left w/ Enrique  The breast has scattered areas of fibroglandular density. There is no evidence of suspicious masses, calcifications, or other abnormal findings.    Post biopsy clip noted within the 3:00 position of the left  breast. No mammographic abnormality appreciated. Previously discussed 11 mm mass at the lower inner anterior breast is not definitely visualized. There is a stable benign oil cysts noted.        US Breast Left Limited    Ultrasound of the 9 o'clock position demonstrates decrease in size of the previously suspected mildly complex cyst. This may in fact represent focal area of ductal dilatation. There are mildly prominent adjacent ducts which are debris-filled without concerning intraductal mass    Impression  There is no mammographic or sonographic evidence of malignancy.    BI-RADS Category:  Left: 2 - Benign  Overall: 2 - Benign    Recommendation:  Patient is due for routine screening mammogram May 2019.      The patient's estimated lifetime risk of breast cancer (to age 85) based on Tyrer-Cuzick - 7 risk assessment model is: Tyrer-Cuzick: 2.17 %. According to the American Cancer Society,  patients with a lifetime breast cancer risk of 20% or higher might benefit from supplemental screening tests.      Results for orders placed during the hospital encounter of 05/09/18    Mammo Digital Diagnostic Left with Tomosynthesis_CAD    Narrative  Result:  Mammo Digital Diagnostic Left with Tomosynthesis_CAD  US Breast Left Limited    History:  Patient is 77 y.o. and is seen for a diagnostic mammogram.    Films Compared:  04/27/2018 Mammo Digital Screening Bilat with Tomosynthesis_CAD, and 03/15/2017 Mammo Digital Screening Bilat with Tomosynthesis_CAD    Findings:  This procedure was performed using tomosynthesis.  Computer-aided detection was utilized in the interpretation of this examination.  Mammo Digital Diagnostic Left with Tomosynthesis_CAD  The breast has scattered areas of fibroglandular density.    There is an 8 mm round mass with obscured margins seen in the left breast at 3 o'clock on the ML view.    There is an 11 mm round mass with circumscribed margins seen in the lower inner quadrant of the left breast in  the anterior depth.    US Breast Left Limited  There is a 5 mm x 4 mm x 5 mm hypoechoic mass with shadowing seen in the left breast at 3 o'clock.    There is a 16 mm x 7 mm x 11 mm complicated cyst seen in the lower inner quadrant of the left breast.    Impression  Left  Cyst: Left breast 16 mm x 7 mm x 11 mm cyst at the lower inner position. Assessment: 3 - Probably benign. Short Interval Follow-Up in 6 Months is recommended.  Mass: Left breast 8 mm mass at the 3 o'clock position. Assessment: 4 - Suspicious finding. Ultrasound-guided biopsy is recommended.    BI-RADS Category:  Left: 4 - Suspicious  Overall: 4 - Suspicious    Recommendation:  Short interval follow-up is recommended in 6 Months.  Ultrasound-guided biopsy is recommended.    The patient's estimated lifetime risk of breast cancer (to age 85) based on Tyrer-Cuzick - 7 risk assessment model is: Tyrer-Cuzick: 2.72 %. According to the American Cancer Society,  patients with a lifetime breast cancer risk of 20% or higher might benefit from supplemental screening tests.      PATHOLOGY:     Lab Results   Component Value Date    FPATHDX  10/04/2022     1. Left breast (10 o'clock position), biopsy:      -  Invasive mammary carcinoma with lobular features      -  Grade 1 of 3 (tubule formation:  3, pleomorphism:  1, mitotic rate:  1)      -  Single focus measuring less than 1 mm in greatest linear dimension      -  Microcalcifications:  Not identified      -  Ductal carcinoma in Situ (DCIS):  Not identified  Comment:  Immunohistochemical stains and breast biomarkers were attempted.  Unfortunately, the focus of tumor was scant the tissue was exhausted prior to  staining.  Stains were therefore noncontributory and the billing will be  adjusted accordingly.  2. Left breast, biopsy:      -  Invasive ductal carcinoma with lobular features      -  Grade 1 of 3 (tubule formation: 3, pleomorphism: 1, mitotic rate: 1)      -  At least 0.8 cm in greatest linear  dimension      -  Microcalcifications:  Not identified      -  Ductal carcinoma in situ (DCIS): Not identified      -  Breast biomarkers:           ER:   Positive (strong, greater than 95%)           GA:   Positive (strong, greater than 95%)           Her2:  Negative (0)           Ki67:  15%  Comment: An immunohistochemical stain for E-cadherin shows tumor cell  positivity, confirming ductal origin           ASSESSMENT:     1. Malignant neoplasm of central portion of left breast in female, estrogen receptor positive    2. Malignant neoplasm of upper-outer quadrant of left breast in female, estrogen receptor positive          PLAN:     Vanessa Vazquez is a 82 y.o. female who is new diagnosis of multicentric Stage IA (T1c N0 Mx) LEFT Breast Invasive Ductal  Carcinoma, Grade 1, ER >95%, GA >95%, [id number] 0 with a ki67 of 15%. I have reviewed her imaging and pathology reports with her and I have provided her with copies.    She has multicentric disease based on ultrasound biopsied masses. She has a third suspicious mass (deep central, 5 cm posterior to the retro areolar biopsy positive IDC) detected on the MRI which has not been biopsied. These masses span a large volume of the breast making breast conservation not possible. She and her  understand that she is no longer a candidate for lumpectomy.     I reviewed that in the setting of a mastectomy, radiation will depend on final pathology.     We reviewed the need for sentinel lymph node biopsy to further stage the axilla.       Because her cancer is hormone receptor positive, she will be recommended for endocrine therapy.  The decision for or against adjuvant chemotherapy will be made following surgery. She understands that she will be referred to a medical oncologist to discuss these points further.    I have provided her with general information regarding the supportive services available here including patient navigation, nutritional services, preoperative and  postoperative physical therapy programs, and genetic counseling.      I spent a total of 30 minutes on this visit. This includes face to face time and non-face to face time preparing to see the patient (eg, review of tests), obtaining and/or reviewing separately obtained history, documenting clinical information in the electronic or other health record, independently interpreting results and communicating results to the patient/family/caregiver, or care coordinator.        Sabrina Leavitt M.D.

## 2022-10-17 NOTE — NURSING
Nurse Navigator Note:     Met with patient after her appointment with Dr Leavitt. Gave patient a copy of her pathology and imaging results. I provided patient with a  resource folder and educated her on the materials inside. Advised patient that a referral to Medical Oncology, Radiation Oncology, and Physical Therapy have been placed, staff will be reaching out to schedule appointment's. Also advised patient of the MRI she is scheduled to have on 10/14/2022. Reiterated that Dr Leavitt will present her case to a multidisciplinary team so a complete treatment plan can be formed.     Patient was given a copy of her appointments, Dr. Leavitt's card, and my card. Encouraged her to call me if she has any questions or concerns or would like to schedule any additional appointments. Verbalized understanding of all information.      Oncology Navigation   Intake  Date of Diagnosis: 10/04/22  Cancer Type: Breast (left IDC and IMC)  Internal / External Referral: Internal (Dr. Sabrina Leavitt)  Date of Referral: 10/13/22  Initial Nurse Navigator Contact: 10/17/22  Referral to Initial Contact Timeline (days): 4  Date Worked: 10/17/22  Appointment Date: 10/25/22  Schedule to Appointment Timeline (days): 8  Multiple appointments: Yes     Treatment  Current Status: Staging work-up    Surgery: Planned  Surgical Oncologist: Dr. Sabrina Leavitt  Consult Date: 10/17/22    Medical Oncologist: Dr. Zain Milton  Consult Date: 10/25/22    Radiation Therapy: -- (Referral Placed 10/13/22)  Radiation Oncologist: Dr. Rosa Carbajal, [facility]    Procedures: Biopsy  Biopsy Schedule Date: 10/04/22    Physical Therapy Referral Date: 10/13/22    ER: Positive (greater than 95%)  DE: Positive (greater than 95%)  Her2: Negative ((0))    Radiation Oncologist: Dr. Rosa Carbajal, III    Support Systems: Spouse/significant other  Concerns: Dementia     Acuity      Follow Up  No follow-ups on file.

## 2022-10-17 NOTE — NURSING
1018am: Outgoing call to pt regarding multi-D breast ref per Dr. Leavitt. Pt didn't answer. LVM. Pt scheduled for Multi-D Breast conference on 10/20 at 1230 pm virtually, for HemOnc on 10/25 at 1120 am at [place] with Dr. Milton, and for RadOnc on 10/28 at 11 am at  with Dr. Carbajal.   Oncology Navigation   Intake  Date of Diagnosis: 10/04/22  Cancer Type: Breast (left IDC and IMC)  Internal / External Referral: Internal (Dr. Sabrina Leavitt)  Date of Referral: 10/13/22  Initial Nurse Navigator Contact: 10/17/22  Referral to Initial Contact Timeline (days): 4  Date Worked: 10/17/22  Appointment Date: 10/25/22  Schedule to Appointment Timeline (days): 8  Multiple appointments: Yes     Treatment     Surgical Oncologist: Dr. Sabrina Leavitt  Consult Date: 10/17/22    Medical Oncologist: Dr. Zain Milton  Consult Date: 10/25/22    Radiation Oncologist: Dr. Rosa Carbajal, III    Procedures: Biopsy  Biopsy Schedule Date: 10/04/22       ER: Positive (greater than 95%)  HI: Positive (greater than 95%)  Her2: Negative ((0))    Radiation Oncologist: Dr. Rosa Carbajal, III        Acuity      Follow Up  No follow-ups on file.

## 2022-10-20 ENCOUNTER — TUMOR BOARD CONFERENCE (OUTPATIENT)
Dept: HEMATOLOGY/ONCOLOGY | Facility: CLINIC | Age: 82
End: 2022-10-20
Payer: MEDICARE

## 2022-10-20 NOTE — PROGRESS NOTES
Tumor Board Documentation      Vanessa Vazquez was presented by Sabrina Leavitt MD at our Tumor Board on 10/20/2022, which included representatives from (P) Medical Oncology, Navigation, Radiology, Hematology, Radiation Oncology, Surgical Oncology, Pathology, Genetics.    Vanessa currently presents as (P) a current patient with (P) Breast cancer (Invasive mammary carcinoma and IDC), with history of the following treatments: (P) None.    Additionally, we reviewed previous medical and familial history, history of present illness, and recent lab results along with all available histopathologic and imaging studies. The tumor board considered available treatment options and made the following recommendations:     (P) Surgery, Hormonal Therapy    Surgery recommends left total mastectomy with SNLB; Oncology recommends hormonal therapy.    The following procedures/referrals were also placed: No orders of the defined types were placed in this encounter.      Clinical Trial Status: (P) Not discussed     National site-specific guidelines were discussed with respect to the case.    Tumor board is a meeting of clinicians from various specialty areas who evaluate and discuss patients for whom a multidisciplinary approach is being considered. Final determinations in the plan of care are those of the provider(s). The responsibility for follow up of recommendations given during tumor board is that of the provider.     Sabrina Leavitt MD

## 2022-10-24 ENCOUNTER — PATIENT OUTREACH (OUTPATIENT)
Dept: SURGERY | Facility: CLINIC | Age: 82
End: 2022-10-24
Payer: MEDICARE

## 2022-10-24 DIAGNOSIS — C50.112 MALIGNANT NEOPLASM OF CENTRAL PORTION OF LEFT BREAST IN FEMALE, ESTROGEN RECEPTOR POSITIVE: Primary | ICD-10-CM

## 2022-10-24 DIAGNOSIS — Z17.0 MALIGNANT NEOPLASM OF UPPER-OUTER QUADRANT OF LEFT BREAST IN FEMALE, ESTROGEN RECEPTOR POSITIVE: ICD-10-CM

## 2022-10-24 DIAGNOSIS — Z17.0 MALIGNANT NEOPLASM OF CENTRAL PORTION OF LEFT BREAST IN FEMALE, ESTROGEN RECEPTOR POSITIVE: Primary | ICD-10-CM

## 2022-10-24 DIAGNOSIS — C50.412 MALIGNANT NEOPLASM OF UPPER-OUTER QUADRANT OF LEFT BREAST IN FEMALE, ESTROGEN RECEPTOR POSITIVE: ICD-10-CM

## 2022-10-24 PROCEDURE — 99358 PR PROLONGED SERV,NO CONTACT,1ST HR: ICD-10-PCS | Mod: S$PBB,,, | Performed by: SURGERY

## 2022-10-24 PROCEDURE — 99358 PROLONG SERVICE W/O CONTACT: CPT | Mod: S$PBB,,, | Performed by: SURGERY

## 2022-10-24 RX ORDER — SODIUM CHLORIDE 9 MG/ML
INJECTION, SOLUTION INTRAVENOUS CONTINUOUS
Status: CANCELLED | OUTPATIENT
Start: 2022-10-24

## 2022-10-24 NOTE — PROGRESS NOTES
The patient was submitted for evaluation in multidisciplinary breast cancer conference ([facility]). Total time spent on preparation for [facility] was 35 minutes, which included review of the past medical history from the PCP, review of the mammogram and other imaging reports, care coordination with medical and radiation oncology, and documentation in the medical record.

## 2022-10-25 ENCOUNTER — OFFICE VISIT (OUTPATIENT)
Dept: HEMATOLOGY/ONCOLOGY | Facility: CLINIC | Age: 82
End: 2022-10-25
Payer: MEDICARE

## 2022-10-25 ENCOUNTER — HOSPITAL ENCOUNTER (OUTPATIENT)
Dept: CARDIOLOGY | Facility: HOSPITAL | Age: 82
Discharge: HOME | End: 2022-10-25
Attending: SURGERY
Payer: MEDICARE

## 2022-10-25 VITALS
BODY MASS INDEX: 26.45 KG/M2 | TEMPERATURE: 98 F | HEART RATE: 63 BPM | RESPIRATION RATE: 18 BRPM | WEIGHT: 143.75 LBS | OXYGEN SATURATION: 98 % | SYSTOLIC BLOOD PRESSURE: 146 MMHG | HEIGHT: 62 IN | DIASTOLIC BLOOD PRESSURE: 69 MMHG

## 2022-10-25 DIAGNOSIS — C50.112 MALIGNANT NEOPLASM OF CENTRAL PORTION OF LEFT BREAST IN FEMALE, ESTROGEN RECEPTOR POSITIVE: Primary | ICD-10-CM

## 2022-10-25 DIAGNOSIS — C50.112 MALIGNANT NEOPLASM OF CENTRAL PORTION OF LEFT BREAST IN FEMALE, ESTROGEN RECEPTOR POSITIVE: ICD-10-CM

## 2022-10-25 DIAGNOSIS — N18.32 STAGE 3B CHRONIC KIDNEY DISEASE: Chronic | ICD-10-CM

## 2022-10-25 DIAGNOSIS — H91.93 BILATERAL HEARING LOSS, UNSPECIFIED HEARING LOSS TYPE: ICD-10-CM

## 2022-10-25 DIAGNOSIS — F03.90 DEMENTIA WITHOUT BEHAVIORAL DISTURBANCE: Chronic | ICD-10-CM

## 2022-10-25 DIAGNOSIS — E78.1 HYPERTRIGLYCERIDEMIA: Chronic | ICD-10-CM

## 2022-10-25 DIAGNOSIS — Z17.0 MALIGNANT NEOPLASM OF CENTRAL PORTION OF LEFT BREAST IN FEMALE, ESTROGEN RECEPTOR POSITIVE: ICD-10-CM

## 2022-10-25 DIAGNOSIS — Z17.0 MALIGNANT NEOPLASM OF CENTRAL PORTION OF LEFT BREAST IN FEMALE, ESTROGEN RECEPTOR POSITIVE: Primary | ICD-10-CM

## 2022-10-25 PROBLEM — C50.412 MALIGNANT NEOPLASM OF UPPER-OUTER QUADRANT OF LEFT BREAST IN FEMALE, ESTROGEN RECEPTOR POSITIVE: Status: RESOLVED | Noted: 2022-10-13 | Resolved: 2022-10-25

## 2022-10-25 PROCEDURE — 93010 ELECTROCARDIOGRAM REPORT: CPT | Mod: ,,, | Performed by: INTERNAL MEDICINE

## 2022-10-25 PROCEDURE — 99205 PR OFFICE/OUTPT VISIT, NEW, LEVL V, 60-74 MIN: ICD-10-PCS | Mod: S$PBB,,, | Performed by: INTERNAL MEDICINE

## 2022-10-25 PROCEDURE — 99205 OFFICE O/P NEW HI 60 MIN: CPT | Mod: S$PBB,,, | Performed by: INTERNAL MEDICINE

## 2022-10-25 PROCEDURE — 99999 PR PBB SHADOW E&M-EST. PATIENT-LVL IV: CPT | Mod: PBBFAC,,, | Performed by: INTERNAL MEDICINE

## 2022-10-25 PROCEDURE — 93005 ELECTROCARDIOGRAM TRACING: CPT

## 2022-10-25 PROCEDURE — 99214 OFFICE O/P EST MOD 30 MIN: CPT | Mod: PBBFAC | Performed by: INTERNAL MEDICINE

## 2022-10-25 PROCEDURE — 99999 PR PBB SHADOW E&M-EST. PATIENT-LVL IV: ICD-10-PCS | Mod: PBBFAC,,, | Performed by: INTERNAL MEDICINE

## 2022-10-25 PROCEDURE — 93010 EKG 12-LEAD: ICD-10-PCS | Mod: ,,, | Performed by: INTERNAL MEDICINE

## 2022-10-25 NOTE — PROGRESS NOTES
Subjective:       Patient ID: Vanessa Vazquez is a 82 y.o. female.    Chief Complaint: Breast Cancer and Results    HPI:  82-year-old female history of dementia found to have multicentric breast cancer.  Patient is referred after multidisciplinary conference for review ECOG status 2 accompanied by     Past Medical History:   Diagnosis Date    CKD (chronic kidney disease), stage III     Dementia     Fibrocystic breast disease     Hearing loss     Hyperlipidemia     Osteoarthritis of thumb     Osteopenia      Family History   Problem Relation Age of Onset    Glaucoma Brother     Hyperlipidemia Mother     Heart disease Father     Osteoporosis Cousin     Coronary artery disease Maternal Uncle      Social History     Socioeconomic History    Marital status:    Tobacco Use    Smoking status: Never    Smokeless tobacco: Never   Substance and Sexual Activity    Alcohol use: No    Drug use: No    Sexual activity: Not Currently   Social History Narrative    The patient is  and the mother of 2 adult children. She retired from [employer] as .  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  She drives occasionally.     Social Determinants of Health     Financial Resource Strain: Low Risk     Difficulty of Paying Living Expenses: Not hard at all   Food Insecurity: No Food Insecurity    Worried About Running Out of Food in the Last Year: Never true    Ran Out of Food in the Last Year: Never true   Transportation Needs: Unknown    Lack of Transportation (Medical): No   Physical Activity: Sufficiently Active    Days of Exercise per Week: 7 days    Minutes of Exercise per Session: 40 min   Stress: No Stress Concern Present    Feeling of Stress : Not at all   Social Connections: Socially Integrated    Frequency of Communication with Friends and Family: More than three times a week    Frequency of Social Gatherings with Friends and  Family: More than three times a week    Attends Cheondo[employer] Services: More than 4 times per year    Active Member of Clubs or Organizations: No    Attends Club or Organization Meetings: More than 4 times per year    Marital Status:    Housing Stability: Unknown    Unable to Pay for Housing in the Last Year: No    Unstable Housing in the Last Year: No     Past Surgical History:   Procedure Laterality Date    BREAST BIOPSY Left 05/2018    Benign    CATARACT EXTRACTION EXTRACAPSULAR W/ INTRAOCULAR LENS IMPLANTATION         Labs:  Lab Results   Component Value Date    WBC 8.21 08/12/2022    HGB 13.7 08/12/2022    HCT 42.1 08/12/2022    MCV 90 08/12/2022     08/12/2022     BMP  Lab Results   Component Value Date     10/14/2022    K 4.1 10/14/2022     10/14/2022    CO2 21 (L) 10/14/2022    BUN 15 10/14/2022    CREATININE 1.1 10/14/2022    CALCIUM 9.6 10/14/2022    ANIONGAP 13 10/14/2022    ESTGFRAFRICA >60.0 09/03/2021    EGFRNONAA >60.0 09/03/2021     Lab Results   Component Value Date    ALT 21 10/14/2022    AST 18 10/14/2022    ALKPHOS 63 10/14/2022    BILITOT 0.5 10/14/2022       No results found for: IRON, TIBC, FERRITIN, SATURATEDIRO  Lab Results   Component Value Date    [id number] 474 07/24/2012     No results found for: FOLATE  Lab Results   Component Value Date    TSH 2.489 08/12/2022         Review of Systems   Constitutional:  Negative for activity change, appetite change, chills, diaphoresis, fatigue, fever and unexpected weight change.   HENT:  Negative for congestion, dental problem, drooling, ear discharge, ear pain, facial swelling, hearing loss, mouth sores, nosebleeds, postnasal drip, rhinorrhea, sinus pressure, sneezing, sore throat, tinnitus, trouble swallowing and voice change.    Eyes:  Negative for photophobia, pain, discharge, redness, itching and visual disturbance.   Respiratory:  Negative for cough, choking, chest tightness, shortness of breath, wheezing and stridor.     Cardiovascular:  Negative for chest pain, palpitations and leg swelling.   Gastrointestinal:  Negative for abdominal distention, abdominal pain, anal bleeding, blood in stool, constipation, diarrhea, nausea, rectal pain and vomiting.   Endocrine: Negative for cold intolerance, heat intolerance, polydipsia, polyphagia and polyuria.   Genitourinary:  Negative for decreased urine volume, difficulty urinating, dyspareunia, dysuria, enuresis, flank pain, frequency, genital sores, hematuria, menstrual problem, pelvic pain, urgency, vaginal bleeding, vaginal discharge and vaginal pain.   Musculoskeletal:  Negative for arthralgias, back pain, gait problem, joint swelling, myalgias, neck pain and neck stiffness.   Skin:  Negative for color change, pallor and rash.   Allergic/Immunologic: Negative for environmental allergies, food allergies and immunocompromised state.   Neurological:  Positive for weakness. Negative for dizziness, tremors, seizures, syncope, facial asymmetry, speech difficulty, light-headedness, numbness and headaches.   Hematological:  Negative for adenopathy. Does not bruise/bleed easily.   Psychiatric/Behavioral:  Positive for behavioral problems, confusion, decreased concentration and dysphoric mood. Negative for agitation, hallucinations, self-injury, sleep disturbance and suicidal ideas. The patient is nervous/anxious. The patient is not hyperactive.      Objective:      Physical Exam  Vitals reviewed.   Constitutional:       General: She is not in acute distress.     Appearance: She is well-developed. She is not diaphoretic.   HENT:      Head: Normocephalic and atraumatic.      Right Ear: External ear normal.      Left Ear: External ear normal.      Nose: Nose normal.      Right Sinus: No maxillary sinus tenderness or frontal sinus tenderness.      Left Sinus: No maxillary sinus tenderness or frontal sinus tenderness.      Mouth/Throat:      Pharynx: No oropharyngeal exudate.   Eyes:      General:  Lids are normal. No scleral icterus.        Right eye: No discharge.         Left eye: No discharge.      Conjunctiva/sclera: Conjunctivae normal.      Right eye: Right conjunctiva is not injected. No hemorrhage.     Left eye: Left conjunctiva is not injected. No hemorrhage.     Pupils: Pupils are equal, round, and reactive to light.   Neck:      Thyroid: No thyromegaly.      Vascular: No JVD.      Trachea: No tracheal deviation.   Cardiovascular:      Rate and Rhythm: Normal rate.   Pulmonary:      Effort: Pulmonary effort is normal. No respiratory distress.      Breath sounds: No stridor.   Chest:      Chest wall: No tenderness.   Abdominal:      General: Bowel sounds are normal. There is no distension.      Palpations: Abdomen is soft. There is no hepatomegaly, splenomegaly or mass.      Tenderness: There is no abdominal tenderness. There is no rebound.   Musculoskeletal:         General: No tenderness. Normal range of motion.      Cervical back: Normal range of motion and neck supple.   Lymphadenopathy:      Cervical: No cervical adenopathy.      Upper Body:      Right upper body: No supraclavicular adenopathy.      Left upper body: No supraclavicular adenopathy.   Skin:     General: Skin is dry.      Findings: No erythema or rash.   Neurological:      Mental Status: She is alert and oriented to person, place, and time.      Cranial Nerves: No cranial nerve deficit.      Coordination: Coordination normal.   Psychiatric:         Behavior: Behavior normal.         Thought Content: Thought content normal.         Judgment: Judgment normal.           Assessment:      1. Malignant neoplasm of central portion of left breast in female, estrogen receptor positive    2. Stage 3b chronic kidney disease    3. Dementia without behavioral disturbance    4. Bilateral hearing loss, unspecified hearing loss type    5. Hypertriglyceridemia           Plan:     Reviewed information reviewed multidisciplinary conference notes.   Review surgery notes.  At this point high likelihood need for mastectomy because of multi centric disease.  No clear evidence to suggest metastatic disease low Ki-67 ERPR positive tumor.  At this time would recommend that patient proceed with mastectomy will see patient after was high likely will proceed with Arimidex 1 mg p.o. q.day for 5 years will review final pathology to discuss.  In addition I have sent them article from up-to-date on evaluation of breast cancer in older women discussed implications of answered questions.        Zain Milton Jr, MD FACP

## 2022-10-27 DIAGNOSIS — C50.112 MALIGNANT NEOPLASM OF CENTRAL PORTION OF LEFT BREAST IN FEMALE, ESTROGEN RECEPTOR POSITIVE: Primary | ICD-10-CM

## 2022-10-27 DIAGNOSIS — Z17.0 MALIGNANT NEOPLASM OF CENTRAL PORTION OF LEFT BREAST IN FEMALE, ESTROGEN RECEPTOR POSITIVE: Primary | ICD-10-CM

## 2022-10-28 DIAGNOSIS — Z17.0 MALIGNANT NEOPLASM OF CENTRAL PORTION OF LEFT BREAST IN FEMALE, ESTROGEN RECEPTOR POSITIVE: Primary | ICD-10-CM

## 2022-10-28 DIAGNOSIS — C50.112 MALIGNANT NEOPLASM OF CENTRAL PORTION OF LEFT BREAST IN FEMALE, ESTROGEN RECEPTOR POSITIVE: Primary | ICD-10-CM

## 2022-10-31 DIAGNOSIS — Z17.0 MALIGNANT NEOPLASM OF CENTRAL PORTION OF LEFT BREAST IN FEMALE, ESTROGEN RECEPTOR POSITIVE: Primary | ICD-10-CM

## 2022-10-31 DIAGNOSIS — C50.112 MALIGNANT NEOPLASM OF CENTRAL PORTION OF LEFT BREAST IN FEMALE, ESTROGEN RECEPTOR POSITIVE: Primary | ICD-10-CM

## 2022-11-01 RX ORDER — PRAVASTATIN SODIUM 40 MG/1
TABLET ORAL
Qty: 90 TABLET | Refills: 3 | Status: SHIPPED | OUTPATIENT
Start: 2022-11-01 | End: 2023-10-25

## 2022-11-01 NOTE — TELEPHONE ENCOUNTER
No new care gaps identified.  [facility] Embedded Care Gaps. Reference number: [id number]. 10/31/2022   10:23:12 PM CDT

## 2022-11-01 NOTE — TELEPHONE ENCOUNTER
Refill Decision Note   Vanessa George  is requesting a refill authorization.  Brief Assessment and Rationale for Refill:  Approve     Medication Therapy Plan:       Medication Reconciliation Completed: No   Comments:     No Care Gaps recommended.     Note composed:4:42 PM 11/01/2022

## 2022-11-02 ENCOUNTER — CLINICAL SUPPORT (OUTPATIENT)
Dept: REHABILITATION | Facility: HOSPITAL | Age: 82
End: 2022-11-02
Attending: SURGERY
Payer: MEDICARE

## 2022-11-02 DIAGNOSIS — R26.89 DECREASED FUNCTIONAL MOBILITY: ICD-10-CM

## 2022-11-02 DIAGNOSIS — Z17.0 MALIGNANT NEOPLASM OF CENTRAL PORTION OF LEFT BREAST IN FEMALE, ESTROGEN RECEPTOR POSITIVE: ICD-10-CM

## 2022-11-02 DIAGNOSIS — C50.112 MALIGNANT NEOPLASM OF CENTRAL PORTION OF LEFT BREAST IN FEMALE, ESTROGEN RECEPTOR POSITIVE: ICD-10-CM

## 2022-11-02 PROCEDURE — 97161 PT EVAL LOW COMPLEX 20 MIN: CPT

## 2022-11-02 NOTE — PLAN OF CARE
[facility]  Physical Therapy Initial Evaluation    Date: 11/2/2022   Name: Vanessa Vazquez  Clinic Number: [id number]    Therapy Diagnosis:    Encounter Diagnoses   Name Primary?    Malignant neoplasm of central portion of left breast in female, estrogen receptor positive     Decreased functional mobility       Physician: Sabrina Leavitt MD     Physician Orders: PT Eval and Treat  Medical Diagnosis from Referral: Malignant neoplasm of central portion of left breast in female, estrogen receptor positive  Evaluation Date: 11/2/2022  Authorization Period Expiration: 10/13/2023  Plan of Care Expiration: 11/9/2022  Visit # / Visits authorized: 1/1  FOTO: 1/3    Precautions: Standard, Immunosuppression, cancer, and dementia and osteopenia    Time In: 1240  Time Out: 1315  Total Billable Time (timed & untimed codes): 30 minutes    SUBJECTIVE   History of Present Illness: Vanessa is a 82 y.o. female that presents to [facility] secondary to diagnosis of L breast cancer. States they found 3 lumps in the breast and that she is having a L mastectomy on 11/15/2022. Has no pain and no history of shoulder injury      Chemotherapy: TBD  Radiation: TBD    Pt presents today for baseline measurements to aid in the early detection of lymphedema, UE muscle testing, postural and ROM assessment along with education of risk of lymphedema and surgical precautions post surgery. Circumferential measurements will be taken today of BL UEs for early detection of lymphedema post surgery. Pt will also be instructed in exercises to perform pre and post-surgery to insure best outcomes.     Pain: NA    Prior Therapy: N/A  Nutrition:  Normal  Social History: Pt lives with their spouse  Occupation: Pt is retired  Exercise routine prior to onset : none  Current Level of Function: independent and pain free with all ADL, IADL, community mobility and functional activities.     Dominant Extremity:  right    Pts goals: establish goals and discuss post-operative treatment     Surgical History:  Vanessa Vazquez  has a past surgical history that includes Breast biopsy (Left, 05/2018) and Cataract extraction, extracapsular w/ intraocular lens implant.    Medications:  Vanessa has a current medication list which includes the following prescription(s): aspirin, donepezil, gatifloxacin, ketorolac 0.5%, moxifloxacin, pravastatin, prednisolone acetate, and vitamin d.    Allergies:  Review of patient's allergies indicates:  No Known Allergies     Other Past Medical History:   Past Medical History:   Diagnosis Date    CKD (chronic kidney disease), stage III     Dementia     Fibrocystic breast disease     Hearing loss     Hyperlipidemia     Osteoarthritis of thumb     Osteopenia                      OBJECTIVE   Mental status: Alert, oriented, attentive     Postural examination/scapula alignment: Rounded shoulder, Head forward, and Increased kyphosis    Joint integrity: WFLs  Skin integrity: intact  Edema: none noted    Sensation: Light Touch: Intact           Proprioception: Intact  Appearance: well groomed     ROM:   UPPER EXTREMITY--AROM/PROM  (R) UE: WNLs  (L) UE: WNLs     RANGE OF MOTION:    Cervical   Flexion 45  Extension 33  Side bending R 35  Side bending L 25  Rotation 75%    Shoulder AROM/PROM Right  11/2/2022 Left  11/2/2022 Pain/Dysfunction with Movement Goal   Flexion  135 135     Abduction  175 175     Extension 60 60     Functional IR Contralateral scapula Contralateral scapula     Functional ER T3 T3         STRENGTH:    U/E MMT Right Left Pain/Dysfunction with Movement Goal   Shoulder Flexion 4-/5 4-/5     Shoulder Extension (seated) 4-/5 4-/5     Shoulder Abduction 4-/5 4-/5     Shoulder IR (seated) 4-/5 4-/5     Shoulder ER (seated)   4-/5 4-/5     Elbow Flexion (seated) 4/5 4/5     Elbow Extension (seated) 4/5 4/5              Baseline Measurements of BL UE's for early detection of Lymphedema:  "    LANDMARK RIGHT UE  11/2/2022 LEFT UE  11/2/2022 DIFFERENCE  11/2/2022   Hand 18 cm 17.7 cm .3 cm   Wrist 14.3 cm 15 cm .7 cm   Wrist + 8" 17.2 cm 17.5 cm .3 cm   Wrist + 16" 22.5 cm 22 cm .5 cm   Elbow 21.5 cm 22.5 cm 1.0 cm   Elbow + 8" 26.2 cm 27.2 cm 1.0 cm       Coordination:   - fine motor: WFL  - UE coordination: intact     - LE coordination:  Not tested     Functional Mobility (Bed mobility, transfers)  Bed mobility: I =  independent   Roll to left: I  Roll to right: I  Supine to prone: I  Scooting to edge of bed: I  Supine to sit: I  Sit to supine: I  Transfers to bed: I  Transfers to toilet: I  Sit to stand:  I  Stand pivot:  I  Car transfers: I      ADL's:  Feeding: I = independent   Grooming: I  Hygiene: I  UB Dressing: I  LB Dressing: I  Toileting: I  Bathing: I    Gait Assessment:   - AD used: none  - Assistance: independent  - Distance: community distances       Endurance Deficit: none        FUNCTION:     CMS Impairment/Limitation/Restriction for FOTO NA Survey    Therapist reviewed FOTO scores for Vanessa on 11/2/2022.   FOTO documents entered into [employer] - see Media section.    Limitation Score: NA%         TREATMENT   Total Treatment time separate from Evaluation: (5) minutes      Home Exercises and Patient Education Provided    Education/Self-Care provided: (5) minutes  Pt educated on role of therapy in multi - disciplinary team, goals for therapy  Pt was educated in lymphedema etiology and management plans.    Pt was provided with written risk reductions and precautions for managing lymphedema.   Reviewed ANNIA drain care instructions.   ROM/lifting Precautions post surgery discussed -  until drains have been removed:  do not lift affected arm above 90 degrees of shoulder flexion  do not lift over 5 lbs  do not pull or push heavy objects  do not sleep on your stomach or surgery side   Patient was educated on all the above exercise prior/during/after for proper posture, positioning, and execution for " "safe performance with home exercise program.      Written Home Exercises Provided: yes.  Exercises were reviewed and Vanessa was able to demonstrate them prior to the end of the session.  Vanessa demonstrated good  understanding of the education provided.     See EMR under Patient Instructions for exercises provided 11/2/2022.      ASSESSMENT   This is a 82 y.o. female referred to outpatient physical therapy and presents with a medical diagnosis of L breast cancer and was seen today pre-operatively to assess strength and ROM of BL UEs, to take baseline circumferential measurements of BL UEs to aid in the early detection of lymphedema and provide pt education on exercises/precations post breast surgery. Pt does not exhibit any ROM impairments  Pt educated in lymphedema risks/precautions as well as ROM/lifting precautions post surgery - pt demonstrated/verbalized understanding. No goals established this visit as goals for PT will be established post surgery at follow up.      Plan of care discussed with patient: Yes  Pt's spiritual, cultural and educational needs considered and patient is agreeable to the plan of care and goals as stated below:     Anticipated Barriers for therapy: lack of understanding of condition and dementia    Medical Necessity is demonstrated by the following  History  Co-morbidities and personal factors that may impact the plan of care Co-morbidities:   advanced age, CKD stage III, and dementia and osteopenia    Personal Factors:   age     high   Examination  Body Structures and Functions, activity limitations and participation restrictions that may impact the plan of care Body Regions:   upper extremities    Body Systems:    none    Participation Restrictions:   See above in "Current Level of Function"     Activity limitations:   Learning and applying knowledge  no deficits    General Tasks and Commands  no deficits    Communication  no deficits    Mobility  no deficits    Self care  no " deficits    Domestic Life  no deficits    Interactions/Relationships  no deficits    Life Areas  no deficits    Community and Social Life  no deficits         low   Clinical Presentation stable and uncomplicated low   Decision Making/ Complexity Score: low       PLAN   Schedule patient for follow up with Physical therapy post surgery. Goals for therapy post surgery will be established at that time.     Priti Joseph, PT, DPT  11/7/2022

## 2022-11-04 ENCOUNTER — TELEPHONE (OUTPATIENT)
Dept: SURGERY | Facility: CLINIC | Age: 82
End: 2022-11-04
Payer: MEDICARE

## 2022-11-07 ENCOUNTER — OFFICE VISIT (OUTPATIENT)
Dept: SURGERY | Facility: CLINIC | Age: 82
End: 2022-11-07
Payer: MEDICARE

## 2022-11-07 DIAGNOSIS — F02.80 ALZHEIMER'S DEMENTIA, UNSPECIFIED DEMENTIA SEVERITY, UNSPECIFIED TIMING OF DEMENTIA ONSET, UNSPECIFIED WHETHER BEHAVIORAL, PSYCHOTIC, OR MOOD DISTURBANCE OR ANXIETY: ICD-10-CM

## 2022-11-07 DIAGNOSIS — G30.9 ALZHEIMER'S DEMENTIA, UNSPECIFIED DEMENTIA SEVERITY, UNSPECIFIED TIMING OF DEMENTIA ONSET, UNSPECIFIED WHETHER BEHAVIORAL, PSYCHOTIC, OR MOOD DISTURBANCE OR ANXIETY: ICD-10-CM

## 2022-11-07 DIAGNOSIS — Z17.0 MALIGNANT NEOPLASM OF CENTRAL PORTION OF LEFT BREAST IN FEMALE, ESTROGEN RECEPTOR POSITIVE: Primary | ICD-10-CM

## 2022-11-07 DIAGNOSIS — C50.112 MALIGNANT NEOPLASM OF CENTRAL PORTION OF LEFT BREAST IN FEMALE, ESTROGEN RECEPTOR POSITIVE: Primary | ICD-10-CM

## 2022-11-07 PROBLEM — R26.89 DECREASED FUNCTIONAL MOBILITY: Status: ACTIVE | Noted: 2022-11-07

## 2022-11-07 PROCEDURE — 99214 OFFICE O/P EST MOD 30 MIN: CPT | Mod: S$PBB,,, | Performed by: SURGERY

## 2022-11-07 PROCEDURE — 99211 OFF/OP EST MAY X REQ PHY/QHP: CPT | Mod: PBBFAC | Performed by: SURGERY

## 2022-11-07 PROCEDURE — 99214 PR OFFICE/OUTPT VISIT, EST, LEVL IV, 30-39 MIN: ICD-10-PCS | Mod: S$PBB,,, | Performed by: SURGERY

## 2022-11-07 PROCEDURE — 99999 PR PBB SHADOW E&M-EST. PATIENT-LVL I: ICD-10-PCS | Mod: PBBFAC,,, | Performed by: SURGERY

## 2022-11-07 PROCEDURE — 99999 PR PBB SHADOW E&M-EST. PATIENT-LVL I: CPT | Mod: PBBFAC,,, | Performed by: SURGERY

## 2022-11-07 NOTE — PATIENT INSTRUCTIONS
POSTOPERATIVE INSTRUCTIONS FOLLOWING   MASTECTOMY AND/OR AXILLARY LYMPH NODE DISSECTION    The following are post-operative instructions that will help you to recover from your surgery.  Please read over these instructions carefully and contact us if we can answer any of your questions or concerns.    Dressing/breast binder (surgi-bra)  A surgical bra may be placed around your chest after your surgery.  If you are given the bra, please wear it as close to 24 hours a day as possible until your post-operative clinic appointment.  If the elastic around the bra irritates your skin, you may wear a soft t-shirt underneath the bra.    You may go without wearing the bra long enough to bath, to launder and dry the bra. If you have fluffy filler placed inside the bra, the filler should be removed whenever the bra is taken off. Please reinsert the fluffy filler, or insert the new soft filler, under the bra when you put the bra back on.  If the bra is extremely uncomfortable, you may wear a supportive sports bra instead after 2 days.    You may shower AFTER the drains are removed.  Please sponge bathe until then. Do not take a tub bath and do not soak the surgical site. Please do not remove the white strips of tape (steri-strips) that cover your incision.  They will be removed at your clinic visit.    Activity   You will be able to do much of your own personal care, such as bathing, dressing, preparing simple meals, etc.  A short walk each day will help with your recovery  You may find that you need to take rest breaks between activities, but you should not need to stay in bed for prolonged periods of time during the day  You may resume light household activities such as simple meal preparation, folding laundry, using your computer, and completing paperwork as you feel ready  Please avoid activities that require moderate to heavy lifting (grocery shopping) or pushing/pulling (vacuuming) and repetitive motions (such as washing  windows or long hours on the computer). Do not lift anything heavier than a gallon of milk.  A good rule during this time is to listen to your body, do what is comfortable, and stop and rest when your feel tired.  If it hurts, dont do it.  Following a lymph node dissection, dont avoid using your arm, but dont exercise your arm until after your first post-operative visit.  At your first post-op visit, you will be given arm exercises to regain movement and flexibility.  You may be referred to physical therapy.  You may restart driving when you are no longer on narcotics, your drain has been removed, and you feel safe turning the wheel and stopping quickly.  You will need to be out of work approximately 2-6 weeks depending on your particular surgery and how well you are recovering.  We will evaluate how you are doing at the first post-op appointment.  This is a good time to ask when you may return to work and what activities you may do.    Medication for pain  You will be given a prescription for pain medication. You should not drive or operate machinery while taking these.  Please take narcotics with food.  Narcotics can cause, or worse, constipation.  You will need to increase your fluid intake, eat high fiber foods (such as fruits and bran) and make sure that you are up and walking. You may need to take an over the counter stool softener for constipation.  An icepack may be helpful to decrease discomfort and swelling, particularly to the armpit after a lymph node dissection.  A small pillow positioned in the armpit may also decrease discomfort after a lymph node dissection.  If you are given a prescription for antibiotics, please continue to take them until the drains have been removed.    How to care for your Drain(s)  Wash hands--STRIP or milk the drainage tube as it comes out of your body toward the bulb.   Beginning where the drain comes out of your body, hold drainage tubing with one hand and with the  other, stretch and release tubing an inch at time while moving downward with both hands toward the bulb.  Do this 2-3 times before emptying the bulb.  Remove the stopper from the bulbs port  (drainage port)  Pour the drainage in the measuring cup provided by the nurse  Flatten/squeeze the bulb to create a vacuum and replace the stopper before letting go the of the bulb.  Record the date, time and amount of drainage in ccs (not ounces) each time bulb is emptied. If you have more than one drain, record each separately.  Discard the drainage into the toilet after measuring and then wash hands.  Empty bulbs 3 times/day or as needed if it fills up before 8 hours.  Remember to bring the output record with you to your doctors appointment.    Please report the following:  Temperature greater than 101 degrees  Discharge or bad odor from the wound  Excessive bleeding, such as bloody dressing or extreme bruising  Redness at incision and/or drain sites  Swelling or buildup of fluid around incision  If blue dye was used to locate your sentinel lymph nodes, your urine and stool may be blue-green in color for 1 or 2 days.    Additional information  I will see you approximately 2 weeks following your surgery.  If this follow-up appointment has not been made, please call the office.    If you have any questions or problems, please call my office.    Dr. Sabrina Leavitt    [phone]    After hours, please contact the [facility]  at [phone].

## 2022-11-07 NOTE — PROGRESS NOTES
Breast Surgical Oncology  [place]    Date of Service: 2022    SUBJECTIVE:   Chief complaint: breast cancer    HISTORY OF PRESENT ILLNESS:   Vanessa Vazquez is a 82 y.o. female who is kindly referred by Dr. Magui Jc for left breast cancer.    10/13/2022  The patient has newly diagnosed left invasive duct cell carcinoma by way of abnormal screening mammogram. She was seen today along with her gonzález . She has a diagnosis of dementia but is overall healthy and functions well. She works at an [employer]. She denies breast concerns such as pain, masses, skin changes, nipple discharge, nipple retraction or lumps under the arm. She had an abnormal mammogram and ultrasound in 2018 for which she underwent US core needle biopsy which was benign. Her recent mammogram detected an asymmetry and diagnostic ultrasound identified a central mass which was biopsy positive cancer. On post biopsy diagnostic mammography, the biopsy clip did not correlate with the mammographic asymmetry. She then underwent a stereotactic biopsy of the UOQ finding which was also biopsy positive cancer.    10/17/2022  Patient here to discuss recent breast MRI results. Presented with another focus with recommendation for biopsy, however would require MRI biopsy. She has multicentric disease based on ultrasound biopsied masses. She has a third suspicious mass (deep central, 5 cm posterior to the retro areolar biopsy positive IDC) detected on the MRI which has not been biopsied. These masses span a large volume of the breast making breast conservation not possible. She and her  understand that she is no longer a candidate for lumpectomy.     2022  They are here today for preoperative visit at the request of the family. No new issues or concerns regarding the breast.     Her breast cancer risk factor profile is as follows: Menarche at 14, Menopause at 50.  She is . Age at first live birth was 25. Family  history of cancer is as follows: mother with colon cancer at age 101- cause of death at age 101.    FAMILY HISTORY:     Family History   Problem Relation Age of Onset    Glaucoma Brother     Hyperlipidemia Mother     Heart disease Father     Osteoporosis Cousin     Coronary artery disease Maternal Uncle         PAST MEDICAL HISTORY:     Past Medical History:   Diagnosis Date    CKD (chronic kidney disease), stage III     Dementia     Fibrocystic breast disease     Hearing loss     Hyperlipidemia     Osteoarthritis of thumb     Osteopenia        SURGICAL HISTORY:     Past Surgical History:   Procedure Laterality Date    BREAST BIOPSY Left 05/2018    Benign    CATARACT EXTRACTION EXTRACAPSULAR W/ INTRAOCULAR LENS IMPLANTATION         SOCIAL HISTORY:     Social History     Tobacco Use    Smoking status: Never    Smokeless tobacco: Never   Substance Use Topics    Alcohol use: No    Drug use: No        MEDICATIONS/ALLERGIES:     Current Outpatient Medications:     aspirin (ECOTRIN) 81 MG EC tablet, Take 81 mg by mouth once daily., Disp: , Rfl:     donepeziL (ARICEPT) 10 MG tablet, TAKE ONE TABLET BY MOUTH ONE TIME DAILY, Disp: 30 tablet, Rfl: 11    gatifloxacin 0.5 % Drop drops, Apply 1 drop to eye 2 (two) times daily. Eyedrops to start one day before surgery (Patient not taking: Reported on 10/25/2022), Disp: 5 mL, Rfl: 2    ketorolac 0.5% (ACULAR) 0.5 % Drop, Place 1 drop into the left eye 4 (four) times daily. Eyedrops to start one day before surgery (Patient not taking: Reported on 10/25/2022), Disp: 5 mL, Rfl: 1    moxifloxacin (VIGAMOX) 0.5 % ophthalmic solution, Place 1 drop into the right eye every 12 (twelve) hours. Start eyedrops one day before surgery (Patient not taking: Reported on 10/25/2022), Disp: 5 mL, Rfl: 2    pravastatin (PRAVACHOL) 40 MG tablet, TAKE ONE TABLET BY MOUTH ONE TIME DAILY, Disp: 90 tablet, Rfl: 3    prednisoLONE acetate (PRED FORTE) 1 % DrpS, Place 1 drop into the left eye 4 (four) times  daily. Eyedrops to start one day before surgery (Patient not taking: Reported on 10/25/2022), Disp: 5 mL, Rfl: 1    vitamin D (VITAMIN D3) 1000 units Tab, Take 1,000 Units by mouth once daily., Disp: , Rfl:   Review of patient's allergies indicates:  No Known Allergies    REVIEW OF SYSTEMS:   I have reviewed 12 systems, including 2 points per system. Pertinent reported positives are: frequent urination, on blood thinners, and hearing loss.    PHYSICAL EXAM:   General: The patient appears well and is in no acute distress.     BREAST EXAM  No Asymmetry  Right:  - Mass: No  - Skin change: No  - Nipple Discharge: No  - Nipple retraction: No  - Axillary LAD: No  Left:   - Mass: No  - Skin change: No  - Nipple Discharge: No  - Nipple retraction: No  - Axillary LAD: No    IMAGING:   Images were personally reviewed.     Results for orders placed during the hospital encounter of 10/14/22    MRI Breast w/wo Contrast, w/CAD, Bilateral    Narrative  Result:  MRI Breast w/wo Contrast, w/CAD, Bilateral    History:  Patient is 82 y.o. and is seen for malignant neoplasm of central portion of left breast in female, estrogen receptor positive.    Films Compared:  Compared to: 10/04/2022 US Breast Biopsy with Imaging 1st site Left, 10/04/2022 Mammo Digital Diagnostic Left, 10/04/2022 Mammo Breast Stereotactic Biopsy Left, 09/22/2022 US Breast Left Limited, 09/22/2022 Mammo Digital Diagnostic Left with Enrique, 09/14/2022 Mammo Digital Screening Bilat w/ Enrique, 09/03/2021 Mammo Digital Screening Bilat w/ Enrique, 12/11/2019 Mammo Digital Screening Bilat w/ Enrique, 11/21/2018 Mammo Digital Diagnostic Left w/ Enrique, 11/21/2018 US Breast Left Limited, 05/17/2018 US Breast Biopsy with Imaging 1st site L, 05/17/2018 Mammo Digital Diagnostic Post Procedure_Mammo Guide_Clip_Needle Loc only, 05/09/2018 Mammo Digital Diagnostic Left with Tomosynthesis_CAD, 05/09/2018 US Breast Left Limited, 04/27/2018 Mammo Digital Screening Bilat with Tomosynthesis_CAD,  03/15/2017 Mammo Digital Screening Bilat with Tomosynthesis_CAD, 03/10/2016 Mammo Digital Screening Bilat with Tomosynthesis_CAD, 02/19/2015 Mammo Digital Screening Bilat with Tomosynthesis_CAD, and 11/18/2013 Mammo Digital Screening Bilat with CAD    Technique:  A routine breast MRI was performed with a dedicated breast coil. Pre-contrast STIR were acquired. Then, pre and post contrast T1 weighted fat saturated images were acquired and subtracted with MIP reconstruction. 15 ml of intravenous gadolinium contrast was administered. The study was reviewed with Selphee software.    Findings:  Left  There is a non-mass enhancement seen in the retroareolar region of the left breast with associated post-biopsy changes.  This area correlates to a biopsy proven malignancy (recently  Performed via ultrasound).  Clip marker in place. This area measured up to 17 mm on the recent sonogram.    There is a non-mass enhancement seen in the upper inner quadrant of the left breast at 10 o'clock.   This area also correlates to a biopsy proven malignancy with area measuring approximately 22 mm .    The more posteriorly biopsied lesion is located 5.2 cm posteromedial to the retroareolar lesion on cc view on the recent mammogram  and just over 5 cm posterosuperior to it as measured on ml view.  MRI demonstrates non-mass enhancement between these lesions as well, concerning for disease involvement.    There is a non-mass enhancement seen in the central region of the left breast with a few of these areas within this region appearing more linear. See image 70-72 sub 1 sequence.  This area is located 6 cm from the nipple and extends for 3 cm posteriorly.  There are associated type I kinetics.  If breast conservation is desired or if it would affect management, would also recommend biopsy via MRI of this area.    Right  There is no evidence of suspicious masses, abnormal enhancement, or other abnormal findings in the right  breast.    Impression  Left  Non-mass Enhancement: Left breast non-mass enhancement at the retroareolar position. Assessment: 6 - Known biopsy, proven malignancy.  Non-mass Enhancement: Left breast non-mass enhancement at the upper inner 10 o'clock position. Assessment: 6 - Known biopsy, proven malignancy.  Non-mass Enhancement: Left breast non-mass enhancement at the central position. Assessment: 4 - Suspicious finding. Biopsy is recommended.    Right  There is no MR evidence of malignancy in the right breast.    BI-RADS Category:  Overall: 4 - Suspicious    Recommendation:  MRI Biopsy can be performed for non-mass enhancement central left breast if it would affect management as above.    Surgical consult for left breast recommended.      Results for orders placed during the hospital encounter of 09/22/22    Mammo Digital Diagnostic Left with Enrique    Narrative  Result:  Mammo Digital Diagnostic Left with Enrique  US Breast Left Limited    History:  Patient is 82 y.o. and is seen for diagnostic imaging.    Films Compared:  Prior images (if available) were compared.    Findings:  This procedure was performed using tomosynthesis. Computer-aided detection was utilized in the interpretation of this examination.  Left  Mammo Digital Diagnostic Left with Enrique  The left breast has scattered areas of fibroglandular density. Asymmetry/mass with architectural distortion persists within the upper inner breast.    Ultrasound was performed and demonstrates an irregular hypoechoic mass measuring 1.7 x 1.2 cm at the 10 o'clock position. No concerning axillary lymph nodes.    Impression  Suspicious mass at the 10 o'clock position.      BI-RADS Category:  Overall: 4 - Suspicious      Recommendation:  Ultrasound Biopsy is recommended.      Your estimated lifetime risk of breast cancer (to age 85) based on Tyrer-Cuzick risk assessment model is Tyrer-Cuzick: 0.37 %. According to the American Cancer Society, patients with a lifetime breast  cancer risk of 20% or higher might benefit from supplemental screening tests.      Results for orders placed during the hospital encounter of 11/21/18    Mammo Digital Diagnostic Left w/ Enrique    Narrative  Result:  Mammo Digital Diagnostic Left w/ Enrique  US Breast Left Limited    History:  Patient is 78 y.o. and is seen for a diagnostic mammogram.    Films Compared:  Prior images (if available) were compared.    Findings:  This procedure was performed using tomosynthesis.  Computer-aided detection was utilized in the interpretation of this examination.  Mammo Digital Diagnostic Left w/ Enrique  The breast has scattered areas of fibroglandular density. There is no evidence of suspicious masses, calcifications, or other abnormal findings.    Post biopsy clip noted within the 3:00 position of the left breast. No mammographic abnormality appreciated. Previously discussed 11 mm mass at the lower inner anterior breast is not definitely visualized. There is a stable benign oil cysts noted.        US Breast Left Limited    Ultrasound of the 9 o'clock position demonstrates decrease in size of the previously suspected mildly complex cyst. This may in fact represent focal area of ductal dilatation. There are mildly prominent adjacent ducts which are debris-filled without concerning intraductal mass    Impression  There is no mammographic or sonographic evidence of malignancy.    BI-RADS Category:  Left: 2 - Benign  Overall: 2 - Benign    Recommendation:  Patient is due for routine screening mammogram May 2019.      The patient's estimated lifetime risk of breast cancer (to age 85) based on Tyrer-Cuzick - 7 risk assessment model is: Tyrer-Cuzick: 2.17 %. According to the American Cancer Society,  patients with a lifetime breast cancer risk of 20% or higher might benefit from supplemental screening tests.      Results for orders placed during the hospital encounter of 05/09/18    Mammo Digital Diagnostic Left with  Tomosynthesis_CAD    Narrative  Result:  Mammo Digital Diagnostic Left with Tomosynthesis_CAD  US Breast Left Limited    History:  Patient is 77 y.o. and is seen for a diagnostic mammogram.    Films Compared:  04/27/2018 Mammo Digital Screening Bilat with Tomosynthesis_CAD, and 03/15/2017 Mammo Digital Screening Bilat with Tomosynthesis_CAD    Findings:  This procedure was performed using tomosynthesis.  Computer-aided detection was utilized in the interpretation of this examination.  Mammo Digital Diagnostic Left with Tomosynthesis_CAD  The breast has scattered areas of fibroglandular density.    There is an 8 mm round mass with obscured margins seen in the left breast at 3 o'clock on the ML view.    There is an 11 mm round mass with circumscribed margins seen in the lower inner quadrant of the left breast in the anterior depth.    US Breast Left Limited  There is a 5 mm x 4 mm x 5 mm hypoechoic mass with shadowing seen in the left breast at 3 o'clock.    There is a 16 mm x 7 mm x 11 mm complicated cyst seen in the lower inner quadrant of the left breast.    Impression  Left  Cyst: Left breast 16 mm x 7 mm x 11 mm cyst at the lower inner position. Assessment: 3 - Probably benign. Short Interval Follow-Up in 6 Months is recommended.  Mass: Left breast 8 mm mass at the 3 o'clock position. Assessment: 4 - Suspicious finding. Ultrasound-guided biopsy is recommended.    BI-RADS Category:  Left: 4 - Suspicious  Overall: 4 - Suspicious    Recommendation:  Short interval follow-up is recommended in 6 Months.  Ultrasound-guided biopsy is recommended.    The patient's estimated lifetime risk of breast cancer (to age 85) based on Tyrer-Cuzick - 7 risk assessment model is: Tyrer-Cuzick: 2.72 %. According to the American Cancer Society,  patients with a lifetime breast cancer risk of 20% or higher might benefit from supplemental screening tests.      PATHOLOGY:     Lab Results   Component Value Date    FPATHDX  10/04/2022      1. Left breast (10 o'clock position), biopsy:      -  Invasive mammary carcinoma with lobular features      -  Grade 1 of 3 (tubule formation:  3, pleomorphism:  1, mitotic rate:  1)      -  Single focus measuring less than 1 mm in greatest linear dimension      -  Microcalcifications:  Not identified      -  Ductal carcinoma in Situ (DCIS):  Not identified  Comment:  Immunohistochemical stains and breast biomarkers were attempted.  Unfortunately, the focus of tumor was scant the tissue was exhausted prior to  staining.  Stains were therefore noncontributory and the billing will be  adjusted accordingly.  2. Left breast, biopsy:      -  Invasive ductal carcinoma with lobular features      -  Grade 1 of 3 (tubule formation: 3, pleomorphism: 1, mitotic rate: 1)      -  At least 0.8 cm in greatest linear dimension      -  Microcalcifications:  Not identified      -  Ductal carcinoma in situ (DCIS): Not identified      -  Breast biomarkers:           ER:   Positive (strong, greater than 95%)           AR:   Positive (strong, greater than 95%)           Her2:  Negative (0)           Ki67:  15%  Comment: An immunohistochemical stain for E-cadherin shows tumor cell  positivity, confirming ductal origin           ASSESSMENT:     1. Malignant neoplasm of central portion of left breast in female, estrogen receptor positive          PLAN:     Vanessa Vazquez is a 82 y.o. female who is new diagnosis of multicentric Stage IA (T1c N0 Mx) LEFT Breast Invasive Ductal  Carcinoma, Grade 1, ER >95%, AR >95%, Exh0mci 0 with a ki67 of 15%. I have reviewed her imaging and pathology reports with her and I have provided her with copies.    She has multicentric disease based on ultrasound biopsied masses. She has a third suspicious mass (deep central, 5 cm posterior to the retro areolar biopsy positive IDC) detected on the MRI which has not been biopsied. These masses span a large volume of the breast making breast conservation not  possible. She and her  understand that she is no longer a candidate for lumpectomy.     I reviewed that in the setting of a mastectomy, radiation will depend on final pathology. We discussed post mastectomy reconstruction at length. They are not interested in reconstruction at this time.     We reviewed the need for sentinel lymph node biopsy to further stage the axilla.       Because her cancer is hormone receptor positive, she will be recommended for endocrine therapy.  The decision for or against adjuvant chemotherapy will be made following surgery. She understands that she will be referred to a medical oncologist to discuss these points further.    She is scheduled for left total mastectomy with sentinel lymph node biopsy on 11/15. The risks of surgery were discussed with the patient, including pain, bleeding, infections, scarring, cosmetic deformity, numbness, lymphedema, injury to adjacent structures such as nerves that affect movement of the arm, need for additional surgery for margins or lymph nodes, need for additional treatments and recurrence.  She understands that if sentinel lymph node biopsy shows metastasis, a completion axillary dissection may be done.  She has provided informed consent.     I spent a total of 30 minutes on this visit. This includes face to face time and non-face to face time preparing to see the patient (eg, review of tests), obtaining and/or reviewing separately obtained history, documenting clinical information in the electronic or other health record, independently interpreting results and communicating results to the patient/family/caregiver, or care coordinator.        Sabrina Leavitt M.D.

## 2022-11-08 ENCOUNTER — TELEPHONE (OUTPATIENT)
Dept: HEMATOLOGY/ONCOLOGY | Facility: CLINIC | Age: 82
End: 2022-11-08
Payer: MEDICARE

## 2022-11-08 ENCOUNTER — SOCIAL WORK (OUTPATIENT)
Dept: HEMATOLOGY/ONCOLOGY | Facility: CLINIC | Age: 82
End: 2022-11-08
Payer: MEDICARE

## 2022-11-08 DIAGNOSIS — Z17.0 MALIGNANT NEOPLASM OF CENTRAL PORTION OF LEFT BREAST IN FEMALE, ESTROGEN RECEPTOR POSITIVE: Primary | ICD-10-CM

## 2022-11-08 DIAGNOSIS — C50.112 MALIGNANT NEOPLASM OF CENTRAL PORTION OF LEFT BREAST IN FEMALE, ESTROGEN RECEPTOR POSITIVE: Primary | ICD-10-CM

## 2022-11-08 DIAGNOSIS — F03.90 DEMENTIA WITHOUT BEHAVIORAL DISTURBANCE: Chronic | ICD-10-CM

## 2022-11-08 NOTE — PROGRESS NOTES
SW consulted to assist pt with Home Health Care. WESLEY will place orders for MD signature. Jennifer Molina LMSW

## 2022-11-08 NOTE — TELEPHONE ENCOUNTER
SW faxed HH referral. SW will follow up tomorrow to confirm that HH received.   SW will remain available. Jennifer Molina LMSW

## 2022-11-08 NOTE — TELEPHONE ENCOUNTER
SW attempted to reach pt at [phone] and [phone] to discuss HH. No vm option. SW will remain available. Jennifer Molina LMSW

## 2022-11-09 ENCOUNTER — HOSPITAL ENCOUNTER (OUTPATIENT)
Dept: PREADMISSION TESTING | Facility: HOSPITAL | Age: 82
Discharge: HOME | End: 2022-11-09
Attending: SURGERY
Payer: MEDICARE

## 2022-11-09 ENCOUNTER — TELEPHONE (OUTPATIENT)
Dept: HEMATOLOGY/ONCOLOGY | Facility: CLINIC | Age: 82
End: 2022-11-09
Payer: MEDICARE

## 2022-11-09 VITALS
OXYGEN SATURATION: 97 % | TEMPERATURE: 98 F | RESPIRATION RATE: 15 BRPM | SYSTOLIC BLOOD PRESSURE: 164 MMHG | DIASTOLIC BLOOD PRESSURE: 67 MMHG | HEART RATE: 70 BPM

## 2022-11-09 DIAGNOSIS — C50.112 MALIGNANT NEOPLASM OF CENTRAL PORTION OF LEFT BREAST IN FEMALE, ESTROGEN RECEPTOR POSITIVE: ICD-10-CM

## 2022-11-09 DIAGNOSIS — N18.32 STAGE 3B CHRONIC KIDNEY DISEASE: Chronic | ICD-10-CM

## 2022-11-09 DIAGNOSIS — Z01.818 PREOP EXAMINATION: Primary | ICD-10-CM

## 2022-11-09 DIAGNOSIS — F03.90 DEMENTIA WITHOUT BEHAVIORAL DISTURBANCE: Chronic | ICD-10-CM

## 2022-11-09 DIAGNOSIS — E78.1 HYPERTRIGLYCERIDEMIA: Chronic | ICD-10-CM

## 2022-11-09 DIAGNOSIS — Z86.73 HISTORY OF CVA (CEREBROVASCULAR ACCIDENT): ICD-10-CM

## 2022-11-09 DIAGNOSIS — Z17.0 MALIGNANT NEOPLASM OF CENTRAL PORTION OF LEFT BREAST IN FEMALE, ESTROGEN RECEPTOR POSITIVE: ICD-10-CM

## 2022-11-09 DIAGNOSIS — R03.0 ELEVATED BP WITHOUT DIAGNOSIS OF HYPERTENSION: ICD-10-CM

## 2022-11-09 LAB
BASOPHILS # BLD AUTO: 0.11 K/UL (ref 0–0.2)
BASOPHILS NFR BLD: 1.4 % (ref 0–1.9)
DIFFERENTIAL METHOD: NORMAL
EOSINOPHIL # BLD AUTO: 0.4 K/UL (ref 0–0.5)
EOSINOPHIL NFR BLD: 4.8 % (ref 0–8)
ERYTHROCYTE [DISTWIDTH] IN BLOOD BY AUTOMATED COUNT: 13.8 % (ref 11.5–14.5)
HCT VFR BLD AUTO: 38 % (ref 37–48.5)
HGB BLD-MCNC: 13 G/DL (ref 12–16)
IMM GRANULOCYTES # BLD AUTO: 0.03 K/UL (ref 0–0.04)
IMM GRANULOCYTES NFR BLD AUTO: 0.4 % (ref 0–0.5)
LYMPHOCYTES # BLD AUTO: 2.4 K/UL (ref 1–4.8)
LYMPHOCYTES NFR BLD: 30.5 % (ref 18–48)
MCH RBC QN AUTO: 30.2 PG (ref 27–31)
MCHC RBC AUTO-ENTMCNC: 34.2 G/DL (ref 32–36)
MCV RBC AUTO: 88 FL (ref 82–98)
MONOCYTES # BLD AUTO: 0.5 K/UL (ref 0.3–1)
MONOCYTES NFR BLD: 7 % (ref 4–15)
NEUTROPHILS # BLD AUTO: 4.3 K/UL (ref 1.8–7.7)
NEUTROPHILS NFR BLD: 55.9 % (ref 38–73)
NRBC BLD-RTO: 0 /100 WBC
PLATELET # BLD AUTO: 275 K/UL (ref 150–450)
PMV BLD AUTO: 9.8 FL (ref 9.2–12.9)
RBC # BLD AUTO: 4.31 M/UL (ref 4–5.4)
WBC # BLD AUTO: 7.7 K/UL (ref 3.9–12.7)

## 2022-11-09 PROCEDURE — 85025 COMPLETE CBC W/AUTO DIFF WBC: CPT | Performed by: NURSE PRACTITIONER

## 2022-11-09 NOTE — ASSESSMENT & PLAN NOTE
- BP mildly elevated today.  - Not currently on any home medications.  - Continue outpatient f/u with PCP for monitoring.

## 2022-11-09 NOTE — TELEPHONE ENCOUNTER
WESLEY spoke with Kristi at [facility]. Kristi confirmed that  referral has been received, and is pending. WESLEY will remain available. Jennifer Molina LMSW

## 2022-11-09 NOTE — ASSESSMENT & PLAN NOTE
- Oriented to person and place, answers questions appropriately, short term memory loss apparent.  - Continue Aricept and supportive care.
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
## 2022-11-09 NOTE — DISCHARGE INSTRUCTIONS
To confirm, your doctor has instructed you that surgery is scheduled for 11/15/22.       Pre admit office will call the afternoon prior to surgery between 1PM and 3PM with arrival time.    Surgery will be at [facility],  The address is [address]. [place]  [id number].      IMPORTANT INSTRUCTIONS!    Do not eat or drink after 12 midnight, including water.   Do not smoke or use chewing tobacco after 12 midnight  OK to brush teeth, but no gum, candy, or mints!      Take only these medicines with a small swallow of water-morning of surgery.     None         ____ Stop Aspirin, Ibuprofen, Motrin and Aleve at least 5-7 days before surgery, unless otherwise instructed by your doctor, or the nurse.   You MAY use Tylenol/acetaminophen until day of surgery.      ____  If you take diabetic medication, do NOT take morning of surgery unless instructed by Doctor. Metformin must be stopped 24 hrs prior to surgery time.       ____ Stop taking any Fish Oil supplements or Vitamins at least 5 days prior to surgery, unless instructed otherwise by your Doctor.       Bathing Instructions: The night before surgery and the morning prior to coming to the hospital:    - Shower & rinse your body as usual with anti-bacterial Soap (Dial or Lever 2000)   -Hibiclens (if indicated) use AFTER anti-bacterial soap; 1 packet PM/1 packet in AM on surgical site only   -Do not use hibiclens on your head, face, or genitals.    -Do not wash with anti-bacterial soap after you use the hibiclens.    -Do not shave surgical site 5-7 days prior to surgery.    -Pubic hair 7 days prior to surgery (gyn pt's).      Pediatric patients do not need to use anti-bacterial soap or Hibiclens.             After Bathing:   __ No powder, lotions, creams, or body spray to skin     __No deodorant for any breast procedure, PORT, or upper arm surgery     __ No makeup, mascara, nail polish or artificial nails        **SURGERY WILL BE CANCELLED IF  ARTIFICIAL/NAIL POLISH IS PRESENT!!!**    __ Please remove all piercings and leave all jewelry at home.    **SURGERY WILL BE CANCELLED IF PIERCINGS ARE PRESENT!!!**      __ Dentures, Hearing Aids and Contact Lens need to be removed prior to the start of surgery.      __ Wear clean, loose-fitting clothing. Allow for dressings/bandages/surgical equipment     __ You must have transportation, and they MUST stay the entire time.       [facility] Visitor/Ride Policy:   Only 1 adult allowed (over the age of 18) to accompany you into Pre-op/Recovery Surgery Dept and must stay through the entire length of admission.     Must have a ride home from a responsible adult that you know and trust.      Pediatric Patients are allowed 2 adult visitors.     Medical Transport, Uber or Lyft can only be used if patient has a responsible adult to accompany them during ride home.         Post-Op Instructions: You will receive surgery post-op instructions by your Discharge Nurse prior to going home.     Surgical Site Infection:   Prevention of surgical site infections:   -Keep incisions clean and dry.   -Do not soak/submerge incisions in water until completely healed.   -Do not apply lotions, powders, creams, or deodorants to site.   -Always make sure hands are cleaned with antibacterial soap/ alcohol-based   prior to touching the surgical site.       Signs and symptoms:               -Redness and pain around the area where you had surgery               -Drainage of cloudy fluid from your surgical wound               -Fever over 100.4 or chills     >>>Call Surgeon office/on-call Surgeon if you experience any of these signs & symptoms post-surgery.        *Please Call [facility] Pre-Admissions Department with surgery instruction questions at [phone].     *Insurance Questions, please call [phone] or [phone]

## 2022-11-09 NOTE — ASSESSMENT & PLAN NOTE
- Patient presents today at the request of Dr. Leavitt who plans on performing a simple mastectomy on 11/15.    Known risk factors for perioperative complications: Dementia.  Difficulty with intubation is not anticipated.    Cardiac Risk Estimation: Based on the Revised Cardiac Risk index, patient is a Class 1 risk with a 3.9% risk of a major cardiac event in a low risk procedure.    1.) Preoperative workup as follows: ECG, hemoglobin, hematocrit, electrolytes, creatinine, glucose, liver function studies.  2.) Change in medication regimen before surgery: discontinue ASA 6 days before surgery, discontinue NSAIDs 5 days before surgery.  3.) Prophylaxis for cardiac events with perioperative beta-blockers: not indicated.  4.) Invasive hemodynamic monitoring perioperatively: not indicated.  5.) Deep vein thrombosis prophylaxis postoperatively: intermittent pneumatic compression boots and regimen to be chosen by surgical team.  6.) Surveillance for postoperative MI with ECG immediately postoperatively and on postoperati ve days 1 and 2 AND troponin levels 24 hours postoperatively and on day 4 or hospital discharge (whichever comes first): not indicated.  7.) Current medications which may produce withdrawal symptoms if withheld perioperatively: None.  8.) Other measures: None.
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
## 2022-11-11 ENCOUNTER — TELEPHONE (OUTPATIENT)
Dept: HEMATOLOGY/ONCOLOGY | Facility: CLINIC | Age: 82
End: 2022-11-11
Payer: MEDICARE

## 2022-11-11 PROCEDURE — G0180 PR HOME HEALTH MD CERTIFICATION: ICD-10-PCS | Mod: ,,, | Performed by: INTERNAL MEDICINE

## 2022-11-11 PROCEDURE — G0180 MD CERTIFICATION HHA PATIENT: HCPCS | Mod: ,,, | Performed by: INTERNAL MEDICINE

## 2022-11-11 NOTE — TELEPHONE ENCOUNTER
----- Message from Zain Milton MD sent at 11/11/2022 11:56 AM CST -----  From my standpoint I do not need the patient admitted to [facility]  ----- Message -----  From: Dominga Fairbanks LPN  Sent: 11/11/2022  11:23 AM CST  To: Zain Milton MD      ----- Message -----  From: Tamiko Cota  Sent: 11/11/2022  11:05 AM CST  To: Yoan Chadwick from [facility] is calling in regards to if Dr. Milton would to have patient admitted before or after surgery..Please call her back at [phone]..Alley/nahum

## 2022-11-14 ENCOUNTER — ANESTHESIA EVENT (OUTPATIENT)
Dept: SURGERY | Facility: HOSPITAL | Age: 82
End: 2022-11-14
Payer: MEDICARE

## 2022-11-14 ENCOUNTER — TELEPHONE (OUTPATIENT)
Dept: HEMATOLOGY/ONCOLOGY | Facility: CLINIC | Age: 82
End: 2022-11-14
Payer: MEDICARE

## 2022-11-14 NOTE — TELEPHONE ENCOUNTER
WESLEY spoke with Amber with [facility], who confirmed that pt has been admitted to their services, and will be seen after surgery. No further follow up scheduled. WESLEY will remain available. Jennifer Molina LMSW

## 2022-11-14 NOTE — ANESTHESIA PREPROCEDURE EVALUATION
11/14/2022  Vanessa Vazquez is a 82 y.o., female.    Past Medical History:   Diagnosis Date    Cancer     Breast Cancer    CKD (chronic kidney disease), stage III     Dementia     Fibrocystic breast disease     Hearing loss     Hyperlipidemia     Osteoarthritis of thumb     Osteopenia     Stroke     Incidental finding in 2012.     Past Surgical History:   Procedure Laterality Date    BREAST BIOPSY Left 05/2018    Benign    CATARACT EXTRACTION EXTRACAPSULAR W/ INTRAOCULAR LENS IMPLANTATION         Pre-op Assessment    I have reviewed the Patient Summary Reports.     I have reviewed the Nursing Notes. I have reviewed the NPO Status.   I have reviewed the Medications.     Review of Systems  Anesthesia Hx:  No problems with previous Anesthesia  Denies Family Hx of Anesthesia complications.   Denies Personal Hx of Anesthesia complications.   Social:  Non-Smoker    Hematology/Oncology:  Hematology Normal      Current/Recent Cancer. Breast   Cardiovascular:   hyperlipidemia    Pulmonary:  Pulmonary Normal    Renal/:   Chronic Renal Disease, CKD    Hepatic/GI:  Hepatic/GI Normal    Musculoskeletal:   Arthritis     Neurological:   CVA, no residual symptoms Dementia.   Psych:  Psychiatric Normal              Anesthesia Plan  Type of Anesthesia, risks & benefits discussed:    Anesthesia Type: Gen Supraglottic Airway, Gen ETT  Intra-op Monitoring Plan: Standard ASA Monitors  Post Op Pain Control Plan: multimodal analgesia and IV/PO Opioids PRN  Induction:  IV  Informed Consent: Informed consent signed with the Patient and all parties understand the risks and agree with anesthesia plan.  All questions answered.   ASA Score: 2  Day of Surgery Review of History & Physical: H&P Update referred to the surgeon/provider.    Ready For Surgery From Anesthesia Perspective.     .

## 2022-11-15 ENCOUNTER — HOSPITAL ENCOUNTER (OUTPATIENT)
Facility: HOSPITAL | Age: 82
Discharge: HOME | End: 2022-11-16
Attending: SURGERY | Admitting: SURGERY
Payer: MEDICARE

## 2022-11-15 ENCOUNTER — HOSPITAL ENCOUNTER (OUTPATIENT)
Dept: RADIOLOGY | Facility: HOSPITAL | Age: 82
Discharge: HOME | End: 2022-11-15
Attending: SURGERY | Admitting: SURGERY
Payer: MEDICARE

## 2022-11-15 ENCOUNTER — ANESTHESIA (OUTPATIENT)
Dept: SURGERY | Facility: HOSPITAL | Age: 82
End: 2022-11-15
Payer: MEDICARE

## 2022-11-15 DIAGNOSIS — Z17.0 MALIGNANT NEOPLASM OF CENTRAL PORTION OF LEFT BREAST IN FEMALE, ESTROGEN RECEPTOR POSITIVE: ICD-10-CM

## 2022-11-15 DIAGNOSIS — C50.112 MALIGNANT NEOPLASM OF CENTRAL PORTION OF LEFT BREAST IN FEMALE, ESTROGEN RECEPTOR POSITIVE: ICD-10-CM

## 2022-11-15 PROCEDURE — 88341 PR IHC OR ICC EACH ADD'L SINGLE ANTIBODY  STAINPR: ICD-10-PCS | Mod: 26,,, | Performed by: PATHOLOGY

## 2022-11-15 PROCEDURE — 63600175 PHARM REV CODE 636 W HCPCS: Performed by: SURGERY

## 2022-11-15 PROCEDURE — 25000003 PHARM REV CODE 250: Performed by: SURGERY

## 2022-11-15 PROCEDURE — D9220A PRA ANESTHESIA: Mod: ANES,,, | Performed by: ANESTHESIOLOGY

## 2022-11-15 PROCEDURE — D9220A PRA ANESTHESIA: ICD-10-PCS | Mod: CRNA,,, | Performed by: NURSE ANESTHETIST, CERTIFIED REGISTERED

## 2022-11-15 PROCEDURE — 88342 IMHCHEM/IMCYTCHM 1ST ANTB: CPT | Performed by: PATHOLOGY

## 2022-11-15 PROCEDURE — 38900 PR INTRAOPERATIVE SENTINEL LYMPH NODE ID W DYE INJECTION: ICD-10-PCS | Mod: LT,,, | Performed by: SURGERY

## 2022-11-15 PROCEDURE — 71000033 HC RECOVERY, INTIAL HOUR: Performed by: SURGERY

## 2022-11-15 PROCEDURE — 63600175 PHARM REV CODE 636 W HCPCS

## 2022-11-15 PROCEDURE — 88341 IMHCHEM/IMCYTCHM EA ADD ANTB: CPT | Mod: 26,,, | Performed by: PATHOLOGY

## 2022-11-15 PROCEDURE — 27201423 OPTIME MED/SURG SUP & DEVICES STERILE SUPPLY: Performed by: SURGERY

## 2022-11-15 PROCEDURE — 25000003 PHARM REV CODE 250: Performed by: ANESTHESIOLOGY

## 2022-11-15 PROCEDURE — 19303 PR MASTECTOMY, SIMPLE, COMPLETE: ICD-10-PCS | Mod: LT,,, | Performed by: SURGERY

## 2022-11-15 PROCEDURE — 38525 PR BIOPSY/REM LYMPH NODES, AXILLARY: ICD-10-PCS | Mod: 51,LT,, | Performed by: SURGERY

## 2022-11-15 PROCEDURE — 94799 UNLISTED PULMONARY SVC/PX: CPT

## 2022-11-15 PROCEDURE — 88342 CHG IMMUNOCYTOCHEMISTRY: ICD-10-PCS | Mod: 26,,, | Performed by: PATHOLOGY

## 2022-11-15 PROCEDURE — D9220A PRA ANESTHESIA: Mod: CRNA,,, | Performed by: NURSE ANESTHETIST, CERTIFIED REGISTERED

## 2022-11-15 PROCEDURE — 88342 IMHCHEM/IMCYTCHM 1ST ANTB: CPT | Mod: 26,,, | Performed by: PATHOLOGY

## 2022-11-15 PROCEDURE — 38525 BIOPSY/REMOVAL LYMPH NODES: CPT | Mod: 51,LT,, | Performed by: SURGERY

## 2022-11-15 PROCEDURE — 88307 TISSUE EXAM BY PATHOLOGIST: CPT | Mod: 26,,, | Performed by: PATHOLOGY

## 2022-11-15 PROCEDURE — C9290 INJ, BUPIVACAINE LIPOSOME: HCPCS

## 2022-11-15 PROCEDURE — C1729 CATH, DRAINAGE: HCPCS | Performed by: SURGERY

## 2022-11-15 PROCEDURE — 88305 TISSUE EXAM BY PATHOLOGIST: ICD-10-PCS | Mod: 26,,, | Performed by: PATHOLOGY

## 2022-11-15 PROCEDURE — 37000008 HC ANESTHESIA 1ST 15 MINUTES: Performed by: SURGERY

## 2022-11-15 PROCEDURE — 88307 PR  SURG PATH,LEVEL V: ICD-10-PCS | Mod: 26,,, | Performed by: PATHOLOGY

## 2022-11-15 PROCEDURE — 38792 RA TRACER ID OF SENTINL NODE: CPT | Mod: TC

## 2022-11-15 PROCEDURE — 38900 IO MAP OF SENT LYMPH NODE: CPT | Mod: LT,,, | Performed by: SURGERY

## 2022-11-15 PROCEDURE — 36000706: Performed by: SURGERY

## 2022-11-15 PROCEDURE — 63600175 PHARM REV CODE 636 W HCPCS: Performed by: ANESTHESIOLOGY

## 2022-11-15 PROCEDURE — 38792 RA TRACER ID OF SENTINL NODE: CPT | Mod: ,,, | Performed by: RADIOLOGY

## 2022-11-15 PROCEDURE — 88341 IMHCHEM/IMCYTCHM EA ADD ANTB: CPT | Mod: 59 | Performed by: PATHOLOGY

## 2022-11-15 PROCEDURE — 88307 TISSUE EXAM BY PATHOLOGIST: CPT | Mod: 59 | Performed by: PATHOLOGY

## 2022-11-15 PROCEDURE — 38792 NM SENTINEL LYMPH NODE INJECTION ONLY_RAD PERFORMED: ICD-10-PCS | Mod: ,,, | Performed by: RADIOLOGY

## 2022-11-15 PROCEDURE — 36000707: Performed by: SURGERY

## 2022-11-15 PROCEDURE — 37000009 HC ANESTHESIA EA ADD 15 MINS: Performed by: SURGERY

## 2022-11-15 PROCEDURE — 19303 MAST SIMPLE COMPLETE: CPT | Mod: LT,,, | Performed by: SURGERY

## 2022-11-15 PROCEDURE — 88305 TISSUE EXAM BY PATHOLOGIST: CPT | Performed by: PATHOLOGY

## 2022-11-15 PROCEDURE — 71000039 HC RECOVERY, EACH ADD'L HOUR: Performed by: SURGERY

## 2022-11-15 PROCEDURE — 88305 TISSUE EXAM BY PATHOLOGIST: CPT | Mod: 26,,, | Performed by: PATHOLOGY

## 2022-11-15 PROCEDURE — D9220A PRA ANESTHESIA: ICD-10-PCS | Mod: ANES,,, | Performed by: ANESTHESIOLOGY

## 2022-11-15 RX ORDER — ISOSULFAN BLUE 50 MG/5ML
INJECTION, SOLUTION SUBCUTANEOUS
Status: DISPENSED
Start: 2022-11-15 | End: 2022-11-16

## 2022-11-15 RX ORDER — HYDRALAZINE HYDROCHLORIDE 20 MG/ML
10 INJECTION INTRAMUSCULAR; INTRAVENOUS EVERY 6 HOURS PRN
Status: DISCONTINUED | OUTPATIENT
Start: 2022-11-15 | End: 2022-11-16 | Stop reason: HOSPADM

## 2022-11-15 RX ORDER — SODIUM CHLORIDE, SODIUM LACTATE, POTASSIUM CHLORIDE, CALCIUM CHLORIDE 600; 310; 30; 20 MG/100ML; MG/100ML; MG/100ML; MG/100ML
INJECTION, SOLUTION INTRAVENOUS CONTINUOUS
Status: DISCONTINUED | OUTPATIENT
Start: 2022-11-15 | End: 2022-11-15

## 2022-11-15 RX ORDER — DIPHENHYDRAMINE HYDROCHLORIDE 50 MG/ML
25 INJECTION INTRAMUSCULAR; INTRAVENOUS EVERY 6 HOURS PRN
Status: DISCONTINUED | OUTPATIENT
Start: 2022-11-15 | End: 2022-11-16 | Stop reason: HOSPADM

## 2022-11-15 RX ORDER — ACETAMINOPHEN 325 MG/1
650 TABLET ORAL EVERY 4 HOURS PRN
Status: DISCONTINUED | OUTPATIENT
Start: 2022-11-15 | End: 2022-11-16 | Stop reason: HOSPADM

## 2022-11-15 RX ORDER — DIPHENHYDRAMINE HYDROCHLORIDE 50 MG/ML
25 INJECTION INTRAMUSCULAR; INTRAVENOUS EVERY 4 HOURS PRN
Status: DISCONTINUED | OUTPATIENT
Start: 2022-11-15 | End: 2022-11-16 | Stop reason: HOSPADM

## 2022-11-15 RX ORDER — BUPIVACAINE HYDROCHLORIDE 2.5 MG/ML
INJECTION, SOLUTION EPIDURAL; INFILTRATION; INTRACAUDAL
Status: DISCONTINUED | OUTPATIENT
Start: 2022-11-15 | End: 2022-11-15 | Stop reason: HOSPADM

## 2022-11-15 RX ORDER — FENTANYL CITRATE 50 UG/ML
25 INJECTION, SOLUTION INTRAMUSCULAR; INTRAVENOUS EVERY 5 MIN PRN
Status: DISCONTINUED | OUTPATIENT
Start: 2022-11-15 | End: 2022-11-16 | Stop reason: HOSPADM

## 2022-11-15 RX ORDER — ONDANSETRON 2 MG/ML
4 INJECTION INTRAMUSCULAR; INTRAVENOUS ONCE AS NEEDED
Status: DISCONTINUED | OUTPATIENT
Start: 2022-11-15 | End: 2022-11-16 | Stop reason: HOSPADM

## 2022-11-15 RX ORDER — LIDOCAINE HYDROCHLORIDE 10 MG/ML
INJECTION, SOLUTION EPIDURAL; INFILTRATION; INTRACAUDAL; PERINEURAL
Status: DISCONTINUED | OUTPATIENT
Start: 2022-11-15 | End: 2022-11-15

## 2022-11-15 RX ORDER — SODIUM CHLORIDE 9 MG/ML
INJECTION, SOLUTION INTRAVENOUS CONTINUOUS
Status: DISCONTINUED | OUTPATIENT
Start: 2022-11-15 | End: 2022-11-15

## 2022-11-15 RX ORDER — CEFAZOLIN SODIUM 2 G/50ML
2 SOLUTION INTRAVENOUS
Status: DISCONTINUED | OUTPATIENT
Start: 2022-11-15 | End: 2022-11-15

## 2022-11-15 RX ORDER — ACETAMINOPHEN 325 MG/1
650 TABLET ORAL EVERY 6 HOURS PRN
Status: DISCONTINUED | OUTPATIENT
Start: 2022-11-15 | End: 2022-11-16 | Stop reason: HOSPADM

## 2022-11-15 RX ORDER — BUPIVACAINE HYDROCHLORIDE 2.5 MG/ML
INJECTION, SOLUTION EPIDURAL; INFILTRATION; INTRACAUDAL
Status: DISCONTINUED
Start: 2022-11-15 | End: 2022-11-15 | Stop reason: WASHOUT

## 2022-11-15 RX ORDER — PROPOFOL 10 MG/ML
VIAL (ML) INTRAVENOUS
Status: DISCONTINUED | OUTPATIENT
Start: 2022-11-15 | End: 2022-11-15

## 2022-11-15 RX ORDER — EPINEPHRINE 1 MG/ML
INJECTION, SOLUTION, CONCENTRATE INTRAVENOUS
Status: DISPENSED
Start: 2022-11-15 | End: 2022-11-16

## 2022-11-15 RX ORDER — CLONIDINE HYDROCHLORIDE 0.1 MG/1
0.1 TABLET ORAL EVERY 6 HOURS PRN
Status: DISCONTINUED | OUTPATIENT
Start: 2022-11-15 | End: 2022-11-16 | Stop reason: HOSPADM

## 2022-11-15 RX ORDER — ONDANSETRON 4 MG/1
8 TABLET, ORALLY DISINTEGRATING ORAL EVERY 8 HOURS PRN
Status: DISCONTINUED | OUTPATIENT
Start: 2022-11-15 | End: 2022-11-16 | Stop reason: HOSPADM

## 2022-11-15 RX ORDER — BUPIVACAINE HYDROCHLORIDE 2.5 MG/ML
INJECTION, SOLUTION EPIDURAL; INFILTRATION; INTRACAUDAL
Status: DISPENSED
Start: 2022-11-15 | End: 2022-11-16

## 2022-11-15 RX ORDER — SODIUM CHLORIDE 9 MG/ML
50 INJECTION, SOLUTION INTRAVENOUS CONTINUOUS
Status: DISCONTINUED | OUTPATIENT
Start: 2022-11-15 | End: 2022-11-16 | Stop reason: HOSPADM

## 2022-11-15 RX ORDER — SUCCINYLCHOLINE CHLORIDE 20 MG/ML
INJECTION INTRAMUSCULAR; INTRAVENOUS
Status: DISCONTINUED | OUTPATIENT
Start: 2022-11-15 | End: 2022-11-15

## 2022-11-15 RX ORDER — EPINEPHRINE 1 MG/ML
INJECTION, SOLUTION, CONCENTRATE INTRAVENOUS
Status: DISCONTINUED | OUTPATIENT
Start: 2022-11-15 | End: 2022-11-15 | Stop reason: HOSPADM

## 2022-11-15 RX ORDER — TALC
6 POWDER (GRAM) TOPICAL NIGHTLY PRN
Status: DISCONTINUED | OUTPATIENT
Start: 2022-11-15 | End: 2022-11-16 | Stop reason: HOSPADM

## 2022-11-15 RX ORDER — ONDANSETRON 2 MG/ML
INJECTION INTRAMUSCULAR; INTRAVENOUS
Status: DISCONTINUED | OUTPATIENT
Start: 2022-11-15 | End: 2022-11-15

## 2022-11-15 RX ORDER — HYDROCODONE BITARTRATE AND ACETAMINOPHEN 5; 325 MG/1; MG/1
1 TABLET ORAL
Status: DISCONTINUED | OUTPATIENT
Start: 2022-11-15 | End: 2022-11-16 | Stop reason: HOSPADM

## 2022-11-15 RX ORDER — ISOSULFAN BLUE 50 MG/5ML
INJECTION, SOLUTION SUBCUTANEOUS
Status: DISCONTINUED | OUTPATIENT
Start: 2022-11-15 | End: 2022-11-15 | Stop reason: HOSPADM

## 2022-11-15 RX ORDER — OXYCODONE HYDROCHLORIDE 5 MG/1
10 TABLET ORAL EVERY 4 HOURS PRN
Status: DISCONTINUED | OUTPATIENT
Start: 2022-11-15 | End: 2022-11-16 | Stop reason: HOSPADM

## 2022-11-15 RX ORDER — MEPERIDINE HYDROCHLORIDE 25 MG/ML
12.5 INJECTION INTRAMUSCULAR; INTRAVENOUS; SUBCUTANEOUS ONCE
Status: DISCONTINUED | OUTPATIENT
Start: 2022-11-15 | End: 2022-11-16 | Stop reason: HOSPADM

## 2022-11-15 RX ORDER — ALBUTEROL SULFATE 0.83 MG/ML
2.5 SOLUTION RESPIRATORY (INHALATION) EVERY 4 HOURS PRN
Status: DISCONTINUED | OUTPATIENT
Start: 2022-11-15 | End: 2022-11-16 | Stop reason: HOSPADM

## 2022-11-15 RX ORDER — FENTANYL CITRATE 50 UG/ML
INJECTION, SOLUTION INTRAMUSCULAR; INTRAVENOUS
Status: DISCONTINUED | OUTPATIENT
Start: 2022-11-15 | End: 2022-11-15

## 2022-11-15 RX ORDER — OXYCODONE HYDROCHLORIDE 5 MG/1
5 TABLET ORAL EVERY 4 HOURS PRN
Status: DISCONTINUED | OUTPATIENT
Start: 2022-11-15 | End: 2022-11-16 | Stop reason: HOSPADM

## 2022-11-15 RX ADMIN — GLYCOPYRROLATE 0.2 MG: 0.2 INJECTION, SOLUTION INTRAMUSCULAR; INTRAVITREAL at 03:11

## 2022-11-15 RX ADMIN — FENTANYL CITRATE 25 MCG: 50 INJECTION, SOLUTION INTRAMUSCULAR; INTRAVENOUS at 03:11

## 2022-11-15 RX ADMIN — ONDANSETRON 4 MG: 2 INJECTION, SOLUTION INTRAMUSCULAR; INTRAVENOUS at 03:11

## 2022-11-15 RX ADMIN — SODIUM CHLORIDE, SODIUM LACTATE, POTASSIUM CHLORIDE, AND CALCIUM CHLORIDE: .6; .31; .03; .02 INJECTION, SOLUTION INTRAVENOUS at 11:11

## 2022-11-15 RX ADMIN — BUPIVACAINE 266 MG: 13.3 INJECTION, SUSPENSION, LIPOSOMAL INFILTRATION at 04:11

## 2022-11-15 RX ADMIN — CEFAZOLIN SODIUM 2 G: 2 SOLUTION INTRAVENOUS at 02:11

## 2022-11-15 RX ADMIN — CLONIDINE HYDROCHLORIDE 0.1 MG: 0.1 TABLET ORAL at 05:11

## 2022-11-15 RX ADMIN — SUCCINYLCHOLINE CHLORIDE 100 MG: 20 INJECTION, SOLUTION INTRAMUSCULAR; INTRAVENOUS at 02:11

## 2022-11-15 RX ADMIN — FENTANYL CITRATE 25 MCG: 50 INJECTION, SOLUTION INTRAMUSCULAR; INTRAVENOUS at 02:11

## 2022-11-15 RX ADMIN — FENTANYL CITRATE 25 MCG: 50 INJECTION, SOLUTION INTRAMUSCULAR; INTRAVENOUS at 04:11

## 2022-11-15 RX ADMIN — LIDOCAINE HYDROCHLORIDE 100 MG: 10 INJECTION, SOLUTION EPIDURAL; INFILTRATION; INTRACAUDAL; PERINEURAL at 02:11

## 2022-11-15 RX ADMIN — PROPOFOL 80 MG: 10 INJECTION, EMULSION INTRAVENOUS at 02:11

## 2022-11-15 NOTE — ANESTHESIA POSTPROCEDURE EVALUATION
Anesthesia Post Evaluation    Patient: Vanessa Vazquez    Procedure(s) Performed: Procedure(s) (LRB):  MASTECTOMY, SIMPLE (Left)  BIOPSY, LYMPH NODE, SENTINEL (Left)    Final Anesthesia Type: general      Patient location during evaluation: PACU  Patient participation: Yes- Able to Participate  Level of consciousness: awake and alert and oriented  Post-procedure vital signs: reviewed and stable  Pain management: adequate  Airway patency: patent    PONV status at discharge: No PONV  Anesthetic complications: no      Cardiovascular status: blood pressure returned to baseline, stable and hemodynamically stable  Respiratory status: unassisted  Hydration status: euvolemic  Follow-up not needed.          Vitals Value Taken Time   /81 11/15/22 1700   Temp 36.3 °C (97.3 °F) 11/15/22 1637   Pulse 66 11/15/22 1701   Resp 18 11/15/22 1645   SpO2 94 % 11/15/22 1701   Vitals shown include unvalidated device data.      No case tracking events are documented in the log.      Pain/Noelle Score: Noelle Score: 8 (11/15/2022  4:37 PM)

## 2022-11-15 NOTE — ANESTHESIA PROCEDURE NOTES
Intubation    Date/Time: 11/15/2022 2:29 PM  Performed by: Vern Chau MD  Authorized by: Vern Chau MD     Intubation:     Induction:  Intravenous    Intubated:  Postinduction    Mask Ventilation:  Easy mask    Attempts:  1    Attempted By:  CRNA    Method of Intubation:  Direct    Blade:  Lyndon 3    Laryngeal View Grade: Grade I - full view of cords      Difficult Airway Encountered?: No      Complications:  None    Airway Device:  Oral endotracheal tube    Airway Device Size:  7.0    Style/Cuff Inflation:  Cuffed    Secured at:  The teeth    Placement Verified By:  Capnometry    Complicating Factors:  None    Findings Post-Intubation:  BS equal bilateral

## 2022-11-15 NOTE — TRANSFER OF CARE
"Anesthesia Transfer of Care Note    Patient: Vanessa Vazquez    Procedure(s) Performed: Procedure(s) (LRB):  MASTECTOMY, SIMPLE (Left)  BIOPSY, LYMPH NODE, SENTINEL (Left)    Patient location: PACU    Anesthesia Type: general    Transport from OR: Transported from OR on room air with adequate spontaneous ventilation    Post pain: adequate analgesia    Post assessment: no apparent anesthetic complications    Post vital signs: stable    Level of consciousness: awake    Nausea/Vomiting: no nausea/vomiting    Complications: none    Transfer of care protocol was followed      Last vitals:   Visit Vitals  BP (!) 170/75 (BP Location: Right arm, Patient Position: Sitting)   Pulse 60   Temp 36.1 °C (97 °F) (Temporal)   Resp 20   Ht 5' 2" (1.575 m)   Wt 65.4 kg (144 lb 1.1 oz)   SpO2 97%   Breastfeeding No   BMI 26.35 kg/m²     "

## 2022-11-15 NOTE — PATIENT INSTRUCTIONS
POSTOPERATIVE INSTRUCTIONS FOLLOWING   MASTECTOMY AND/OR AXILLARY LYMPH NODE DISSECTION    The following are post-operative instructions that will help you to recover from your surgery.  Please read over these instructions carefully and contact us if we can answer any of your questions or concerns.    Dressing/breast binder (surgi-bra)  A surgical bra may be placed around your chest after your surgery.  If you are given the bra, please wear it as close to 24 hours a day as possible until your post-operative clinic appointment.  If the elastic around the bra irritates your skin, you may wear a soft t-shirt underneath the bra.    You may go without wearing the bra long enough to bath, to launder and dry the bra. If you have fluffy filler placed inside the bra, the filler should be removed whenever the bra is taken off. Please reinsert the fluffy filler, or insert the new soft filler, under the bra when you put the bra back on.  If the bra is extremely uncomfortable, you may wear a supportive sports bra instead after 2 days.    You may shower AFTER the drains are removed.  Please sponge bathe until then. Do not take a tub bath and do not soak the surgical site. Please do not remove the white strips of tape (steri-strips) that cover your incision.  They will be removed at your clinic visit.    Activity   You will be able to do much of your own personal care, such as bathing, dressing, preparing simple meals, etc.  A short walk each day will help with your recovery  You may find that you need to take rest breaks between activities, but you should not need to stay in bed for prolonged periods of time during the day  You may resume light household activities such as simple meal preparation, folding laundry, using your computer, and completing paperwork as you feel ready  Please avoid activities that require moderate to heavy lifting (grocery shopping) or pushing/pulling (vacuuming) and repetitive motions (such as washing  windows or long hours on the computer). Do not lift anything heavier than a gallon of milk.  A good rule during this time is to listen to your body, do what is comfortable, and stop and rest when your feel tired.  If it hurts, dont do it.  Following a lymph node dissection, dont avoid using your arm, but dont exercise your arm until after your first post-operative visit.  At your first post-op visit, you will be given arm exercises to regain movement and flexibility.  You may be referred to physical therapy.  You may restart driving when you are no longer on narcotics, your drain has been removed, and you feel safe turning the wheel and stopping quickly.  You will need to be out of work approximately 2-6 weeks depending on your particular surgery and how well you are recovering.  We will evaluate how you are doing at the first post-op appointment.  This is a good time to ask when you may return to work and what activities you may do.    Medication for pain  You will be given a prescription for pain medication. You should not drive or operate machinery while taking these.  Please take narcotics with food.  Narcotics can cause, or worse, constipation.  You will need to increase your fluid intake, eat high fiber foods (such as fruits and bran) and make sure that you are up and walking. You may need to take an over the counter stool softener for constipation.  An icepack may be helpful to decrease discomfort and swelling, particularly to the armpit after a lymph node dissection.  A small pillow positioned in the armpit may also decrease discomfort after a lymph node dissection.  If you are given a prescription for antibiotics, please continue to take them until the drains have been removed.    How to care for your Drain(s)  Wash hands--STRIP or milk the drainage tube as it comes out of your body toward the bulb.   Beginning where the drain comes out of your body, hold drainage tubing with one hand and with the  other, stretch and release tubing an inch at time while moving downward with both hands toward the bulb.  Do this 2-3 times before emptying the bulb.  Remove the stopper from the bulbs port  (drainage port)  Pour the drainage in the measuring cup provided by the nurse  Flatten/squeeze the bulb to create a vacuum and replace the stopper before letting go the of the bulb.  Record the date, time and amount of drainage in ccs (not ounces) each time bulb is emptied. If you have more than one drain, record each separately.  Discard the drainage into the toilet after measuring and then wash hands.  Empty bulbs 3 times/day or as needed if it fills up before 8 hours.  Remember to bring the output record with you to your doctors appointment.    Please report the following:  Temperature greater than 101 degrees  Discharge or bad odor from the wound  Excessive bleeding, such as bloody dressing or extreme bruising  Redness at incision and/or drain sites  Swelling or buildup of fluid around incision  If blue dye was used to locate your sentinel lymph nodes, your urine and stool may be blue-green in color for 1 or 2 days.    Additional information  I will see you approximately 1 week following your surgery.  If this follow-up appointment has not been made, please call the office.    If you have any questions or problems, please call my office.    Dr. Sabrina Leavitt    [phone]    After hours, please contact the [facility]  at [phone].

## 2022-11-15 NOTE — H&P
Breast Surgical Oncology  [place]     Date of Service: 2022     SUBJECTIVE:   Chief complaint: breast cancer     HISTORY OF PRESENT ILLNESS:   Vanessa Vazquez is a 82 y.o. female who is kindly referred by Dr. Magui Jc for left breast cancer.     10/13/2022  The patient has newly diagnosed left invasive duct cell carcinoma by way of abnormal screening mammogram. She was seen today along with her gonzález . She has a diagnosis of dementia but is overall healthy and functions well. She works at an [employer]. She denies breast concerns such as pain, masses, skin changes, nipple discharge, nipple retraction or lumps under the arm. She had an abnormal mammogram and ultrasound in 2018 for which she underwent US core needle biopsy which was benign. Her recent mammogram detected an asymmetry and diagnostic ultrasound identified a central mass which was biopsy positive cancer. On post biopsy diagnostic mammography, the biopsy clip did not correlate with the mammographic asymmetry. She then underwent a stereotactic biopsy of the UOQ finding which was also biopsy positive cancer.     10/17/2022  Patient here to discuss recent breast MRI results. Presented with another focus with recommendation for biopsy, however would require MRI biopsy. She has multicentric disease based on ultrasound biopsied masses. She has a third suspicious mass (deep central, 5 cm posterior to the retro areolar biopsy positive IDC) detected on the MRI which has not been biopsied. These masses span a large volume of the breast making breast conservation not possible. She and her  understand that she is no longer a candidate for lumpectomy.      2022  They are here today for preoperative visit at the request of the family. No new issues or concerns regarding the breast.      Her breast cancer risk factor profile is as follows: Menarche at 14, Menopause at 50.  She is . Age at first live birth was 25.  Family history of cancer is as follows: mother with colon cancer at age 101- cause of death at age 101.     FAMILY HISTORY:            Family History   Problem Relation Age of Onset    Glaucoma Brother      Hyperlipidemia Mother      Heart disease Father      Osteoporosis Cousin      Coronary artery disease Maternal Uncle           PAST MEDICAL HISTORY:           Past Medical History:   Diagnosis Date    CKD (chronic kidney disease), stage III      Dementia      Fibrocystic breast disease      Hearing loss      Hyperlipidemia      Osteoarthritis of thumb      Osteopenia           SURGICAL HISTORY:            Past Surgical History:   Procedure Laterality Date    BREAST BIOPSY Left 05/2018     Benign    CATARACT EXTRACTION EXTRACAPSULAR W/ INTRAOCULAR LENS IMPLANTATION             SOCIAL HISTORY:      Social History           Tobacco Use    Smoking status: Never    Smokeless tobacco: Never   Substance Use Topics    Alcohol use: No    Drug use: No         MEDICATIONS/ALLERGIES:      Current Outpatient Medications:     aspirin (ECOTRIN) 81 MG EC tablet, Take 81 mg by mouth once daily., Disp: , Rfl:     donepeziL (ARICEPT) 10 MG tablet, TAKE ONE TABLET BY MOUTH ONE TIME DAILY, Disp: 30 tablet, Rfl: 11    gatifloxacin 0.5 % Drop drops, Apply 1 drop to eye 2 (two) times daily. Eyedrops to start one day before surgery (Patient not taking: Reported on 10/25/2022), Disp: 5 mL, Rfl: 2    ketorolac 0.5% (ACULAR) 0.5 % Drop, Place 1 drop into the left eye 4 (four) times daily. Eyedrops to start one day before surgery (Patient not taking: Reported on 10/25/2022), Disp: 5 mL, Rfl: 1    moxifloxacin (VIGAMOX) 0.5 % ophthalmic solution, Place 1 drop into the right eye every 12 (twelve) hours. Start eyedrops one day before surgery (Patient not taking: Reported on 10/25/2022), Disp: 5 mL, Rfl: 2    pravastatin (PRAVACHOL) 40 MG tablet, TAKE ONE TABLET BY MOUTH ONE TIME DAILY, Disp: 90 tablet, Rfl: 3    prednisoLONE acetate (PRED  FORTE) 1 % DrpS, Place 1 drop into the left eye 4 (four) times daily. Eyedrops to start one day before surgery (Patient not taking: Reported on 10/25/2022), Disp: 5 mL, Rfl: 1    vitamin D (VITAMIN D3) 1000 units Tab, Take 1,000 Units by mouth once daily., Disp: , Rfl:   Review of patient's allergies indicates:  No Known Allergies     REVIEW OF SYSTEMS:   I have reviewed 12 systems, including 2 points per system. Pertinent reported positives are: frequent urination, on blood thinners, and hearing loss.     PHYSICAL EXAM:   General: The patient appears well and is in no acute distress.      BREAST EXAM  No Asymmetry  Right:  - Mass: No  - Skin change: No  - Nipple Discharge: No  - Nipple retraction: No  - Axillary LAD: No  Left:   - Mass: No  - Skin change: No  - Nipple Discharge: No  - Nipple retraction: No  - Axillary LAD: No     IMAGING:   Images were personally reviewed.      Results for orders placed during the hospital encounter of 10/14/22     MRI Breast w/wo Contrast, w/CAD, Bilateral     Narrative  Result:  MRI Breast w/wo Contrast, w/CAD, Bilateral     History:  Patient is 82 y.o. and is seen for malignant neoplasm of central portion of left breast in female, estrogen receptor positive.     Films Compared:  Compared to: 10/04/2022 US Breast Biopsy with Imaging 1st site Left, 10/04/2022 Mammo Digital Diagnostic Left, 10/04/2022 Mammo Breast Stereotactic Biopsy Left, 09/22/2022 US Breast Left Limited, 09/22/2022 Mammo Digital Diagnostic Left with Enrique, 09/14/2022 Mammo Digital Screening Bilat w/ Enrique, 09/03/2021 Mammo Digital Screening Bilat w/ Enrique, 12/11/2019 Mammo Digital Screening Bilat w/ Enrique, 11/21/2018 Mammo Digital Diagnostic Left w/ Enrique, 11/21/2018 US Breast Left Limited, 05/17/2018 US Breast Biopsy with Imaging 1st site L, 05/17/2018 Mammo Digital Diagnostic Post Procedure_Mammo Guide_Clip_Needle Loc only, 05/09/2018 Mammo Digital Diagnostic Left with Tomosynthesis_CAD, 05/09/2018 US Breast Left  Limited, 04/27/2018 Mammo Digital Screening Bilat with Tomosynthesis_CAD, 03/15/2017 Mammo Digital Screening Bilat with Tomosynthesis_CAD, 03/10/2016 Mammo Digital Screening Bilat with Tomosynthesis_CAD, 02/19/2015 Mammo Digital Screening Bilat with Tomosynthesis_CAD, and 11/18/2013 Mammo Digital Screening Bilat with CAD     Technique:  A routine breast MRI was performed with a dedicated breast coil. Pre-contrast STIR were acquired. Then, pre and post contrast T1 weighted fat saturated images were acquired and subtracted with MIP reconstruction. 15 ml of intravenous gadolinium contrast was administered. The study was reviewed with [employer] software.     Findings:  Left  There is a non-mass enhancement seen in the retroareolar region of the left breast with associated post-biopsy changes.  This area correlates to a biopsy proven malignancy (recently  Performed via ultrasound).  Clip marker in place. This area measured up to 17 mm on the recent sonogram.     There is a non-mass enhancement seen in the upper inner quadrant of the left breast at 10 o'clock.   This area also correlates to a biopsy proven malignancy with area measuring approximately 22 mm .     The more posteriorly biopsied lesion is located 5.2 cm posteromedial to the retroareolar lesion on cc view on the recent mammogram  and just over 5 cm posterosuperior to it as measured on ml view.  MRI demonstrates non-mass enhancement between these lesions as well, concerning for disease involvement.     There is a non-mass enhancement seen in the central region of the left breast with a few of these areas within this region appearing more linear. See image 70-72 sub 1 sequence.  This area is located 6 cm from the nipple and extends for 3 cm posteriorly.  There are associated type I kinetics.  If breast conservation is desired or if it would affect management, would also recommend biopsy via MRI of this area.     Right  There is no evidence of suspicious masses,  abnormal enhancement, or other abnormal findings in the right breast.     Impression  Left  Non-mass Enhancement: Left breast non-mass enhancement at the retroareolar position. Assessment: 6 - Known biopsy, proven malignancy.  Non-mass Enhancement: Left breast non-mass enhancement at the upper inner 10 o'clock position. Assessment: 6 - Known biopsy, proven malignancy.  Non-mass Enhancement: Left breast non-mass enhancement at the central position. Assessment: 4 - Suspicious finding. Biopsy is recommended.     Right  There is no MR evidence of malignancy in the right breast.     BI-RADS Category:  Overall: 4 - Suspicious     Recommendation:  MRI Biopsy can be performed for non-mass enhancement central left breast if it would affect management as above.     Surgical consult for left breast recommended.        Results for orders placed during the hospital encounter of 09/22/22     Mammo Digital Diagnostic Left with Enrique     Narrative  Result:  Mammo Digital Diagnostic Left with Enrique  US Breast Left Limited     History:  Patient is 82 y.o. and is seen for diagnostic imaging.     Films Compared:  Prior images (if available) were compared.     Findings:  This procedure was performed using tomosynthesis. Computer-aided detection was utilized in the interpretation of this examination.  Left  Mammo Digital Diagnostic Left with Enrique  The left breast has scattered areas of fibroglandular density. Asymmetry/mass with architectural distortion persists within the upper inner breast.     Ultrasound was performed and demonstrates an irregular hypoechoic mass measuring 1.7 x 1.2 cm at the 10 o'clock position. No concerning axillary lymph nodes.     Impression  Suspicious mass at the 10 o'clock position.        BI-RADS Category:  Overall: 4 - Suspicious        Recommendation:  Ultrasound Biopsy is recommended.        Your estimated lifetime risk of breast cancer (to age 85) based on Tyrer-Cuzick risk assessment model is Tyrer-Cuzick:  0.37 %. According to the American Cancer Society, patients with a lifetime breast cancer risk of 20% or higher might benefit from supplemental screening tests.        Results for orders placed during the hospital encounter of 11/21/18     Mammo Digital Diagnostic Left w/ Enrique     Narrative  Result:  Mammo Digital Diagnostic Left w/ Enrique  US Breast Left Limited     History:  Patient is 78 y.o. and is seen for a diagnostic mammogram.     Films Compared:  Prior images (if available) were compared.     Findings:  This procedure was performed using tomosynthesis.  Computer-aided detection was utilized in the interpretation of this examination.  Mammo Digital Diagnostic Left w/ Enrique  The breast has scattered areas of fibroglandular density. There is no evidence of suspicious masses, calcifications, or other abnormal findings.     Post biopsy clip noted within the 3:00 position of the left breast. No mammographic abnormality appreciated. Previously discussed 11 mm mass at the lower inner anterior breast is not definitely visualized. There is a stable benign oil cysts noted.           US Breast Left Limited     Ultrasound of the 9 o'clock position demonstrates decrease in size of the previously suspected mildly complex cyst. This may in fact represent focal area of ductal dilatation. There are mildly prominent adjacent ducts which are debris-filled without concerning intraductal mass     Impression  There is no mammographic or sonographic evidence of malignancy.     BI-RADS Category:  Left: 2 - Benign  Overall: 2 - Benign     Recommendation:  Patient is due for routine screening mammogram May 2019.        The patient's estimated lifetime risk of breast cancer (to age 85) based on Tyrer-Cuzick - 7 risk assessment model is: Tyrer-Cuzick: 2.17 %. According to the American Cancer Society,  patients with a lifetime breast cancer risk of 20% or higher might benefit from supplemental screening tests.        Results for orders  placed during the hospital encounter of 05/09/18     Mammo Digital Diagnostic Left with Tomosynthesis_CAD     Narrative  Result:  Mammo Digital Diagnostic Left with Tomosynthesis_CAD  US Breast Left Limited     History:  Patient is 77 y.o. and is seen for a diagnostic mammogram.     Films Compared:  04/27/2018 Mammo Digital Screening Bilat with Tomosynthesis_CAD, and 03/15/2017 Mammo Digital Screening Bilat with Tomosynthesis_CAD     Findings:  This procedure was performed using tomosynthesis.  Computer-aided detection was utilized in the interpretation of this examination.  Mammo Digital Diagnostic Left with Tomosynthesis_CAD  The breast has scattered areas of fibroglandular density.     There is an 8 mm round mass with obscured margins seen in the left breast at 3 o'clock on the ML view.     There is an 11 mm round mass with circumscribed margins seen in the lower inner quadrant of the left breast in the anterior depth.     US Breast Left Limited  There is a 5 mm x 4 mm x 5 mm hypoechoic mass with shadowing seen in the left breast at 3 o'clock.     There is a 16 mm x 7 mm x 11 mm complicated cyst seen in the lower inner quadrant of the left breast.     Impression  Left  Cyst: Left breast 16 mm x 7 mm x 11 mm cyst at the lower inner position. Assessment: 3 - Probably benign. Short Interval Follow-Up in 6 Months is recommended.  Mass: Left breast 8 mm mass at the 3 o'clock position. Assessment: 4 - Suspicious finding. Ultrasound-guided biopsy is recommended.     BI-RADS Category:  Left: 4 - Suspicious  Overall: 4 - Suspicious     Recommendation:  Short interval follow-up is recommended in 6 Months.  Ultrasound-guided biopsy is recommended.     The patient's estimated lifetime risk of breast cancer (to age 85) based on Tyrer-Cuzick - 7 risk assessment model is: Tyrer-Cuzick: 2.72 %. According to the American Cancer Society,  patients with a lifetime breast cancer risk of 20% or higher might benefit from supplemental  screening tests.        PATHOLOGY:             Lab Results   Component Value Date     FPATHDX   10/04/2022       1. Left breast (10 o'clock position), biopsy:      -  Invasive mammary carcinoma with lobular features      -  Grade 1 of 3 (tubule formation:  3, pleomorphism:  1, mitotic rate:  1)      -  Single focus measuring less than 1 mm in greatest linear dimension      -  Microcalcifications:  Not identified      -  Ductal carcinoma in Situ (DCIS):  Not identified  Comment:  Immunohistochemical stains and breast biomarkers were attempted.  Unfortunately, the focus of tumor was scant the tissue was exhausted prior to  staining.  Stains were therefore noncontributory and the billing will be  adjusted accordingly.  2. Left breast, biopsy:      -  Invasive ductal carcinoma with lobular features      -  Grade 1 of 3 (tubule formation: 3, pleomorphism: 1, mitotic rate: 1)      -  At least 0.8 cm in greatest linear dimension      -  Microcalcifications:  Not identified      -  Ductal carcinoma in situ (DCIS): Not identified      -  Breast biomarkers:           ER:   Positive (strong, greater than 95%)           GA:   Positive (strong, greater than 95%)           Her2:  Negative (0)           Ki67:  15%  Comment: An immunohistochemical stain for E-cadherin shows tumor cell  positivity, confirming ductal origin               ASSESSMENT:      1. Malignant neoplasm of central portion of left breast in female, estrogen receptor positive          PLAN:      Vanessa Vazquez is a 82 y.o. female who is new diagnosis of multicentric Stage IA (T1c N0 Mx) LEFT Breast Invasive Ductal  Carcinoma, Grade 1, ER >95%, GA >95%, [id number] 0 with a ki67 of 15%. I have reviewed her imaging and pathology reports with her and I have provided her with copies.     She has multicentric disease based on ultrasound biopsied masses. She has a third suspicious mass (deep central, 5 cm posterior to the retro areolar biopsy positive IDC) detected on the  MRI which has not been biopsied. These masses span a large volume of the breast making breast conservation not possible. She and her  understand that she is no longer a candidate for lumpectomy.      I reviewed that in the setting of a mastectomy, radiation will depend on final pathology. We discussed post mastectomy reconstruction at length. They are not interested in reconstruction at this time.      We reviewed the need for sentinel lymph node biopsy to further stage the axilla.        Because her cancer is hormone receptor positive, she will be recommended for endocrine therapy.  The decision for or against adjuvant chemotherapy will be made following surgery. She understands that she will be referred to a medical oncologist to discuss these points further.     She is scheduled for left total mastectomy with sentinel lymph node biopsy on 11/15. The risks of surgery were discussed with the patient, including pain, bleeding, infections, scarring, cosmetic deformity, numbness, lymphedema, injury to adjacent structures such as nerves that affect movement of the arm, need for additional surgery for margins or lymph nodes, need for additional treatments and recurrence.  She understands that if sentinel lymph node biopsy shows metastasis, a completion axillary dissection may be done.  She has provided informed consent.      I spent a total of 30 minutes on this visit. This includes face to face time and non-face to face time preparing to see the patient (eg, review of tests), obtaining and/or reviewing separately obtained history, documenting clinical information in the electronic or other health record, independently interpreting results and communicating results to the patient/family/caregiver, or care coordinator.          Sabrina Leavitt M.D.

## 2022-11-15 NOTE — OP NOTE
Operative Report     Preoperative Diagnosis:   Multicentric Left Breast Cancer, Central and Upper Outer Quadrant     Postoperative Diagnosis:  Same     Procedures Performed:  Left Total Mastectomy  Left [place] Lymph Node Dissection  Injection of Isosulfan Blue Dye     Surgeon: Sabrina Leavitt MD     Anesthesia: General      Drains: 15 Czech Tian     Estimated Blood Loss: 25     Complications: None apparent     Disposition: Tho PACU in good condition.     Specimens:  Left Breast, Short Suture Superior, Long Suture Lateral  Left  Axillary [place] Lymph Nodes              No. 1 688, Hot and Blue   No. 2 Non sentinel node  Background count 5    [place] Node Biopsy for Breast Cancer Synoptic Operative Report  ACS Ifeanyi    Operation performed with curative intent: yes    Tracer used to identify sentinel nodes in the upfront surgery (non-neoadjuvant) setting: dye,  and radioactive tracer,     Tracer used to identify sentinel nodes in the neoadjuvant setting: N/A    All nodes (colored or non-colored) present at the end of a dye-filled lymphatic channel were removed: Yes    All significantly radioactive nodes were removed: Yes    All palpably suspicious nodes were removed: Yes    Biopsy-proven positive nodes marked with clips prior to chemotherapy were identified and removed: N/A      Statement of Medical Necessity:  This patient is a 82 year old woman who was recently diagnosed with multicentric left breast cancer.  After undergoing a thorough preoperative evaluation and considering all of her options, she has elected for total mastectomy.  The risks, benefits and alternatives to surgery have been discussed and she has provided informed consent.      Description of Operative Procedure and Findings:  The patient was identified and transported to the operating room and placed on the operating table.  All pressure points were padded.  General Anesthesia was administered.  After the skin was cleansed with an alcohol prep,  methylene blue dye was injected beneath the nipple-areolar complex and the breast was massaged for 5 minutes. The patient's left breast, axilla and upper arm was prepped and draped in the usual sterile fashion.  A time out was performed.    Attention was then turned to the left breast.  An elliptical incision was made about the nipple areolar complex using the 10 blade scalpel.  The dissection was continued through the subcutaneous tissue to the chest wall using the bovie.  Tumescent solution was instilled in the plane between the breast and the subcutaneous tissue.  The breast was then removed from the pectoralis muscle, including the pectoralis fascia with the specimen, using the bovie.  The breast was dissected from the overlysing skin and subcutaneous tissue using scissors.  The dissection extended to the inframammary fold inferiorly, the lateral border of the sternum medially, the clavicle superiorly and the anterior border of the latissimus laterally.  The inferior, medial, superior and lateral attachments were then taken using the bovie.  The specimen was oriented using a short suture superiorly and a long suture laterally and submitted to pathology for permanent evaluation.       Through the same incision, the clavipectoral fascia was opened vertically using the bovie.  1 sentinel lymph node and 1 non sentinel palpable node was identified, as described above.  This was submitted to pathology. Background count was 5. The wound was irrigated and suctioned dry. Hemostasis was obtained.  Fifty ccs of Exparel and 0.25% marcaine plain was injected about the entire cavity.  Two 15F robert drains were placed, one in the mastectomy cavity and one in the axilla.  The drains were sutured in place using 3-0 nylon sutures.  The incision was closed in 2 layers using multiple 3-0 vicryl interrupted deep dermal sutures and 4-0 monocryl running subcuticular sutures. Preneo dressings were applied.  The drains were dressed with  biopatches and a tegaderm.        Prior to closing, the instrument, needle and sponge count was reported to be correct.  No complications were noted.  I was present for and performed the entire operation.

## 2022-11-16 ENCOUNTER — TELEPHONE (OUTPATIENT)
Dept: SURGERY | Facility: CLINIC | Age: 82
End: 2022-11-16
Payer: MEDICARE

## 2022-11-16 VITALS
HEART RATE: 64 BPM | SYSTOLIC BLOOD PRESSURE: 128 MMHG | RESPIRATION RATE: 18 BRPM | DIASTOLIC BLOOD PRESSURE: 56 MMHG | HEIGHT: 62 IN | BODY MASS INDEX: 26.51 KG/M2 | WEIGHT: 144.06 LBS | TEMPERATURE: 98 F | OXYGEN SATURATION: 98 %

## 2022-11-16 PROCEDURE — 94799 UNLISTED PULMONARY SVC/PX: CPT

## 2022-11-16 RX ORDER — TRAMADOL HYDROCHLORIDE 50 MG/1
50 TABLET ORAL EVERY 6 HOURS PRN
Qty: 10 TABLET | Refills: 0 | Status: SHIPPED | OUTPATIENT
Start: 2022-11-16 | End: 2023-08-15

## 2022-11-16 RX ORDER — ONDANSETRON 8 MG/1
8 TABLET, ORALLY DISINTEGRATING ORAL EVERY 8 HOURS PRN
Qty: 10 TABLET | Refills: 0 | Status: SHIPPED | OUTPATIENT
Start: 2022-11-16

## 2022-11-16 NOTE — TELEPHONE ENCOUNTER
Spoke with patient's , Renan. Informed that I scheduled patient an appointment at the [facility] on 11/21  @11:20, for a 1wk post op appointment. Voiced understanding.

## 2022-11-16 NOTE — CARE UPDATE
Patient ambulated to  to void with standby assistance. Patient denies any pain/discomfort and has stated several times that she is ready to go home. Will continue discharge process.

## 2022-11-16 NOTE — NURSING
Assumed care of patient from [facility] [place]. Patient vitals stable and resting comfortably in bed with no complaints. Plan of care reviewed with patient and family. will continue to monitor

## 2022-11-16 NOTE — CARE UPDATE
Pt resting comfortably. PO fluids and food tolerated. HOB elevated. ANNIA drain emptied and provided education to spouse regarding drain care. Spouse at bedside. Respirations are even and unlabored. Pt calm and cooperative. Call light and personal items within reach.

## 2022-11-16 NOTE — CARE UPDATE
Assumed care of patient, patient resting quietly with no distress noted, AAOx3,  at bedside with no concerns voiced. Patient in good spirits and ready to be discharged. Safety measures in place, IVFs infusing as ordered, patient denies any pain/discomfort at this time. Will continue to monitor closely.

## 2022-11-16 NOTE — DISCHARGE SUMMARY
[facility]/Surg  Discharge Note  Short Stay    Procedure(s) (LRB):  MASTECTOMY, SIMPLE (Left)  BIOPSY, LYMPH NODE, SENTINEL (Left)      OUTCOME: Patient tolerated treatment/procedure well without complication and is now ready for discharge.    DISPOSITION: Home or Self Care    FINAL DIAGNOSIS:  Malignant neoplasm of central portion of left breast in female, estrogen receptor positive    FOLLOWUP: In clinic    DISCHARGE INSTRUCTIONS:    Discharge Procedure Orders   Diet Adult Regular     Notify your health care provider if you experience any of the following:  temperature >100.4     Notify your health care provider if you experience any of the following:  persistent nausea and vomiting or diarrhea     Notify your health care provider if you experience any of the following:  severe uncontrolled pain     Notify your health care provider if you experience any of the following:  redness, tenderness, or signs of infection (pain, swelling, redness, odor or green/yellow discharge around incision site)     No dressing needed     Activity as tolerated        TIME SPENT ON DISCHARGE: 15 minutes

## 2022-11-16 NOTE — PLAN OF CARE
Report given to ОЛЕГ Key.  Pt and pt belongings transferred to MS room 4 via hospital bed.  Pt's  at bedside.

## 2022-11-17 ENCOUNTER — DOCUMENT SCAN (OUTPATIENT)
Dept: HOME HEALTH SERVICES | Facility: HOSPITAL | Age: 82
End: 2022-11-17
Payer: MEDICARE

## 2022-11-21 ENCOUNTER — OFFICE VISIT (OUTPATIENT)
Dept: SURGERY | Facility: CLINIC | Age: 82
End: 2022-11-21
Payer: MEDICARE

## 2022-11-21 ENCOUNTER — TELEPHONE (OUTPATIENT)
Dept: SURGERY | Facility: CLINIC | Age: 82
End: 2022-11-21

## 2022-11-21 ENCOUNTER — DOCUMENTATION ONLY (OUTPATIENT)
Dept: HEMATOLOGY/ONCOLOGY | Facility: CLINIC | Age: 82
End: 2022-11-21
Payer: MEDICARE

## 2022-11-21 DIAGNOSIS — Z17.0 MALIGNANT NEOPLASM OF CENTRAL PORTION OF LEFT BREAST IN FEMALE, ESTROGEN RECEPTOR POSITIVE: Primary | ICD-10-CM

## 2022-11-21 DIAGNOSIS — C50.112 MALIGNANT NEOPLASM OF CENTRAL PORTION OF LEFT BREAST IN FEMALE, ESTROGEN RECEPTOR POSITIVE: Primary | ICD-10-CM

## 2022-11-21 PROCEDURE — 99999 PR PBB SHADOW E&M-EST. PATIENT-LVL II: ICD-10-PCS | Mod: PBBFAC,,, | Performed by: SURGERY

## 2022-11-21 PROCEDURE — 99999 PR PBB SHADOW E&M-EST. PATIENT-LVL II: CPT | Mod: PBBFAC,,, | Performed by: SURGERY

## 2022-11-21 PROCEDURE — 99024 PR POST-OP FOLLOW-UP VISIT: ICD-10-PCS | Mod: POP,,, | Performed by: SURGERY

## 2022-11-21 PROCEDURE — 99212 OFFICE O/P EST SF 10 MIN: CPT | Mod: PBBFAC | Performed by: SURGERY

## 2022-11-21 PROCEDURE — 99024 POSTOP FOLLOW-UP VISIT: CPT | Mod: POP,,, | Performed by: SURGERY

## 2022-11-21 NOTE — PROGRESS NOTES
JOSH met with patient and spouse on today in surgery clinic. ANTONIAR provided patient with $25 gas card to assist with transportation to and from treatment. SWER will remain available.

## 2022-11-21 NOTE — PROGRESS NOTES
Breast Surgical Oncology  Post-operative Visit      REFERRING PHYSICIAN:  No referring provider defined for this encounter.       Magui Jc MD    MEDICAL ONCOLOGIST:    Zain Milton M.D.   RADIATION ONCOLOGIST:   Rosa Carbajal M.D.    Date of Service: 11/21/2022    DIAGNOSIS:   This is a 82 y.o. 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    TREATMENT SUMMARY:   The patient is status post left total mastectomy and sentinel node biopsy on 11/15/22.  Final pathology is pending.     INTERVAL HISTORY:   Vanessa Vazquez comes in for a post-op check.  She denies fever, chills, chest pain or shortness of breath.  Her pain is well controlled.  ANNIA drain with 15/5 cc over 24 hours.    MEDICATIONS:     Current Outpatient Medications   Medication Sig Dispense Refill    aspirin (ECOTRIN) 81 MG EC tablet Take 81 mg by mouth once daily.      donepeziL (ARICEPT) 10 MG tablet TAKE ONE TABLET BY MOUTH ONE TIME DAILY (Patient taking differently: every evening.) 30 tablet 11    gatifloxacin 0.5 % Drop drops Apply 1 drop to eye 2 (two) times daily. Eyedrops to start one day before surgery 5 mL 2    ketorolac 0.5% (ACULAR) 0.5 % Drop Place 1 drop into the left eye 4 (four) times daily. Eyedrops to start one day before surgery 5 mL 1    moxifloxacin (VIGAMOX) 0.5 % ophthalmic solution Place 1 drop into the right eye every 12 (twelve) hours. Start eyedrops one day before surgery 5 mL 2    ondansetron (ZOFRAN-ODT) 8 MG TbDL Take 1 tablet (8 mg total) by mouth every 8 (eight) hours as needed (Nausea). 10 tablet 0    pravastatin (PRAVACHOL) 40 MG tablet TAKE ONE TABLET BY MOUTH ONE TIME DAILY 90 tablet 3    prednisoLONE acetate (PRED FORTE) 1 % DrpS Place 1 drop into the left eye 4 (four) times daily. Eyedrops to start one day before surgery 5 mL 1    traMADoL (ULTRAM) 50 mg tablet Take 1 tablet (50 mg total) by mouth every 6  (six) hours as needed for Pain. 10 tablet 0    vitamin D (VITAMIN D3) 1000 units Tab Take 1,000 Units by mouth once daily.       No current facility-administered medications for this visit.       ALLERGIES:   Review of patient's allergies indicates:  No Known Allergies    PHYSICAL EXAMINATION:   General:  This is a well appearing female with appropriate speech, affect and gait.     Breast: right absent, Incision clean, dry, and intact. ANNIA with serous output.     ASSESSMENT:   The patient has had an uneventful postoperative course.    PLAN:   1. Return in 2 week for a follow up office visit   2. ANNIA drains removed today  3. The patient is advised in continued exam of the breast chest wall and to report to this office sooner should she note any areas of abnormality or concern.   4. She has been instructed to meet with med onc and rad onc for discussion of adjuvant treatment recommendations  5. Will call with final pathology    Sabrina Leavitt M.D.

## 2022-11-30 LAB
COMMENT: NORMAL
FINAL PATHOLOGIC DIAGNOSIS: NORMAL
GROSS: NORMAL
Lab: NORMAL

## 2022-12-01 ENCOUNTER — PATIENT MESSAGE (OUTPATIENT)
Dept: SURGERY | Facility: CLINIC | Age: 82
End: 2022-12-01
Payer: MEDICARE

## 2022-12-02 ENCOUNTER — EXTERNAL HOME HEALTH (OUTPATIENT)
Dept: HOME HEALTH SERVICES | Facility: HOSPITAL | Age: 82
End: 2022-12-02
Payer: MEDICARE

## 2022-12-02 ENCOUNTER — DOCUMENTATION ONLY (OUTPATIENT)
Dept: HEMATOLOGY/ONCOLOGY | Facility: CLINIC | Age: 82
End: 2022-12-02
Payer: MEDICARE

## 2022-12-02 DIAGNOSIS — C50.112 MALIGNANT NEOPLASM OF CENTRAL PORTION OF LEFT BREAST IN FEMALE, ESTROGEN RECEPTOR POSITIVE: Primary | ICD-10-CM

## 2022-12-02 DIAGNOSIS — Z17.0 MALIGNANT NEOPLASM OF CENTRAL PORTION OF LEFT BREAST IN FEMALE, ESTROGEN RECEPTOR POSITIVE: Primary | ICD-10-CM

## 2022-12-02 NOTE — NURSING
1420pm: Oncotype requested per Dr. Milton in-basket msg. Oncotype requested. Path20 order placed and specimen checked in.   Oncology Navigation   Intake  Date of Diagnosis: 10/04/22  Cancer Type: Breast (left IDC and IMC)  Internal / External Referral: Internal (Dr. Sabrina Leavitt)  Date of Referral: 10/13/22  Initial Nurse Navigator Contact: 10/17/22  Referral to Initial Contact Timeline (days): 4  Date Worked: 12/02/22  Appointment Date: 10/25/22  Schedule to Appointment Timeline (days): 8  Multiple appointments: Yes     Treatment  Current Status: Staging work-up  Date Presented to Tumor Board: 10/20/22    Surgery: Planned  Surgical Oncologist: Dr. Sabrina Leavitt  Consult Date: 10/17/22    Medical Oncologist: Dr. Zain Milton  Consult Date: 10/25/22    Radiation Therapy: -- (Referral Placed 10/13/22)  Radiation Oncologist: Dr. Rosa Carbajal, III    Procedures: Biopsy  Biopsy Schedule Date: 10/04/22    Physical Therapy Referral Date: 10/13/22    ER: Positive (greater than 95%)  AK: Positive (greater than 95%)  Her2: Negative ((0))    Radiation Oncologist: Dr. Rosa Carbajal, III    Support Systems: Spouse/significant other  Concerns: Dementia     Acuity      Follow Up  No follow-ups on file.

## 2022-12-06 ENCOUNTER — CLINICAL SUPPORT (OUTPATIENT)
Dept: REHABILITATION | Facility: HOSPITAL | Age: 82
End: 2022-12-06
Payer: MEDICARE

## 2022-12-06 DIAGNOSIS — R26.89 DECREASED FUNCTIONAL MOBILITY: Primary | ICD-10-CM

## 2022-12-06 PROCEDURE — 97164 PT RE-EVAL EST PLAN CARE: CPT

## 2022-12-06 PROCEDURE — 97140 MANUAL THERAPY 1/> REGIONS: CPT

## 2022-12-06 NOTE — PLAN OF CARE
[facility]  Physical Therapy Re-Evaluation    Date: 12/6/2022   Name: Vanessa Vazquez  Clinic Number: [id number]    Therapy Diagnosis:    Encounter Diagnosis   Name Primary?    Decreased functional mobility Yes        Physician: Sabrina Leavitt MD     Physician Orders: PT Eval and Treat  Medical Diagnosis from Referral: Malignant neoplasm of central portion of left breast in female, estrogen receptor positive  Evaluation Date: 12/6/2022  Authorization Period Expiration: 10/13/2023  Plan of Care Expiration: 11/9/2022  Visit # / Visits authorized: 1/20 (+1 for evaluation)   FOTO: 1/3    Precautions: Standard, Immunosuppression, cancer, and dementia and osteopenia    Time In: 1000  Time Out: 1035  Total Billable Time (timed & untimed codes): 30 minutes    SUBJECTIVE   Pt has simple mastectomy on 11/15/2022 and states she is feeling great. She moves her arms around with no limitation. Walking twice per day for exercise.     Pain: none     Prior Therapy: N/A  Nutrition:  Normal  Social History: Pt lives with their spouse  Occupation: Pt is retired  Exercise routine prior to onset : none  Current Level of Function: independent and pain free with all ADL, IADL, community mobility and functional activities.     Dominant Extremity: right    Pts goals: establish goals and discuss post-operative treatment     Surgical History:  Vanessa Vazquez  has a past surgical history that includes Breast biopsy (Left, 05/2018); Cataract extraction, extracapsular w/ intraocular lens implant; Simple mastectomy (Left, 11/15/2022); and [facility] lymph node biopsy (Left, 11/15/2022).    Medications:  Vanessa has a current medication list which includes the following prescription(s): aspirin, donepezil, gatifloxacin, ketorolac 0.5%, moxifloxacin, ondansetron, pravastatin, prednisolone acetate, tramadol, and vitamin d.    Allergies:  Review of patient's allergies indicates:  No Known Allergies     Other Past Medical  History:   Past Medical History:   Diagnosis Date    Cancer     Breast Cancer    CKD (chronic kidney disease), stage III     Dementia     Fibrocystic breast disease     Hearing loss     Hyperlipidemia     Osteoarthritis of thumb     Osteopenia     Stroke     Incidental finding in 2012.                     OBJECTIVE   Mental status: Alert, oriented, attentive      Shoulder AROM/PROM Right  11/2/2022 Left  11/2/2022 Pain/Dysfunction with Movement Goal   Flexion  135 126       Abduction  175 175       Extension 60 60       Functional IR Contralateral scapula Contralateral scapula       Functional ER T3 T3             STRENGTH:     U/E MMT Right Left Pain/Dysfunction with Movement Goal   Shoulder Flexion 4-/5 4-/5       Shoulder Extension (seated) 4-/5 4-/5       Shoulder Abduction 4-/5 4-/5       Shoulder IR (seated) 4-/5 4-/5       Shoulder ER (seated)    4-/5 4-/5       Elbow Flexion (seated) 4/5 4/5       Elbow Extension (seated) 4/5 4/5           Baseline Measurements of BL UE's for early detection of Lymphedema: (measured pre-op)     Palpation: no tenderness to palpation noted across chest scar. Moderate limitations in chest scar mobility in all directions     TREATMENT         MANUAL THERAPY TECHNIQUES were applied for (10) minutes, including:    Manual Intervention Performed Today    Soft Tissue Mobilization [x] L chest scar mobilization and HEP education    Joint Mobilizations []     []     []    Functional Dry Needling  []      Plan for Next Visit: Continue as needed          Home Exercises and Patient Education Provided    Education/Self-Care provided: (0) minutes  Pt educated on role of therapy in multi - disciplinary team, goals for therapy  Pt was educated in lymphedema etiology and management plans.    Pt was provided with written risk reductions and precautions for managing lymphedema.   Reviewed ANNIA drain care instructions.   ROM/lifting Precautions post surgery discussed -  until drains have been  "removed:  do not lift affected arm above 90 degrees of shoulder flexion  do not lift over 5 lbs  do not pull or push heavy objects  do not sleep on your stomach or surgery side   Patient was educated on all the above exercise prior/during/after for proper posture, positioning, and execution for safe performance with home exercise program.        ASSESSMENT   Vanessa is responding well following recent simple mastectomy. She demonstrates a very mild limitation in shoulder flexion but has good strength which matches her pre-operative measurements. She has some limitations in scar mobility. Pt educated on self scar massage and demonstrates good understanding. Pt will return for one appointment per week and will re-assess scar mobility session to session      Plan of care discussed with patient: Yes  Pt's spiritual, cultural and educational needs considered and patient is agreeable to the plan of care and goals as stated below:     Anticipated Barriers for therapy: lack of understanding of condition and dementia    Medical Necessity is demonstrated by the following  History  Co-morbidities and personal factors that may impact the plan of care Co-morbidities:   advanced age, CKD stage III, and dementia and osteopenia    Personal Factors:   age     high   Examination  Body Structures and Functions, activity limitations and participation restrictions that may impact the plan of care Body Regions:   upper extremities    Body Systems:    none    Participation Restrictions:   See above in "Current Level of Function"     Activity limitations:   Learning and applying knowledge  no deficits    General Tasks and Commands  no deficits    Communication  no deficits    Mobility  no deficits    Self care  no deficits    Domestic Life  no deficits    Interactions/Relationships  no deficits    Life Areas  no deficits    Community and Social Life  no deficits         low   Clinical Presentation stable and uncomplicated low   Decision " Making/ Complexity Score: low       PLAN   Plan of care Certification: 12/6/2022 to 1/6/2022.    Outpatient Physical Therapy 1 times weekly for 2 weeks to include any combination of the following interventions: virtual visits, dry needling, modalities, electrical stimulation (IFC, Pre-Mod, Attended with Functional Dry Needling), Cervical/Lumbar Traction, Manual Therapy, Neuromuscular Re-ed, Patient Education, Self Care, Therapeutic Exercise, and Therapeutic Activites     Thank you for this referral.    These services are reasonable and necessary for the conditions set forth above while under my care.    Priti Joseph, PT, DPT

## 2022-12-08 ENCOUNTER — PATIENT OUTREACH (OUTPATIENT)
Dept: SURGERY | Facility: CLINIC | Age: 82
End: 2022-12-08

## 2022-12-08 ENCOUNTER — TUMOR BOARD CONFERENCE (OUTPATIENT)
Dept: HEMATOLOGY/ONCOLOGY | Facility: CLINIC | Age: 82
End: 2022-12-08
Payer: MEDICARE

## 2022-12-08 ENCOUNTER — OFFICE VISIT (OUTPATIENT)
Dept: SURGERY | Facility: CLINIC | Age: 82
End: 2022-12-08
Payer: MEDICARE

## 2022-12-08 DIAGNOSIS — Z17.0 MALIGNANT NEOPLASM OF CENTRAL PORTION OF LEFT BREAST IN FEMALE, ESTROGEN RECEPTOR POSITIVE: Primary | ICD-10-CM

## 2022-12-08 DIAGNOSIS — C50.112 MALIGNANT NEOPLASM OF CENTRAL PORTION OF LEFT BREAST IN FEMALE, ESTROGEN RECEPTOR POSITIVE: Primary | ICD-10-CM

## 2022-12-08 PROCEDURE — 99358 PR PROLONGED SERV,NO CONTACT,1ST HR: ICD-10-PCS | Mod: S$PBB,,, | Performed by: SURGERY

## 2022-12-08 PROCEDURE — 99999 PR PBB SHADOW E&M-EST. PATIENT-LVL II: CPT | Mod: PBBFAC,,, | Performed by: SURGERY

## 2022-12-08 PROCEDURE — 99212 OFFICE O/P EST SF 10 MIN: CPT | Mod: PBBFAC | Performed by: SURGERY

## 2022-12-08 PROCEDURE — 99024 PR POST-OP FOLLOW-UP VISIT: ICD-10-PCS | Mod: POP,,, | Performed by: SURGERY

## 2022-12-08 PROCEDURE — 99024 POSTOP FOLLOW-UP VISIT: CPT | Mod: POP,,, | Performed by: SURGERY

## 2022-12-08 PROCEDURE — 99358 PROLONG SERVICE W/O CONTACT: CPT | Mod: S$PBB,,, | Performed by: SURGERY

## 2022-12-08 PROCEDURE — 99999 PR PBB SHADOW E&M-EST. PATIENT-LVL II: ICD-10-PCS | Mod: PBBFAC,,, | Performed by: SURGERY

## 2022-12-08 NOTE — PROGRESS NOTES
Breast Surgical Oncology  Post-operative Visit      REFERRING PHYSICIAN:  No referring provider defined for this encounter.       Magui Jc MD    MEDICAL ONCOLOGIST:    Zain Milton M.D.   RADIATION ONCOLOGIST:   Rosa Carbajal M.D.    Date of Service: 12/08/2022    DIAGNOSIS:   This is a 82 y.o. 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    TREATMENT SUMMARY:   The patient is status post left total mastectomy and sentinel node biopsy on 11/15/22.  Final pathology revealed T3 tumor (not multicentric disease) with 1/2 LN positive. 2 mm of diseased without DOUGIE.     INTERVAL HISTORY:   Vanessa Vazquez comes in for a post-op check.  She denies fever, chills, chest pain or shortness of breath.  Her pain is well controlled.      MEDICATIONS:     Current Outpatient Medications   Medication Sig Dispense Refill    aspirin (ECOTRIN) 81 MG EC tablet Take 81 mg by mouth once daily.      donepeziL (ARICEPT) 10 MG tablet TAKE ONE TABLET BY MOUTH ONE TIME DAILY (Patient taking differently: every evening.) 30 tablet 11    gatifloxacin 0.5 % Drop drops Apply 1 drop to eye 2 (two) times daily. Eyedrops to start one day before surgery 5 mL 2    ketorolac 0.5% (ACULAR) 0.5 % Drop Place 1 drop into the left eye 4 (four) times daily. Eyedrops to start one day before surgery 5 mL 1    moxifloxacin (VIGAMOX) 0.5 % ophthalmic solution Place 1 drop into the right eye every 12 (twelve) hours. Start eyedrops one day before surgery 5 mL 2    ondansetron (ZOFRAN-ODT) 8 MG TbDL Take 1 tablet (8 mg total) by mouth every 8 (eight) hours as needed (Nausea). 10 tablet 0    pravastatin (PRAVACHOL) 40 MG tablet TAKE ONE TABLET BY MOUTH ONE TIME DAILY 90 tablet 3    prednisoLONE acetate (PRED FORTE) 1 % DrpS Place 1 drop into the left eye 4 (four) times daily. Eyedrops to start one day before surgery 5 mL 1    traMADoL (ULTRAM) 50 mg tablet Take 1  tablet (50 mg total) by mouth every 6 (six) hours as needed for Pain. 10 tablet 0    vitamin D (VITAMIN D3) 1000 units Tab Take 1,000 Units by mouth once daily.       No current facility-administered medications for this visit.       ALLERGIES:   Review of patient's allergies indicates:  No Known Allergies    PHYSICAL EXAMINATION:   General:  This is a well appearing female with appropriate speech, affect and gait.     Breast: right absent, Incision clean, dry, and intact.     ASSESSMENT:   The patient has had an uneventful postoperative course.    PLAN:   1. Return in 1 month for a follow up office visit and clinical exam  2. To be discussed today at [facility] for adjuvant recommendations  3. The patient is advised in continued exam of the breast chest wall and to report to this office sooner should she note any areas of abnormality or concern.   4. She has been instructed to meet with med onc and rad onc for discussion of adjuvant treatment recommendations  5. Resume PT  6. Rx for prosthesis given    Sabrina Leavitt M.D.

## 2022-12-08 NOTE — PROGRESS NOTES
Tumor Board Documentation      Vanessa Vazquez was presented by Sabrina Leavitt MD at our Tumor Board on 12/8/2022, which included representatives from (P) Medical Oncology, Navigation, Hematology, Surgical Oncology, Pathology, Genetics.    Vanessa currently presents as (P) a current patient with (P) Breast cancer (IDC), with history of the following treatments: (P) Surgical Intervention(s).    Additionally, we reviewed previous medical and familial history, history of present illness, and recent lab results along with all available histopathologic and imaging studies. The tumor board considered available treatment options and made the following recommendations:      Await Oncotype results and follow-up with [facility] to discuss post-mastectomy radiation    The following procedures/referrals were also placed: No orders of the defined types were placed in this encounter.      Clinical Trial Status: (P) Not discussed     National site-specific guidelines were discussed with respect to the case.    Tumor board is a meeting of clinicians from various specialty areas who evaluate and discuss patients for whom a multidisciplinary approach is being considered. Final determinations in the plan of care are those of the provider(s). The responsibility for follow up of recommendations given during tumor board is that of the provider.     Sabrina Leavitt MD

## 2022-12-13 ENCOUNTER — OFFICE VISIT (OUTPATIENT)
Dept: HEMATOLOGY/ONCOLOGY | Facility: CLINIC | Age: 82
End: 2022-12-13
Payer: MEDICARE

## 2022-12-13 VITALS
HEART RATE: 65 BPM | TEMPERATURE: 97 F | HEIGHT: 62 IN | WEIGHT: 141.75 LBS | SYSTOLIC BLOOD PRESSURE: 130 MMHG | BODY MASS INDEX: 26.09 KG/M2 | DIASTOLIC BLOOD PRESSURE: 63 MMHG | OXYGEN SATURATION: 98 %

## 2022-12-13 DIAGNOSIS — E78.1 HYPERTRIGLYCERIDEMIA: Chronic | ICD-10-CM

## 2022-12-13 DIAGNOSIS — Z79.899 IMMUNODEFICIENCY DUE TO CHEMOTHERAPY: ICD-10-CM

## 2022-12-13 DIAGNOSIS — T45.1X5A IMMUNODEFICIENCY DUE TO CHEMOTHERAPY: ICD-10-CM

## 2022-12-13 DIAGNOSIS — Z86.73 HISTORY OF CVA (CEREBROVASCULAR ACCIDENT): ICD-10-CM

## 2022-12-13 DIAGNOSIS — D84.821 IMMUNODEFICIENCY DUE TO CHEMOTHERAPY: ICD-10-CM

## 2022-12-13 DIAGNOSIS — F03.90 DEMENTIA WITHOUT BEHAVIORAL DISTURBANCE: Chronic | ICD-10-CM

## 2022-12-13 DIAGNOSIS — Z17.0 MALIGNANT NEOPLASM OF CENTRAL PORTION OF LEFT BREAST IN FEMALE, ESTROGEN RECEPTOR POSITIVE: Primary | ICD-10-CM

## 2022-12-13 DIAGNOSIS — M85.859 OSTEOPENIA OF HIP, UNSPECIFIED LATERALITY: ICD-10-CM

## 2022-12-13 DIAGNOSIS — C50.112 MALIGNANT NEOPLASM OF CENTRAL PORTION OF LEFT BREAST IN FEMALE, ESTROGEN RECEPTOR POSITIVE: Primary | ICD-10-CM

## 2022-12-13 PROBLEM — Z79.69 IMMUNODEFICIENCY DUE TO CHEMOTHERAPY: Status: ACTIVE | Noted: 2022-12-13

## 2022-12-13 PROCEDURE — 99999 PR PBB SHADOW E&M-EST. PATIENT-LVL IV: CPT | Mod: PBBFAC,,, | Performed by: INTERNAL MEDICINE

## 2022-12-13 PROCEDURE — 99215 PR OFFICE/OUTPT VISIT, EST, LEVL V, 40-54 MIN: ICD-10-PCS | Mod: S$PBB,,, | Performed by: INTERNAL MEDICINE

## 2022-12-13 PROCEDURE — 99999 PR PBB SHADOW E&M-EST. PATIENT-LVL IV: ICD-10-PCS | Mod: PBBFAC,,, | Performed by: INTERNAL MEDICINE

## 2022-12-13 PROCEDURE — 99214 OFFICE O/P EST MOD 30 MIN: CPT | Mod: PBBFAC | Performed by: INTERNAL MEDICINE

## 2022-12-13 PROCEDURE — 99215 OFFICE O/P EST HI 40 MIN: CPT | Mod: S$PBB,,, | Performed by: INTERNAL MEDICINE

## 2022-12-13 RX ORDER — ANASTROZOLE 1 MG/1
1 TABLET ORAL DAILY
Qty: 90 TABLET | Refills: 3 | Status: SHIPPED | OUTPATIENT
Start: 2022-12-13 | End: 2023-01-11 | Stop reason: SDUPTHER

## 2022-12-13 NOTE — PROGRESS NOTES
Subjective:       Patient ID: Vanessa Vazquez is a 82 y.o. female.    Chief Complaint: Results and Breast Cancer    HPI:  82-year-old female status post mastectomy with 1 node positive.  Oncotype recurrence score pending patient returns with  for review of treatment options ECOG status 2    Past Medical History:   Diagnosis Date    Cancer     Breast Cancer    CKD (chronic kidney disease), stage III     Dementia     Fibrocystic breast disease     Hearing loss     Hyperlipidemia     Osteoarthritis of thumb     Osteopenia     Stroke     Incidental finding in 2012.     Family History   Problem Relation Age of Onset    Glaucoma Brother     Hyperlipidemia Mother     Heart disease Father     Osteoporosis Cousin     Coronary artery disease Maternal Uncle      Social History     Socioeconomic History    Marital status:    Tobacco Use    Smoking status: Never    Smokeless tobacco: Never   Substance and Sexual Activity    Alcohol use: No    Drug use: No    Sexual activity: Not Currently   Social History Narrative    The patient is  and the mother of 2 adult children. She retired from [employer] as .  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  She drives occasionally.     Social Determinants of Health     Financial Resource Strain: Low Risk     Difficulty of Paying Living Expenses: Not hard at all   Food Insecurity: No Food Insecurity    Worried About Running Out of Food in the Last Year: Never true    Ran Out of Food in the Last Year: Never true   Transportation Needs: Unknown    Lack of Transportation (Medical): No   Physical Activity: Sufficiently Active    Days of Exercise per Week: 7 days    Minutes of Exercise per Session: 40 min   Stress: No Stress Concern Present    Feeling of Stress : Not at all   Social Connections: Socially Integrated    Frequency of Communication with Friends and  Family: More than three times a week    Frequency of Social Gatherings with Friends and Family: More than three times a week    Attends Uatsdin Services: More than 4 times per year    Active Member of Clubs or Organizations: No    Attends Club or Organization Meetings: More than 4 times per year    Marital Status:    Housing Stability: Unknown    Unable to Pay for Housing in the Last Year: No    Unstable Housing in the Last Year: No     Past Surgical History:   Procedure Laterality Date    BREAST BIOPSY Left 05/2018    Benign    CATARACT EXTRACTION EXTRACAPSULAR W/ INTRAOCULAR LENS IMPLANTATION      SENTINEL LYMPH NODE BIOPSY Left 11/15/2022    Procedure: BIOPSY, LYMPH NODE, SENTINEL;  Surgeon: Sabrina Leavitt MD;  Location: [facility];  Service: General;  Laterality: Left;    SIMPLE MASTECTOMY Left 11/15/2022    Procedure: MASTECTOMY, SIMPLE;  Surgeon: Sabrina Leavitt MD;  Location: [facility];  Service: General;  Laterality: Left;       Labs:  Lab Results   Component Value Date    WBC 7.70 11/09/2022    HGB 13.0 11/09/2022    HCT 38.0 11/09/2022    MCV 88 11/09/2022     11/09/2022     BMP  Lab Results   Component Value Date     10/14/2022    K 4.1 10/14/2022     10/14/2022    CO2 21 (L) 10/14/2022    BUN 15 10/14/2022    CREATININE 1.1 10/14/2022    CALCIUM 9.6 10/14/2022    ANIONGAP 13 10/14/2022    ESTGFRAFRICA >60.0 09/03/2021    EGFRNONAA >60.0 09/03/2021     Lab Results   Component Value Date    ALT 21 10/14/2022    AST 18 10/14/2022    ALKPHOS 63 10/14/2022    BILITOT 0.5 10/14/2022       No results found for: IRON, TIBC, FERRITIN, SATURATEDIRO  Lab Results   Component Value Date    BZGWZUQF59 474 07/24/2012     No results found for: FOLATE  Lab Results   Component Value Date    TSH 2.489 08/12/2022         Review of Systems   Constitutional:  Negative for activity change, appetite change, chills, diaphoresis, fatigue, fever and unexpected weight change.   HENT:   Negative for congestion, dental problem, drooling, ear discharge, ear pain, facial swelling, hearing loss, mouth sores, nosebleeds, postnasal drip, rhinorrhea, sinus pressure, sneezing, sore throat, tinnitus, trouble swallowing and voice change.    Eyes:  Negative for photophobia, pain, discharge, redness, itching and visual disturbance.   Respiratory:  Negative for cough, choking, chest tightness, shortness of breath, wheezing and stridor.    Cardiovascular:  Negative for chest pain, palpitations and leg swelling.   Gastrointestinal:  Negative for abdominal distention, abdominal pain, anal bleeding, blood in stool, constipation, diarrhea, nausea, rectal pain and vomiting.   Endocrine: Negative for cold intolerance, heat intolerance, polydipsia, polyphagia and polyuria.   Genitourinary:  Negative for decreased urine volume, difficulty urinating, dyspareunia, dysuria, enuresis, flank pain, frequency, genital sores, hematuria, menstrual problem, pelvic pain, urgency, vaginal bleeding, vaginal discharge and vaginal pain.   Musculoskeletal:  Negative for arthralgias, back pain, gait problem, joint swelling, myalgias, neck pain and neck stiffness.   Skin:  Negative for color change, pallor and rash.   Allergic/Immunologic: Negative for environmental allergies, food allergies and immunocompromised state.   Neurological:  Positive for weakness. Negative for dizziness, tremors, seizures, syncope, facial asymmetry, speech difficulty, light-headedness, numbness and headaches.   Hematological:  Negative for adenopathy. Does not bruise/bleed easily.   Psychiatric/Behavioral:  Positive for confusion, decreased concentration and dysphoric mood. Negative for agitation, behavioral problems, hallucinations, self-injury, sleep disturbance and suicidal ideas. The patient is not nervous/anxious and is not hyperactive.      Objective:      Physical Exam  Vitals reviewed.   Constitutional:       General: She is not in acute distress.      Appearance: She is well-developed. She is not diaphoretic.   HENT:      Head: Normocephalic and atraumatic.      Right Ear: External ear normal.      Left Ear: External ear normal.      Nose: Nose normal.      Right Sinus: No maxillary sinus tenderness or frontal sinus tenderness.      Left Sinus: No maxillary sinus tenderness or frontal sinus tenderness.      Mouth/Throat:      Pharynx: No oropharyngeal exudate.   Eyes:      General: Lids are normal. No scleral icterus.        Right eye: No discharge.         Left eye: No discharge.      Conjunctiva/sclera: Conjunctivae normal.      Right eye: Right conjunctiva is not injected. No hemorrhage.     Left eye: Left conjunctiva is not injected. No hemorrhage.     Pupils: Pupils are equal, round, and reactive to light.   Neck:      Thyroid: No thyromegaly.      Vascular: No JVD.      Trachea: No tracheal deviation.   Cardiovascular:      Rate and Rhythm: Normal rate.   Pulmonary:      Effort: Pulmonary effort is normal. No respiratory distress.      Breath sounds: No stridor.   Chest:      Chest wall: No tenderness.   Abdominal:      General: Bowel sounds are normal. There is no distension.      Palpations: Abdomen is soft. There is no hepatomegaly, splenomegaly or mass.      Tenderness: There is no abdominal tenderness. There is no rebound.   Musculoskeletal:         General: No tenderness. Normal range of motion.      Cervical back: Normal range of motion and neck supple.   Lymphadenopathy:      Cervical: No cervical adenopathy.      Upper Body:      Right upper body: No supraclavicular adenopathy.      Left upper body: No supraclavicular adenopathy.   Skin:     General: Skin is dry.      Findings: No erythema or rash.   Neurological:      Mental Status: She is alert and oriented to person, place, and time.      Cranial Nerves: No cranial nerve deficit.      Coordination: Coordination normal.   Psychiatric:         Behavior: Behavior normal.         Thought Content:  Thought content normal.         Judgment: Judgment normal.           Assessment:      1. Malignant neoplasm of central portion of left breast in female, estrogen receptor positive    2. Dementia without behavioral disturbance    3. Immunodeficiency due to chemotherapy    4. Osteopenia of hip, unspecified laterality    5. History of CVA (cerebrovascular accident)    6. Hypertriglyceridemia           Plan:     Reviewed information with patient as well as  article from up-to-date on Arimidex given to them.  Await results of Oncotype testing high likelihood will treat Arimidex 1 mg p.o. q.day for 5 years completion in January 2028.  At this time case placed through the Oncology pathways awaiting for rhonchi Oncotype testing will prognostic significance. .  I have also made referral for Radiation Oncology with node positive to see if postoperative radiation is warranted would recommend 3 month follow-up follow-up with nurse practitioner/AP P to review toxicity of medicine and to see whether not Prolia should be added history of osteopenia noted previously on bone scan.  Discussed implications answered questions will followed results of Oncotype testing when completed to patient total time 40 minutes      Med Onc Chart Routing      Follow up with physician    Follow up with ARNIE 3 months. If polyp patient is tolerating Arimidex without problems would recommend that patient be considered for Prolia therapy   Infusion scheduling note    Injection scheduling note    Labs    Imaging    Pharmacy appointment    Other referrals           Zain Milton Jr, MD FACP

## 2022-12-14 ENCOUNTER — CLINICAL SUPPORT (OUTPATIENT)
Dept: REHABILITATION | Facility: HOSPITAL | Age: 82
End: 2022-12-14
Payer: MEDICARE

## 2022-12-14 DIAGNOSIS — R26.89 DECREASED FUNCTIONAL MOBILITY: Primary | ICD-10-CM

## 2022-12-14 PROCEDURE — 97140 MANUAL THERAPY 1/> REGIONS: CPT

## 2022-12-14 NOTE — PROGRESS NOTES
[facility]   Physical Therapy Treatment Note     Name: Vanessa Vazquez  Clinic Number: [id number]    Therapy Diagnosis:   Encounter Diagnosis   Name Primary?    Decreased functional mobility Yes     Physician: Sabrina Leavitt MD    Visit Date: 12/14/2022    Physician Orders: PT Eval and Treat  Medical Diagnosis from Referral: Malignant neoplasm of central portion of left breast in female, estrogen receptor positive  Evaluation Date: 12/6/2022  Authorization Period Expiration: 10/13/2023  Plan of Care Expiration: 11/9/2022  Visit # / Visits authorized: 2/20 (+1 for evaluation)   FOTO: 1/3     Precautions: Standard, Immunosuppression, cancer, and dementia and osteopenia       Time In: 0900  Time Out: 0938  Total Billable Time: 38 minutes (Billing reflects 1 on 1 treatment time spent with patient)     SUBJECTIVE     Patient reports: she is feeling great today. Her  states that he has been working on her scar twice daily     He/She was compliant with home exercise program.  Response to previous treatment: she felt fine with no significant changes   Functional change: improved mobility and smoothness of chest scar    Pain: 0/10     Location: L chest     OBJECTIVE     Objective Measures updated at progress report only unless specified.       TREATMENT     Vanessa received the treatments listed below:     MANUAL THERAPY TECHNIQUES were applied for (38) minutes, including:    Manual Intervention Performed Today    Soft Tissue Mobilization [x] left scar mobilizations with arm at side and overhead    Joint Mobilizations []    Cleaning at incision site  [x] Removal of glue and cleaning at incision site to improve hygiene and decrease skin irritation     []    Functional Dry Needling  []      Plan for Next Visit: Continue as needed         PATIENT EDUCATION AND HOME EXERCISES     Home Exercises Provided and Patient Education Provided     Education provided: (time included with manual)    PURPOSE: Patient educated on the impairments noted above and the effects of physical therapy intervention to improve overall condition and QOL.   Educated on continued scar mobilizations with arm at side and overhead in order to improve scar appearance. Scar is not affecting mobility or function    Written Home Exercises Provided: yes.  Exercises were reviewed and Vanessa was able to demonstrate them prior to the end of the session.  Vanessa demonstrated good  understanding of the education provided. See EMR under Patient Instructions for exercises provided during therapy sessions.    ASSESSMENT     Pt tolerated session well today. Again focused on scar mobility which has improved significantly since previous session. Scar mobility does not affect patients ROM, strength or function and therefore she is appropriate for discharge. Her  plans to continue scar mobilizations at home for a little while to improve appearance of scar.     Vanessa is progressing well towards her goals.   Pt prognosis is Excellent.     Pt will continue to benefit from skilled outpatient physical therapy to address the deficits listed in the problem list box on initial evaluation, provide pt/family education and to maximize pt's level of independence in the home and community environment.     Pt's spiritual, cultural and educational needs considered and pt agreeable to plan of care and goals.     Anticipated Barriers for therapy: lack of understanding of condition and dementia          PLAN     Patient discharged from physical therapy       Priti Joseph, PT

## 2022-12-19 ENCOUNTER — PATIENT MESSAGE (OUTPATIENT)
Dept: HEMATOLOGY/ONCOLOGY | Facility: CLINIC | Age: 82
End: 2022-12-19
Payer: MEDICARE

## 2023-01-03 ENCOUNTER — HOSPITAL ENCOUNTER (OUTPATIENT)
Dept: RADIATION THERAPY | Facility: HOSPITAL | Age: 83
Discharge: HOME | End: 2023-01-03
Attending: RADIOLOGY
Payer: MEDICARE

## 2023-01-03 ENCOUNTER — DOCUMENTATION ONLY (OUTPATIENT)
Dept: HEMATOLOGY/ONCOLOGY | Facility: CLINIC | Age: 83
End: 2023-01-03
Payer: MEDICARE

## 2023-01-03 NOTE — NURSING
0930am: Incoming Oncotype Dx score results report scanned into media and updated in Oncology hx. Dr. Milton, Dr. Carbajal, and Dr. Leavitt notified via in-basket msg.   Oncology Navigation   Intake  Date of Diagnosis: 10/04/22  Cancer Type: Breast (left IDC and IMC)  Internal / External Referral: Internal (Dr. Sabrina Leavitt)  Date of Referral: 10/13/22  Initial Nurse Navigator Contact: 10/17/22  Referral to Initial Contact Timeline (days): 4  Date Worked: 01/03/23  Appointment Date: 10/25/22  Schedule to Appointment Timeline (days): 8  Multiple appointments: Yes     Treatment  Current Status: Staging work-up  Date Presented to Tumor Board: 12/08/22    Surgery: Planned  Surgical Oncologist: Dr. Sabrina Leavitt  Consult Date: 10/17/22    Medical Oncologist: Dr. Zain Milton  Consult Date: 10/25/22    Radiation Therapy: -- (Referral Placed 10/13/22)  Radiation Oncologist: Dr. Rosa Carbajal, III    Procedures: Genomic testing  Biopsy Schedule Date: 10/04/22  Genomic Testing Date Sent: 12/02/22    Physical Therapy Referral Date: 10/13/22    ER: Positive (greater than 95%)  HI: Positive (greater than 95%)  Her2: Negative ((0))    Radiation Oncologist: Dr. Rosa Carbajal, III    Support Systems: Spouse/significant other  Concerns: Dementia     Acuity      Follow Up  No follow-ups on file.

## 2023-01-05 ENCOUNTER — OFFICE VISIT (OUTPATIENT)
Dept: RADIATION ONCOLOGY | Facility: CLINIC | Age: 83
End: 2023-01-05
Payer: MEDICARE

## 2023-01-05 VITALS — BODY MASS INDEX: 26.37 KG/M2 | WEIGHT: 143.31 LBS | RESPIRATION RATE: 18 BRPM | HEIGHT: 62 IN

## 2023-01-05 DIAGNOSIS — C50.112 MALIGNANT NEOPLASM OF CENTRAL PORTION OF LEFT BREAST IN FEMALE, ESTROGEN RECEPTOR POSITIVE: Primary | ICD-10-CM

## 2023-01-05 DIAGNOSIS — Z17.0 MALIGNANT NEOPLASM OF CENTRAL PORTION OF LEFT BREAST IN FEMALE, ESTROGEN RECEPTOR POSITIVE: Primary | ICD-10-CM

## 2023-01-05 PROCEDURE — 99999 PR PBB SHADOW E&M-EST. PATIENT-LVL III: CPT | Mod: PBBFAC,,, | Performed by: RADIOLOGY

## 2023-01-05 PROCEDURE — 99213 OFFICE O/P EST LOW 20 MIN: CPT | Mod: PBBFAC | Performed by: RADIOLOGY

## 2023-01-05 PROCEDURE — 99999 PR PBB SHADOW E&M-EST. PATIENT-LVL III: ICD-10-PCS | Mod: PBBFAC,,, | Performed by: RADIOLOGY

## 2023-01-05 PROCEDURE — 99204 OFFICE O/P NEW MOD 45 MIN: CPT | Mod: S$PBB,,, | Performed by: RADIOLOGY

## 2023-01-05 PROCEDURE — 99204 PR OFFICE/OUTPT VISIT, NEW, LEVL IV, 45-59 MIN: ICD-10-PCS | Mod: S$PBB,,, | Performed by: RADIOLOGY

## 2023-01-05 NOTE — PROGRESS NOTES
[facility]  RADIATION ONCOLOGY CONSULTATION    Name: Vanessa Vazquez  : 1940      Patient Referred To Radiation Oncology By:  Dr. Sabrian Leavitt MD  [id number] [place] [address]    DIAGNOSIS: L breast IDC, grade 1, pT3N1a(sn)cM0, ER >95%, NH >95%, Her2 negative    HISTORY OF PRESENT ILLNESS:  Vanessa Vazquez is a 82 y.o. female who presents for consultation for the above diagnosis.   MMG/US showed 1.7cm mass in L breast   Biopsy pathology showed IDC, grade 1, ER >95%, NH >95%, Her2 negative    She had mastectomy 11/15/22 by Dr. Leavitt  Pathology showed IDC, grade 1, 5cm, negative for LVI  1/2 nodes positive for carcinoma, 2mm    Today, healing well from surgery. Good rom oF LUE  She denies breast pain, skin changes, nipple changes, new axillary/supraclavicular masses, discharge, induration, or erythema.     REVIEW OF SYSTEMS: (Positive findings bold, otherwise negative)   Constitutional: fever, fatigue, weight change  Eyes: blurred vision in the past 3 months, double vision   ENT: ear pain, new mouth lesions, jaw pain, difficulty swallowing, sore throat  Cardiovascular: chest pain on exertion, reflux, leg swelling  Respiratory: shortness of breath, dyspnea, cough, hemoptysis.   GI: abdominal pain, diarrhea, constipation, blood in stool, painful bowel movements  : painful or burning urination, blood in urine  Musculoskeletal: new bone or joint pains  Neurologic: headache, seizure, focal numbness or tingling, balance changes, speech changes  Lymph: new or enlarged lymph nodes  Psychiatric: depression, anxiety    PRIOR RADIATION HISTORY: none    PAST MEDICAL HISTORY:  Past Medical History:   Diagnosis Date    Cancer     Breast Cancer    CKD (chronic kidney disease), stage III     Dementia     Fibrocystic breast disease     Hearing loss     Hyperlipidemia     Osteoarthritis of thumb     Osteopenia     Stroke     Incidental finding in .       PAST SURGICAL  HISTORY:  Past Surgical History:   Procedure Laterality Date    BREAST BIOPSY Left 05/2018    Benign    CATARACT EXTRACTION EXTRACAPSULAR W/ INTRAOCULAR LENS IMPLANTATION      SENTINEL LYMPH NODE BIOPSY Left 11/15/2022    Procedure: BIOPSY, LYMPH NODE, SENTINEL;  Surgeon: Sabrina Leavitt MD;  Location: [facility];  Service: General;  Laterality: Left;    SIMPLE MASTECTOMY Left 11/15/2022    Procedure: MASTECTOMY, SIMPLE;  Surgeon: Sabrina Leavitt MD;  Location: [facility];  Service: General;  Laterality: Left;       ALLERGIES:   Review of patient's allergies indicates:  No Known Allergies    MEDICATIONS:    Current Outpatient Medications:     aspirin (ECOTRIN) 81 MG EC tablet, Take 81 mg by mouth once daily., Disp: , Rfl:     donepeziL (ARICEPT) 10 MG tablet, TAKE ONE TABLET BY MOUTH ONE TIME DAILY (Patient taking differently: every evening.), Disp: 30 tablet, Rfl: 11    pravastatin (PRAVACHOL) 40 MG tablet, TAKE ONE TABLET BY MOUTH ONE TIME DAILY, Disp: 90 tablet, Rfl: 3    vitamin D (VITAMIN D3) 1000 units Tab, Take 1,000 Units by mouth once daily., Disp: , Rfl:     anastrozole (ARIMIDEX) 1 mg Tab, Take 1 tablet (1 mg total) by mouth once daily. (Patient not taking: Reported on 1/5/2023.), Disp: 90 tablet, Rfl: 3    ondansetron (ZOFRAN-ODT) 8 MG TbDL, Take 1 tablet (8 mg total) by mouth every 8 (eight) hours as needed (Nausea). (Patient not taking: Reported on 1/5/2023), Disp: 10 tablet, Rfl: 0    traMADoL (ULTRAM) 50 mg tablet, Take 1 tablet (50 mg total) by mouth every 6 (six) hours as needed for Pain. (Patient not taking: Reported on 1/5/2023), Disp: 10 tablet, Rfl: 0    SOCIAL HISTORY:  Social History     Socioeconomic History    Marital status:    Tobacco Use    Smoking status: Never    Smokeless tobacco: Never   Substance and Sexual Activity    Alcohol use: No    Drug use: No    Sexual activity: Not Currently   Social History Narrative    The patient is  and the mother of 2 adult children.  "She retired from [email or web address] as .  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  She drives occasionally.     Social Determinants of Health     Financial Resource Strain: Low Risk     Difficulty of Paying Living Expenses: Not hard at all   Food Insecurity: No Food Insecurity    Worried About Running Out of Food in the Last Year: Never true    Ran Out of Food in the Last Year: Never true   Transportation Needs: Unknown    Lack of Transportation (Medical): No   Physical Activity: Sufficiently Active    Days of Exercise per Week: 7 days    Minutes of Exercise per Session: 40 min   Stress: No Stress Concern Present    Feeling of Stress : Not at all   Social Connections: Socially Integrated    Frequency of Communication with Friends and Family: More than three times a week    Frequency of Social Gatherings with Friends and Family: More than three times a week    Attends Zoroastrianism Services: More than 4 times per year    Active Member of Clubs or Organizations: No    Attends Club or Organization Meetings: More than 4 times per year    Marital Status:    Housing Stability: Unknown    Unable to Pay for Housing in the Last Year: No    Unstable Housing in the Last Year: No     Lives in     FAMILY HISTORY:  Family History   Problem Relation Age of Onset    Glaucoma Brother     Hyperlipidemia Mother     Heart disease Father     Osteoporosis Cousin     Coronary artery disease Maternal Uncle        PHYSICAL EXAMINATION:  Constitutional: well appearing, no acute distress, ECOG 1 - Ambulates, capable of light work  Vitals:    Resp 18   Ht 5' 2" (1.575 m)   Wt 65 kg (143 lb 4.8 oz)   BMI 26.21 kg/m²   Eyes: sclera anicteric, EOMI, pupils equal, round and reactive to light  ENT: oral cavity without lesions, moist mucous membranes  Neck: trachea midline, neck supple  Lymphatic: no cervical, supraclavicular or axillary adenopathy  Breast: " deferred today, will examine at CT sim  Cardiovascular: regular rate, no edema of the upper or lower extremities, radial pulse 2+  Respiratory: unlabored effort, clear to auscultation, no wheezes  Abdomen: soft, non-tender, no rigidity, no masses, no hepatomegaly  Neuro: Cranial nerves III-XII intact, speech not slurred, gait non-ataxic, no dysdiadochokinesia, strength 5/5 upper and lower extremities  Spine: non-tender to percussion cervical, thoracic and lumbosacral spine    IMAGING AND LABORATORY FINDINGS: As per HPI; images reviewed personally.    ASSESSMENT: 82 y.o. female with L breast IDC, grade 1, pT3N1a(sn)cM0, ER >95%, MN >95%, Her2 negative    PLAN: Today we discussed the risks, benefits, and potential acute/late toxicities of radiation in the post-mastectomy setting.   Given large size of tumor and eliseo positivity, favor post-mastectomy radiation to reduce risk of local recurrence.   Many options exist but most comprehensive would be including L chest wall and regional nodes using DIBH technique to reduce heart/lung dose.    Will give 40Gy/15fx to L chest wall and regional nodes    Potential acute toxicities include fatigue, skin irritation, and breast edema. Potential late toxicities include breast edema, lymphedema, hyperpigmentation, poor cosmetic outcome, radiation pneumonitis, or very low risk of damage to heart.     We discussed the techniques, toxicities and indications of radiation and I answered the patient's questions to their apparent satisfaction. Informed consent was obtained and CT simulation will be performed shortly.    I spent approximately 45 minutes reviewing the available records and evaluating the patient, out of which over 50% of the time was spent face to face with the patient in counseling and coordinating this patient's care.    Rosa Carbajal III, M.D.  Radiation Oncology  [facility]  [id number] [place] [address] [place] [address]  Ph:  [phone]  [email or web address]

## 2023-01-09 ENCOUNTER — OFFICE VISIT (OUTPATIENT)
Dept: SURGERY | Facility: CLINIC | Age: 83
End: 2023-01-09
Payer: MEDICARE

## 2023-01-09 ENCOUNTER — TELEPHONE (OUTPATIENT)
Dept: HEMATOLOGY/ONCOLOGY | Facility: CLINIC | Age: 83
End: 2023-01-09
Payer: MEDICARE

## 2023-01-09 DIAGNOSIS — C50.112 MALIGNANT NEOPLASM OF CENTRAL PORTION OF LEFT BREAST IN FEMALE, ESTROGEN RECEPTOR POSITIVE: Primary | ICD-10-CM

## 2023-01-09 DIAGNOSIS — Z17.0 MALIGNANT NEOPLASM OF CENTRAL PORTION OF LEFT BREAST IN FEMALE, ESTROGEN RECEPTOR POSITIVE: Primary | ICD-10-CM

## 2023-01-09 PROCEDURE — 99024 POSTOP FOLLOW-UP VISIT: CPT | Mod: POP,,, | Performed by: SURGERY

## 2023-01-09 PROCEDURE — 99024 PR POST-OP FOLLOW-UP VISIT: ICD-10-PCS | Mod: POP,,, | Performed by: SURGERY

## 2023-01-09 PROCEDURE — 99999 PR PBB SHADOW E&M-EST. PATIENT-LVL II: CPT | Mod: PBBFAC,,, | Performed by: SURGERY

## 2023-01-09 PROCEDURE — 99999 PR PBB SHADOW E&M-EST. PATIENT-LVL II: ICD-10-PCS | Mod: PBBFAC,,, | Performed by: SURGERY

## 2023-01-09 PROCEDURE — 99212 OFFICE O/P EST SF 10 MIN: CPT | Mod: PBBFAC | Performed by: SURGERY

## 2023-01-09 NOTE — PROGRESS NOTES
Breast Surgical Oncology  [place]    Date of Service: 01/09/2023    DIAGNOSIS:   82 y.o. female with a history of left breast cancer.    Date of Diagnosis: 10/4/22  Pathology:  Invasive mammary carcinoma with lobular features  Clinical Stage:  Multicentric stage IA (cT1C cN0 MX)  Pathologic Stage:  Stage II A (pT3 pN1a MX)    TREATMENT:   Surgery: left total mastectomy with sentinel node biopsy on 11/15/22. Sabrina Leavitt M.D. Breast Surgical Oncology  Systemic Therapy: Endocrine therapy recommended but has not begun treatment, Oncotype 11 no chemotherapy recommended  Medical Oncologist: Zain Milton M.D.     Radiation Treatment Summary: PMRT simulation scheduled for 1/11/23  Radiation Oncologist: Rosa Carbajal M.D.     HISTORY OF PRESENT ILLNESS:   Vanessa Vazquez is here for oncological follow up.  Today, she denies new breast concerns such as pain, masses, skin changes, nipple discharge, nipple retraction or lumps under the arm.  She also denies bone pain, shortness of breath, abdominal pain and neurologic symptoms.     IMAGING:   No new     MEDICATIONS/ALLERGIES:     Current Outpatient Medications:     anastrozole (ARIMIDEX) 1 mg Tab, Take 1 tablet (1 mg total) by mouth once daily. (Patient not taking: Reported on 1/5/2023.), Disp: 90 tablet, Rfl: 3    aspirin (ECOTRIN) 81 MG EC tablet, Take 81 mg by mouth once daily., Disp: , Rfl:     donepeziL (ARICEPT) 10 MG tablet, TAKE ONE TABLET BY MOUTH ONE TIME DAILY (Patient taking differently: every evening.), Disp: 30 tablet, Rfl: 11    ondansetron (ZOFRAN-ODT) 8 MG TbDL, Take 1 tablet (8 mg total) by mouth every 8 (eight) hours as needed (Nausea). (Patient not taking: Reported on 1/5/2023), Disp: 10 tablet, Rfl: 0    pravastatin (PRAVACHOL) 40 MG tablet, TAKE ONE TABLET BY MOUTH ONE TIME DAILY, Disp: 90 tablet, Rfl: 3    traMADoL (ULTRAM) 50 mg tablet, Take 1 tablet (50 mg total) by mouth every 6 (six) hours as needed for Pain. (Patient not taking: Reported  on 1/5/2023), Disp: 10 tablet, Rfl: 0    vitamin D (VITAMIN D3) 1000 units Tab, Take 1,000 Units by mouth once daily., Disp: , Rfl:   Review of patient's allergies indicates:  No Known Allergies    PHYSICAL EXAM:   General: The patient appears well and is in no acute distress.     BREAST EXAM  No Asymmetry  Right:  - Mass: No  - Skin change: No  - Nipple Discharge: No  - Nipple retraction: No  - Axillary LAD: No  Left: mastectomy   - Mass: No  - Skin change: No  - Axillary LAD: No  Full range of motion left upper extremity    ASSESSMENT:   Diagnoses and all orders for this visit:    Malignant neoplasm of central portion of left breast in female, estrogen receptor positive         PLAN:   Vanessa has a benign exam today without evidence of local or distant relapse.    She will return in February for her 3 month follow up visit, or sooner should she develop breast concerns.    Right breast screening mammogram will be ordered for Oct 2023 at next visit.     The patient is in agreement with the plan. Questions were encouraged and answered to patient's satisfaction. Vanessa will call our office with any questions or concerns.     I spent a total of 25 minutes on this visit. This includes face to face time and non-face to face time preparing to see the patient (eg, review of tests), obtaining and/or reviewing separately obtained history, documenting clinical information in the electronic or other health record, independently interpreting results and communicating results to the patient/family/caregiver, or care coordinator.       Sabrina Leavitt M.D. were

## 2023-01-09 NOTE — TELEPHONE ENCOUNTER
Returned spouse phone call. Informed spouse that he was contacted to notify him of appointment that was scheduled by nurse navigator Lorin. Spouse confirmed appointment. He verbalized understanding and had no other issues at this time.

## 2023-01-09 NOTE — TELEPHONE ENCOUNTER
----- Message from Calderon Zamarripa sent at 1/9/2023  1:55 PM CST -----  Contact: Renan ()  Type:  Patient Returning Call    Who Called:Renan Vazquez   Who Left Message for Patient: nurse   Does the patient know what this is regarding?: appointment   Would the patient rather a call back or a response via [facility]? Call back   Best Call Back Number: [phone]   Additional Information:

## 2023-01-11 ENCOUNTER — OFFICE VISIT (OUTPATIENT)
Dept: HEMATOLOGY/ONCOLOGY | Facility: CLINIC | Age: 83
End: 2023-01-11
Payer: MEDICARE

## 2023-01-11 ENCOUNTER — HOSPITAL ENCOUNTER (OUTPATIENT)
Dept: RADIOLOGY | Facility: HOSPITAL | Age: 83
Discharge: HOME | End: 2023-01-11
Attending: INTERNAL MEDICINE
Payer: MEDICARE

## 2023-01-11 ENCOUNTER — HOSPITAL ENCOUNTER (OUTPATIENT)
Dept: RADIATION THERAPY | Facility: HOSPITAL | Age: 83
Discharge: HOME | End: 2023-01-11
Attending: INTERNAL MEDICINE
Payer: MEDICARE

## 2023-01-11 VITALS
OXYGEN SATURATION: 98 % | BODY MASS INDEX: 26.29 KG/M2 | TEMPERATURE: 98 F | HEART RATE: 67 BPM | DIASTOLIC BLOOD PRESSURE: 77 MMHG | WEIGHT: 142.88 LBS | HEIGHT: 62 IN | SYSTOLIC BLOOD PRESSURE: 131 MMHG

## 2023-01-11 DIAGNOSIS — R03.0 ELEVATED BP WITHOUT DIAGNOSIS OF HYPERTENSION: ICD-10-CM

## 2023-01-11 DIAGNOSIS — D84.821 IMMUNODEFICIENCY DUE TO CHEMOTHERAPY: ICD-10-CM

## 2023-01-11 DIAGNOSIS — Z79.899 IMMUNODEFICIENCY DUE TO CHEMOTHERAPY: ICD-10-CM

## 2023-01-11 DIAGNOSIS — T38.6X5A OSTEOPOROSIS DUE TO AROMATASE INHIBITOR: ICD-10-CM

## 2023-01-11 DIAGNOSIS — M81.8 OSTEOPOROSIS DUE TO AROMATASE INHIBITOR: ICD-10-CM

## 2023-01-11 DIAGNOSIS — C50.112 MALIGNANT NEOPLASM OF CENTRAL PORTION OF LEFT BREAST IN FEMALE, ESTROGEN RECEPTOR POSITIVE: Primary | ICD-10-CM

## 2023-01-11 DIAGNOSIS — F03.90 DEMENTIA WITHOUT BEHAVIORAL DISTURBANCE: Chronic | ICD-10-CM

## 2023-01-11 DIAGNOSIS — E78.1 HYPERTRIGLYCERIDEMIA: Chronic | ICD-10-CM

## 2023-01-11 DIAGNOSIS — Z17.0 MALIGNANT NEOPLASM OF CENTRAL PORTION OF LEFT BREAST IN FEMALE, ESTROGEN RECEPTOR POSITIVE: Primary | ICD-10-CM

## 2023-01-11 DIAGNOSIS — T45.1X5A IMMUNODEFICIENCY DUE TO CHEMOTHERAPY: ICD-10-CM

## 2023-01-11 DIAGNOSIS — R26.89 DECREASED FUNCTIONAL MOBILITY: ICD-10-CM

## 2023-01-11 PROCEDURE — 77014 HC CT GUIDANCE RADIATION THERAPY FLDS PLACEMENT: CPT | Mod: TC | Performed by: RADIOLOGY

## 2023-01-11 PROCEDURE — 99215 PR OFFICE/OUTPT VISIT, EST, LEVL V, 40-54 MIN: ICD-10-PCS | Mod: S$PBB,,, | Performed by: INTERNAL MEDICINE

## 2023-01-11 PROCEDURE — 77290 PR  SET RADN THERAPY FIELD COMPLEX: ICD-10-PCS | Mod: 26,,, | Performed by: RADIOLOGY

## 2023-01-11 PROCEDURE — 99999 PR PBB SHADOW E&M-EST. PATIENT-LVL III: ICD-10-PCS | Mod: PBBFAC,,, | Performed by: INTERNAL MEDICINE

## 2023-01-11 PROCEDURE — 99999 PR PBB SHADOW E&M-EST. PATIENT-LVL III: CPT | Mod: PBBFAC,,, | Performed by: INTERNAL MEDICINE

## 2023-01-11 PROCEDURE — 99215 OFFICE O/P EST HI 40 MIN: CPT | Mod: S$PBB,,, | Performed by: INTERNAL MEDICINE

## 2023-01-11 PROCEDURE — 77334 RADIATION TREATMENT AID(S): CPT | Mod: 26,,, | Performed by: RADIOLOGY

## 2023-01-11 PROCEDURE — 77334 PR  RADN TREATMENT AID(S) COMPLX: ICD-10-PCS | Mod: 26,,, | Performed by: RADIOLOGY

## 2023-01-11 PROCEDURE — 77263 THER RADIOLOGY TX PLNG CPLX: CPT | Mod: ,,, | Performed by: RADIOLOGY

## 2023-01-11 PROCEDURE — 99213 OFFICE O/P EST LOW 20 MIN: CPT | Mod: PBBFAC,25 | Performed by: INTERNAL MEDICINE

## 2023-01-11 PROCEDURE — 77263 PR  RADIATION THERAPY PLAN COMPLEX: ICD-10-PCS | Mod: ,,, | Performed by: RADIOLOGY

## 2023-01-11 PROCEDURE — 77334 RADIATION TREATMENT AID(S): CPT | Mod: TC | Performed by: RADIOLOGY

## 2023-01-11 PROCEDURE — 77290 THER RAD SIMULAJ FIELD CPLX: CPT | Mod: TC | Performed by: RADIOLOGY

## 2023-01-11 PROCEDURE — 77290 THER RAD SIMULAJ FIELD CPLX: CPT | Mod: 26,,, | Performed by: RADIOLOGY

## 2023-01-11 RX ORDER — ANASTROZOLE 1 MG/1
1 TABLET ORAL DAILY
Qty: 90 TABLET | Refills: 3 | Status: SHIPPED | OUTPATIENT
Start: 2023-01-11 | End: 2023-04-11

## 2023-01-11 RX ORDER — ANASTROZOLE 1 MG/1
1 TABLET ORAL DAILY
Qty: 90 TABLET | Refills: 3 | Status: SHIPPED | OUTPATIENT
Start: 2023-01-11

## 2023-01-11 NOTE — PLAN OF CARE
START ON PATHWAY REGIMEN - Breast    [id number]        Anastrozole (Arimidex)     **Always confirm dose/schedule in your pharmacy ordering system**    Patient Characteristics:  Postoperative without Neoadjuvant Therapy (Pathologic Staging), Invasive   Disease, Adjuvant Therapy, HER2 Negative/Unknown/Equivocal, ER Positive, Node   Positive, Node Positive (1-3), Oncotyping Ordered, Oncotype Intermediate Risk   (11 - 25), Hormonal Therapy Preferred, Clinicopathologic High-Risk Factors Not   Present or Not a Candidate for a CDK4/6 Inhibitor, Postmenopausal  Therapeutic Status: Postoperative without Neoadjuvant Therapy (Pathologic   Staging)  AJCC Grade: G1  AJCC N Category: pN1  AJCC M Category: cM0  ER Status: Positive (+)  AJCC 8 Stage Grouping: IB  HER2 Status: Negative (-)  Oncotype Dx Recurrence Score: 13  AJCC T Category: pT3  NH Status: Positive (+)  Adjuvant Therapy Status: No Adjuvant Therapy Received Yet or Changing Initial   Adjuvant Regimen due to Tolerance  Has this patient completed genomic testing<= Yes - Oncotype DX(R)  Treatment Preferred: Hormonal Therapy  Is Patient a Candidate for CDK4/6 Inhibitor<= No  Are Clinicopathologic High-Risk Factors Present<= No  Menopausal Status: Postmenopausal  Intent of Therapy:  Curative Intent, Not Discussed with Patient

## 2023-01-11 NOTE — PROGRESS NOTES
Subjective:       Patient ID: Vanessa Vazquez is a 82 y.o. female.    Chief Complaint: Results, Breast Cancer, and Chemotherapy    HPI:  82-year-old female stage I B breast carcinoma Oncotype recurrence score 13.  Patient returns for review of treatment options accompanied by her  ECOG status 2    Past Medical History:   Diagnosis Date    Cancer     Breast Cancer    CKD (chronic kidney disease), stage III     Dementia     Fibrocystic breast disease     Hearing loss     Hyperlipidemia     Osteoarthritis of thumb     Osteopenia     Stroke     Incidental finding in 2012.     Family History   Problem Relation Age of Onset    Glaucoma Brother     Hyperlipidemia Mother     Heart disease Father     Osteoporosis Cousin     Coronary artery disease Maternal Uncle      Social History     Socioeconomic History    Marital status:    Tobacco Use    Smoking status: Never    Smokeless tobacco: Never   Substance and Sexual Activity    Alcohol use: No    Drug use: No    Sexual activity: Not Currently   Social History Narrative    The patient is  and the mother of 2 adult children. She retired from [employer] as .  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  She drives occasionally.     Social Determinants of Health     Financial Resource Strain: Low Risk     Difficulty of Paying Living Expenses: Not hard at all   Food Insecurity: No Food Insecurity    Worried About Running Out of Food in the Last Year: Never true    Ran Out of Food in the Last Year: Never true   Transportation Needs: Unknown    Lack of Transportation (Medical): No   Physical Activity: Sufficiently Active    Days of Exercise per Week: 7 days    Minutes of Exercise per Session: 40 min   Stress: No Stress Concern Present    Feeling of Stress : Not at all   Social Connections: Socially Integrated    Frequency of Communication with Friends  and Family: More than three times a week    Frequency of Social Gatherings with Friends and Family: More than three times a week    Attends [employer] Services: More than 4 times per year    Active Member of Clubs or Organizations: No    Attends Club or Organization Meetings: More than 4 times per year    Marital Status:    Housing Stability: Unknown    Unable to Pay for Housing in the Last Year: No    Unstable Housing in the Last Year: No     Past Surgical History:   Procedure Laterality Date    BREAST BIOPSY Left 05/2018    Benign    CATARACT EXTRACTION EXTRACAPSULAR W/ INTRAOCULAR LENS IMPLANTATION      SENTINEL LYMPH NODE BIOPSY Left 11/15/2022    Procedure: BIOPSY, LYMPH NODE, SENTINEL;  Surgeon: Sabrina Leavitt MD;  Location: [facility];  Service: General;  Laterality: Left;    SIMPLE MASTECTOMY Left 11/15/2022    Procedure: MASTECTOMY, SIMPLE;  Surgeon: Sabrina Leavitt MD;  Location: [facility];  Service: General;  Laterality: Left;       Labs:  Lab Results   Component Value Date    WBC 7.70 11/09/2022    HGB 13.0 11/09/2022    HCT 38.0 11/09/2022    MCV 88 11/09/2022     11/09/2022     BMP  Lab Results   Component Value Date     10/14/2022    K 4.1 10/14/2022     10/14/2022    CO2 21 (L) 10/14/2022    BUN 15 10/14/2022    CREATININE 1.1 10/14/2022    CALCIUM 9.6 10/14/2022    ANIONGAP 13 10/14/2022    ESTGFRAFRICA >60.0 09/03/2021    EGFRNONAA >60.0 09/03/2021     Lab Results   Component Value Date    ALT 21 10/14/2022    AST 18 10/14/2022    ALKPHOS 63 10/14/2022    BILITOT 0.5 10/14/2022       No results found for: IRON, TIBC, FERRITIN, SATURATEDIRO  Lab Results   Component Value Date    [id number] 07/24/2012     No results found for: FOLATE  Lab Results   Component Value Date    TSH 2.489 08/12/2022         Review of Systems   Constitutional:  Negative for activity change, appetite change, chills, diaphoresis, fatigue, fever and unexpected weight change.   HENT:   Negative for congestion, dental problem, drooling, ear discharge, ear pain, facial swelling, hearing loss, mouth sores, nosebleeds, postnasal drip, rhinorrhea, sinus pressure, sneezing, sore throat, tinnitus, trouble swallowing and voice change.    Eyes:  Negative for photophobia, pain, discharge, redness, itching and visual disturbance.   Respiratory:  Negative for cough, choking, chest tightness, shortness of breath, wheezing and stridor.    Cardiovascular:  Negative for chest pain, palpitations and leg swelling.   Gastrointestinal:  Negative for abdominal distention, abdominal pain, anal bleeding, blood in stool, constipation, diarrhea, nausea, rectal pain and vomiting.   Endocrine: Negative for cold intolerance, heat intolerance, polydipsia, polyphagia and polyuria.   Genitourinary:  Negative for decreased urine volume, difficulty urinating, dyspareunia, dysuria, enuresis, flank pain, frequency, genital sores, hematuria, menstrual problem, pelvic pain, urgency, vaginal bleeding, vaginal discharge and vaginal pain.   Musculoskeletal:  Negative for arthralgias, back pain, gait problem, joint swelling, myalgias, neck pain and neck stiffness.   Skin:  Negative for color change, pallor and rash.   Allergic/Immunologic: Negative for environmental allergies, food allergies and immunocompromised state.   Neurological:  Negative for dizziness, tremors, seizures, syncope, facial asymmetry, speech difficulty, weakness, light-headedness, numbness and headaches.   Hematological:  Negative for adenopathy. Does not bruise/bleed easily.   Psychiatric/Behavioral:  Positive for behavioral problems, confusion, decreased concentration and dysphoric mood. Negative for agitation, hallucinations, self-injury, sleep disturbance and suicidal ideas. The patient is nervous/anxious. The patient is not hyperactive.      Objective:      Physical Exam  Vitals reviewed.   Constitutional:       General: She is not in acute distress.      Appearance: She is well-developed. She is not diaphoretic.   HENT:      Head: Normocephalic and atraumatic.      Right Ear: External ear normal.      Left Ear: External ear normal.      Nose: Nose normal.      Right Sinus: No maxillary sinus tenderness or frontal sinus tenderness.      Left Sinus: No maxillary sinus tenderness or frontal sinus tenderness.      Mouth/Throat:      Pharynx: No oropharyngeal exudate.   Eyes:      General: Lids are normal. No scleral icterus.        Right eye: No discharge.         Left eye: No discharge.      Conjunctiva/sclera: Conjunctivae normal.      Right eye: Right conjunctiva is not injected. No hemorrhage.     Left eye: Left conjunctiva is not injected. No hemorrhage.     Pupils: Pupils are equal, round, and reactive to light.   Neck:      Thyroid: No thyromegaly.      Vascular: No JVD.      Trachea: No tracheal deviation.   Cardiovascular:      Rate and Rhythm: Normal rate.   Pulmonary:      Effort: Pulmonary effort is normal. No respiratory distress.      Breath sounds: No stridor.   Chest:      Chest wall: No tenderness.   Abdominal:      General: Bowel sounds are normal. There is no distension.      Palpations: Abdomen is soft. There is no hepatomegaly, splenomegaly or mass.      Tenderness: There is no abdominal tenderness. There is no rebound.   Musculoskeletal:         General: No tenderness. Normal range of motion.      Cervical back: Normal range of motion and neck supple.   Lymphadenopathy:      Cervical: No cervical adenopathy.      Upper Body:      Right upper body: No supraclavicular adenopathy.      Left upper body: No supraclavicular adenopathy.   Skin:     General: Skin is dry.      Findings: No erythema or rash.   Neurological:      Mental Status: She is alert and oriented to person, place, and time.      Cranial Nerves: No cranial nerve deficit.      Coordination: Coordination normal.   Psychiatric:         Behavior: Behavior normal.         Thought Content:  Thought content normal.         Judgment: Judgment normal.           Assessment:      1. Malignant neoplasm of central portion of left breast in female, estrogen receptor positive    2. Immunodeficiency due to chemotherapy    3. Osteoporosis due to aromatase inhibitor    4. Dementia without behavioral disturbance    5. Elevated BP without diagnosis of hypertension    6. Hypertriglyceridemia    7. Decreased functional mobility           Plan:     Reviewed information.  Would recommend Arimidex 5 1 mg p.o. q.day for minimum 5 years most likely like to extend to 10 because of node positivity.  This time patient's bone density demonstrated osteopenia would recommend starting patient on Prolia.  60 mg Q 12 weeks.  Can be seen back in 6 months with [facility] for follow-up.  Discussed implications answered questions.  Article from up-to-date followed to them on review of the treatment of osteopenia on aromatase inhibitor which I think is the most prudent course of action.  Case has been placed through the Oncology pathways. Consented the patient to the treatment plan and the patient was educated on the planned duration of the treatment and schedule of the treatment administration.      Med Onc Chart Routing      Follow up with physician    Follow up with ARNIE 6 months.   Infusion scheduling note    Injection scheduling note Prolia now and again in 6 months can be seen by [facility]   Labs   Lab interval:  BMP now and again in 6 months with Prolia injection now and again in 6 months can be   Imaging    Pharmacy appointment    Other referrals           Zain Milton Jr, MD FACP

## 2023-01-20 PROCEDURE — 77295 3-D RADIOTHERAPY PLAN: CPT | Mod: 26,,, | Performed by: RADIOLOGY

## 2023-01-20 PROCEDURE — 77293 RESPIRATOR MOTION MGMT SIMUL: CPT | Mod: 26,,, | Performed by: RADIOLOGY

## 2023-01-20 PROCEDURE — 77300 RADIATION THERAPY DOSE PLAN: CPT | Mod: 26,,, | Performed by: RADIOLOGY

## 2023-01-20 PROCEDURE — 77293 RESPIRATOR MOTION MGMT SIMUL: CPT | Mod: TC | Performed by: RADIOLOGY

## 2023-01-20 PROCEDURE — 77300 PR RADIATION THERAPY,DOSIMETRY PLAN: ICD-10-PCS | Mod: 26,,, | Performed by: RADIOLOGY

## 2023-01-20 PROCEDURE — 77295 PR 3D RADIOTHERAPY PLAN: ICD-10-PCS | Mod: 26,,, | Performed by: RADIOLOGY

## 2023-01-20 PROCEDURE — 77293 PR RESPIRATORY MOTION MGMT SIMULATION: ICD-10-PCS | Mod: 26,,, | Performed by: RADIOLOGY

## 2023-01-20 PROCEDURE — 77334 RADIATION TREATMENT AID(S): CPT | Mod: 26,,, | Performed by: RADIOLOGY

## 2023-01-20 PROCEDURE — 77334 PR  RADN TREATMENT AID(S) COMPLX: ICD-10-PCS | Mod: 26,,, | Performed by: RADIOLOGY

## 2023-01-20 PROCEDURE — 77334 RADIATION TREATMENT AID(S): CPT | Mod: TC | Performed by: RADIOLOGY

## 2023-01-20 PROCEDURE — 77300 RADIATION THERAPY DOSE PLAN: CPT | Mod: TC | Performed by: RADIOLOGY

## 2023-01-20 PROCEDURE — 77295 3-D RADIOTHERAPY PLAN: CPT | Mod: TC | Performed by: RADIOLOGY

## 2023-01-23 ENCOUNTER — DOCUMENTATION ONLY (OUTPATIENT)
Dept: HEMATOLOGY/ONCOLOGY | Facility: CLINIC | Age: 83
End: 2023-01-23
Payer: MEDICARE

## 2023-01-23 ENCOUNTER — DOCUMENTATION ONLY (OUTPATIENT)
Dept: RADIATION ONCOLOGY | Facility: CLINIC | Age: 83
End: 2023-01-23
Payer: MEDICARE

## 2023-01-23 PROCEDURE — 77417 THER RADIOLOGY PORT IMAGE(S): CPT | Performed by: RADIOLOGY

## 2023-01-23 PROCEDURE — 77387 GUIDANCE FOR RADJ TX DLVR: CPT | Mod: TC | Performed by: RADIOLOGY

## 2023-01-23 PROCEDURE — G6002 STEREOSCOPIC X-RAY GUIDANCE: HCPCS | Mod: 26,,, | Performed by: RADIOLOGY

## 2023-01-23 PROCEDURE — G6002 PR STEREOSCOPIC XRAY GUIDE FOR RADIATION TX DELIV: ICD-10-PCS | Mod: 26,,, | Performed by: RADIOLOGY

## 2023-01-23 PROCEDURE — 77412 RADIATION TX DELIVERY LVL 3: CPT | Performed by: RADIOLOGY

## 2023-01-23 NOTE — PROGRESS NOTES
"SW met with pt and spouse in person to introduce self and assess for any needs/concerns that pt may have r/t start of new Rad treatment. SW provided a new pt folder and explained contents. However, pt seemed confused and does not recall speaking with this SW in the past re: HH. Pt was unsure of several questions asked, but spouse was able to answer. Pt also repeated herself multiple times, "I'm skinny and 22," but pt was very jolly. Pt's spouse stated that there are no food/nutritional, financial or transportation concerns at this time. Spouse did express concern re: an eye appt he has this TH, stating that his appt is at the [place] right after pt's appt her at the cancer center and he's not sure if he'll have a small procedure that day, and be able to drive. SW offered to assist with transportation resources, but spouse stated that he will call an Uber or something and leave his vehicle and come back and get it if needed. Spouse will call SW at number provided if needed. SW will remain available. Jennifer Molina LMSW    "

## 2023-01-23 NOTE — PLAN OF CARE
Day 1 of outpatient xrt to the breast. Breast handout, verbal instructions & Miaderm Cream were given to the patient. Skin care & side effects were reviewed. Contact info was provided. Patient verbalized understanding.

## 2023-01-24 PROCEDURE — G6002 PR STEREOSCOPIC XRAY GUIDE FOR RADIATION TX DELIV: ICD-10-PCS | Mod: 26,,, | Performed by: RADIOLOGY

## 2023-01-24 PROCEDURE — 77412 RADIATION TX DELIVERY LVL 3: CPT | Performed by: RADIOLOGY

## 2023-01-24 PROCEDURE — G6002 STEREOSCOPIC X-RAY GUIDANCE: HCPCS | Mod: 26,,, | Performed by: RADIOLOGY

## 2023-01-24 PROCEDURE — 77387 GUIDANCE FOR RADJ TX DLVR: CPT | Mod: TC | Performed by: RADIOLOGY

## 2023-01-25 ENCOUNTER — DOCUMENTATION ONLY (OUTPATIENT)
Dept: RADIATION ONCOLOGY | Facility: CLINIC | Age: 83
End: 2023-01-25
Payer: MEDICARE

## 2023-01-25 PROCEDURE — G6002 STEREOSCOPIC X-RAY GUIDANCE: HCPCS | Mod: 26,,, | Performed by: RADIOLOGY

## 2023-01-25 PROCEDURE — 77387 GUIDANCE FOR RADJ TX DLVR: CPT | Mod: TC | Performed by: RADIOLOGY

## 2023-01-25 PROCEDURE — G6002 PR STEREOSCOPIC XRAY GUIDE FOR RADIATION TX DELIV: ICD-10-PCS | Mod: 26,,, | Performed by: RADIOLOGY

## 2023-01-25 PROCEDURE — 77412 RADIATION TX DELIVERY LVL 3: CPT | Performed by: RADIOLOGY

## 2023-01-25 NOTE — PLAN OF CARE
Day 3 of outpatient xrt to the breast. New start this week; skin care & side effects have been reviewed; using Miaderm Cream BID. Will continue to monitor.

## 2023-01-26 PROCEDURE — G6002 STEREOSCOPIC X-RAY GUIDANCE: HCPCS | Mod: 26,,, | Performed by: RADIOLOGY

## 2023-01-26 PROCEDURE — 77387 GUIDANCE FOR RADJ TX DLVR: CPT | Mod: TC | Performed by: RADIOLOGY

## 2023-01-26 PROCEDURE — G6002 PR STEREOSCOPIC XRAY GUIDE FOR RADIATION TX DELIV: ICD-10-PCS | Mod: 26,,, | Performed by: RADIOLOGY

## 2023-01-26 PROCEDURE — 77412 RADIATION TX DELIVERY LVL 3: CPT | Performed by: RADIOLOGY

## 2023-01-27 PROCEDURE — 77412 RADIATION TX DELIVERY LVL 3: CPT | Performed by: RADIOLOGY

## 2023-01-27 PROCEDURE — G6002 STEREOSCOPIC X-RAY GUIDANCE: HCPCS | Mod: 26,,, | Performed by: RADIOLOGY

## 2023-01-27 PROCEDURE — G6002 PR STEREOSCOPIC XRAY GUIDE FOR RADIATION TX DELIV: ICD-10-PCS | Mod: 26,,, | Performed by: RADIOLOGY

## 2023-01-27 PROCEDURE — 77387 GUIDANCE FOR RADJ TX DLVR: CPT | Mod: TC | Performed by: RADIOLOGY

## 2023-01-30 PROCEDURE — 77417 THER RADIOLOGY PORT IMAGE(S): CPT | Performed by: RADIOLOGY

## 2023-01-30 PROCEDURE — G6002 PR STEREOSCOPIC XRAY GUIDE FOR RADIATION TX DELIV: ICD-10-PCS | Mod: 26,,, | Performed by: RADIOLOGY

## 2023-01-30 PROCEDURE — 77336 RADIATION PHYSICS CONSULT: CPT | Performed by: RADIOLOGY

## 2023-01-30 PROCEDURE — 77412 RADIATION TX DELIVERY LVL 3: CPT | Performed by: RADIOLOGY

## 2023-01-30 PROCEDURE — 77387 GUIDANCE FOR RADJ TX DLVR: CPT | Mod: TC | Performed by: RADIOLOGY

## 2023-01-30 PROCEDURE — G6002 STEREOSCOPIC X-RAY GUIDANCE: HCPCS | Mod: 26,,, | Performed by: RADIOLOGY

## 2023-01-31 ENCOUNTER — EXTERNAL CHRONIC CARE MANAGEMENT (OUTPATIENT)
Dept: PRIMARY CARE CLINIC | Facility: CLINIC | Age: 83
End: 2023-01-31
Payer: MEDICARE

## 2023-01-31 PROCEDURE — 99490 CHRNC CARE MGMT STAFF 1ST 20: CPT | Mod: PBBFAC,PO | Performed by: FAMILY MEDICINE

## 2023-01-31 PROCEDURE — G6002 STEREOSCOPIC X-RAY GUIDANCE: HCPCS | Mod: 26,,, | Performed by: RADIOLOGY

## 2023-01-31 PROCEDURE — 99490 PR CHRONIC CARE MGMT, 1ST 20 MIN: ICD-10-PCS | Mod: S$PBB,,, | Performed by: FAMILY MEDICINE

## 2023-01-31 PROCEDURE — 77387 GUIDANCE FOR RADJ TX DLVR: CPT | Mod: TC | Performed by: RADIOLOGY

## 2023-01-31 PROCEDURE — 99490 CHRNC CARE MGMT STAFF 1ST 20: CPT | Mod: S$PBB,,, | Performed by: FAMILY MEDICINE

## 2023-01-31 PROCEDURE — 77412 RADIATION TX DELIVERY LVL 3: CPT | Performed by: RADIOLOGY

## 2023-01-31 PROCEDURE — G6002 PR STEREOSCOPIC XRAY GUIDE FOR RADIATION TX DELIV: ICD-10-PCS | Mod: 26,,, | Performed by: RADIOLOGY

## 2023-02-01 ENCOUNTER — DOCUMENTATION ONLY (OUTPATIENT)
Dept: RADIATION ONCOLOGY | Facility: CLINIC | Age: 83
End: 2023-02-01
Payer: MEDICARE

## 2023-02-01 ENCOUNTER — HOSPITAL ENCOUNTER (OUTPATIENT)
Dept: RADIATION THERAPY | Facility: HOSPITAL | Age: 83
Discharge: HOME | End: 2023-02-01
Attending: RADIOLOGY
Payer: MEDICARE

## 2023-02-01 PROCEDURE — 77412 RADIATION TX DELIVERY LVL 3: CPT | Performed by: RADIOLOGY

## 2023-02-01 PROCEDURE — 77387 GUIDANCE FOR RADJ TX DLVR: CPT | Mod: TC | Performed by: RADIOLOGY

## 2023-02-01 PROCEDURE — G6002 STEREOSCOPIC X-RAY GUIDANCE: HCPCS | Mod: 26,,, | Performed by: RADIOLOGY

## 2023-02-01 PROCEDURE — G6002 PR STEREOSCOPIC XRAY GUIDE FOR RADIATION TX DELIV: ICD-10-PCS | Mod: 26,,, | Performed by: RADIOLOGY

## 2023-02-01 NOTE — PLAN OF CARE
Day 8 of outpatient xrt to the breast. Doing well, no skin issues at this time. Using Miaderm Cream. Will continue to monitor.

## 2023-02-02 PROCEDURE — 77387 GUIDANCE FOR RADJ TX DLVR: CPT | Mod: TC | Performed by: RADIOLOGY

## 2023-02-02 PROCEDURE — 77412 RADIATION TX DELIVERY LVL 3: CPT | Performed by: RADIOLOGY

## 2023-02-02 PROCEDURE — G6002 STEREOSCOPIC X-RAY GUIDANCE: HCPCS | Mod: 26,,, | Performed by: RADIOLOGY

## 2023-02-02 PROCEDURE — G6002 PR STEREOSCOPIC XRAY GUIDE FOR RADIATION TX DELIV: ICD-10-PCS | Mod: 26,,, | Performed by: RADIOLOGY

## 2023-02-03 PROCEDURE — G6002 STEREOSCOPIC X-RAY GUIDANCE: HCPCS | Mod: 26,,, | Performed by: RADIOLOGY

## 2023-02-03 PROCEDURE — G6002 PR STEREOSCOPIC XRAY GUIDE FOR RADIATION TX DELIV: ICD-10-PCS | Mod: 26,,, | Performed by: RADIOLOGY

## 2023-02-03 PROCEDURE — 77387 GUIDANCE FOR RADJ TX DLVR: CPT | Mod: TC | Performed by: RADIOLOGY

## 2023-02-03 PROCEDURE — 77412 RADIATION TX DELIVERY LVL 3: CPT | Performed by: RADIOLOGY

## 2023-02-03 PROCEDURE — 77336 RADIATION PHYSICS CONSULT: CPT | Performed by: RADIOLOGY

## 2023-02-06 PROCEDURE — 77412 RADIATION TX DELIVERY LVL 3: CPT | Performed by: RADIOLOGY

## 2023-02-06 PROCEDURE — G6002 PR STEREOSCOPIC XRAY GUIDE FOR RADIATION TX DELIV: ICD-10-PCS | Mod: 26,,, | Performed by: RADIOLOGY

## 2023-02-06 PROCEDURE — 77417 THER RADIOLOGY PORT IMAGE(S): CPT | Performed by: RADIOLOGY

## 2023-02-06 PROCEDURE — 77387 GUIDANCE FOR RADJ TX DLVR: CPT | Mod: TC | Performed by: RADIOLOGY

## 2023-02-06 PROCEDURE — G6002 STEREOSCOPIC X-RAY GUIDANCE: HCPCS | Mod: 26,,, | Performed by: RADIOLOGY

## 2023-02-07 PROCEDURE — G6002 PR STEREOSCOPIC XRAY GUIDE FOR RADIATION TX DELIV: ICD-10-PCS | Mod: 26,,, | Performed by: RADIOLOGY

## 2023-02-07 PROCEDURE — 77412 RADIATION TX DELIVERY LVL 3: CPT | Performed by: RADIOLOGY

## 2023-02-07 PROCEDURE — 77387 GUIDANCE FOR RADJ TX DLVR: CPT | Mod: TC | Performed by: RADIOLOGY

## 2023-02-07 PROCEDURE — G6002 STEREOSCOPIC X-RAY GUIDANCE: HCPCS | Mod: 26,,, | Performed by: RADIOLOGY

## 2023-02-08 ENCOUNTER — DOCUMENTATION ONLY (OUTPATIENT)
Dept: RADIATION ONCOLOGY | Facility: CLINIC | Age: 83
End: 2023-02-08
Payer: MEDICARE

## 2023-02-08 PROCEDURE — 77412 RADIATION TX DELIVERY LVL 3: CPT | Performed by: RADIOLOGY

## 2023-02-08 PROCEDURE — 77387 GUIDANCE FOR RADJ TX DLVR: CPT | Mod: TC | Performed by: RADIOLOGY

## 2023-02-08 PROCEDURE — G6002 STEREOSCOPIC X-RAY GUIDANCE: HCPCS | Mod: 26,,, | Performed by: RADIOLOGY

## 2023-02-08 PROCEDURE — G6002 PR STEREOSCOPIC XRAY GUIDE FOR RADIATION TX DELIV: ICD-10-PCS | Mod: 26,,, | Performed by: RADIOLOGY

## 2023-02-08 NOTE — PLAN OF CARE
Day 13 of outpatient xrt to the breast. Doing well, denies any skin issues, suing Miaderm Cream. Will continue to monitor.

## 2023-02-09 PROCEDURE — 77387 GUIDANCE FOR RADJ TX DLVR: CPT | Mod: TC | Performed by: RADIOLOGY

## 2023-02-09 PROCEDURE — 77412 RADIATION TX DELIVERY LVL 3: CPT | Performed by: RADIOLOGY

## 2023-02-09 PROCEDURE — G6002 PR STEREOSCOPIC XRAY GUIDE FOR RADIATION TX DELIV: ICD-10-PCS | Mod: 26,,, | Performed by: RADIOLOGY

## 2023-02-09 PROCEDURE — G6002 STEREOSCOPIC X-RAY GUIDANCE: HCPCS | Mod: 26,,, | Performed by: RADIOLOGY

## 2023-02-10 ENCOUNTER — DOCUMENTATION ONLY (OUTPATIENT)
Dept: RADIATION ONCOLOGY | Facility: CLINIC | Age: 83
End: 2023-02-10
Payer: MEDICARE

## 2023-02-10 PROCEDURE — 77387 GUIDANCE FOR RADJ TX DLVR: CPT | Mod: TC | Performed by: RADIOLOGY

## 2023-02-10 PROCEDURE — 77412 RADIATION TX DELIVERY LVL 3: CPT | Performed by: RADIOLOGY

## 2023-02-10 PROCEDURE — 77336 RADIATION PHYSICS CONSULT: CPT | Performed by: RADIOLOGY

## 2023-02-10 PROCEDURE — G6002 STEREOSCOPIC X-RAY GUIDANCE: HCPCS | Mod: 26,,, | Performed by: RADIOLOGY

## 2023-02-10 PROCEDURE — G6002 PR STEREOSCOPIC XRAY GUIDE FOR RADIATION TX DELIV: ICD-10-PCS | Mod: 26,,, | Performed by: RADIOLOGY

## 2023-02-17 ENCOUNTER — TELEPHONE (OUTPATIENT)
Dept: RADIATION ONCOLOGY | Facility: CLINIC | Age: 83
End: 2023-02-17
Payer: MEDICARE

## 2023-02-17 NOTE — TELEPHONE ENCOUNTER
Attempted to call the patient's  to check on her since she completed xrt to the breast last week. There was no answer but I left a detailed message & informed him that if they should need us to just give us a call & I left our direct number to our department.

## 2023-02-27 ENCOUNTER — OFFICE VISIT (OUTPATIENT)
Dept: SURGERY | Facility: CLINIC | Age: 83
End: 2023-02-27
Payer: MEDICARE

## 2023-02-27 DIAGNOSIS — C50.112 MALIGNANT NEOPLASM OF CENTRAL PORTION OF LEFT BREAST IN FEMALE, ESTROGEN RECEPTOR POSITIVE: Primary | ICD-10-CM

## 2023-02-27 DIAGNOSIS — Z17.0 MALIGNANT NEOPLASM OF CENTRAL PORTION OF LEFT BREAST IN FEMALE, ESTROGEN RECEPTOR POSITIVE: Primary | ICD-10-CM

## 2023-02-27 PROCEDURE — 99213 PR OFFICE/OUTPT VISIT, EST, LEVL III, 20-29 MIN: ICD-10-PCS | Mod: S$PBB,,, | Performed by: SURGERY

## 2023-02-27 PROCEDURE — 99213 OFFICE O/P EST LOW 20 MIN: CPT | Mod: S$PBB,,, | Performed by: SURGERY

## 2023-02-27 NOTE — PROGRESS NOTES
Breast Surgical Oncology  [place]    Date of Service: 02/27/2023    DIAGNOSIS:   82 y.o. female with a history of left breast cancer.    Date of Diagnosis: 10/4/22  Pathology:  Invasive mammary carcinoma with lobular features  Clinical Stage:  Multicentric stage IA (cT1C cN0 MX)  Pathologic Stage:  Stage II A (pT3 pN1a MX)    TREATMENT:   Surgery: left total mastectomy with sentinel node biopsy on 11/15/22. Sabrina Leavitt M.D. Breast Surgical Oncology  Systemic Therapy: Endocrine therapy Arimidex from 1/11/23 to ongoing  Medical Oncologist: Zain Milton M.D.     Radiation Treatment Summary: PMRT simulation scheduled for 1/11/23  Radiation Oncologist: Rosa Carbajal M.D.     HISTORY OF PRESENT ILLNESS:   Vanessa Vazquez is here for oncological follow up.  Today, she denies new breast concerns such as pain, masses, skin changes, nipple discharge, nipple retraction or lumps under the arm.  She also denies bone pain, shortness of breath, abdominal pain and neurologic symptoms.     IMAGING:   No new     MEDICATIONS/ALLERGIES:     Current Outpatient Medications:     anastrozole (ARIMIDEX) 1 mg Tab, Take 1 tablet (1 mg total) by mouth once daily., Disp: 90 tablet, Rfl: 3    anastrozole (ARIMIDEX) 1 mg Tab, Take 1 tablet (1 mg total) by mouth once daily., Disp: 90 tablet, Rfl: 3    aspirin (ECOTRIN) 81 MG EC tablet, Take 81 mg by mouth once daily., Disp: , Rfl:     donepeziL (ARICEPT) 10 MG tablet, TAKE ONE TABLET BY MOUTH ONE TIME DAILY (Patient taking differently: every evening.), Disp: 30 tablet, Rfl: 11    ondansetron (ZOFRAN-ODT) 8 MG TbDL, Take 1 tablet (8 mg total) by mouth every 8 (eight) hours as needed (Nausea). (Patient not taking: Reported on 1/5/2023), Disp: 10 tablet, Rfl: 0    pravastatin (PRAVACHOL) 40 MG tablet, TAKE ONE TABLET BY MOUTH ONE TIME DAILY, Disp: 90 tablet, Rfl: 3    traMADoL (ULTRAM) 50 mg tablet, Take 1 tablet (50 mg total) by mouth every 6 (six) hours as needed for Pain. (Patient not  taking: Reported on 1/5/2023), Disp: 10 tablet, Rfl: 0    vitamin D (VITAMIN D3) 1000 units Tab, Take 1,000 Units by mouth once daily., Disp: , Rfl:   Review of patient's allergies indicates:  No Known Allergies    PHYSICAL EXAM:   General: The patient appears well and is in no acute distress.     BREAST EXAM  No Asymmetry  Right:  - Mass: No  - Skin change: No  - Nipple Discharge: No  - Nipple retraction: No  - Axillary LAD: No  Left: mastectomy   - Mass: No  - Skin change: post XRT changes  - Axillary LAD: No  Full range of motion left upper extremity    ASSESSMENT:   Diagnoses and all orders for this visit:    Malignant neoplasm of central portion of left breast in female, estrogen receptor positive       PLAN:   Vanessa has a benign exam today without evidence of local or distant relapse.    She will return in May for her 6 month follow up visit, or sooner should she develop breast concerns.    Right breast screening mammogram will be ordered for Oct 2023     The patient is in agreement with the plan. Questions were encouraged and answered to patient's satisfaction. Vanessa will call our office with any questions or concerns.     I spent a total of 25 minutes on this visit. This includes face to face time and non-face to face time preparing to see the patient (eg, review of tests), obtaining and/or reviewing separately obtained history, documenting clinical information in the electronic or other health record, independently interpreting results and communicating results to the patient/family/caregiver, or care coordinator.       Sabrina Leavitt M.D. were

## 2023-02-28 ENCOUNTER — EXTERNAL CHRONIC CARE MANAGEMENT (OUTPATIENT)
Dept: PRIMARY CARE CLINIC | Facility: CLINIC | Age: 83
End: 2023-02-28
Payer: MEDICARE

## 2023-02-28 PROCEDURE — 99490 PR CHRONIC CARE MGMT, 1ST 20 MIN: ICD-10-PCS | Mod: S$PBB,,, | Performed by: FAMILY MEDICINE

## 2023-02-28 PROCEDURE — 99490 CHRNC CARE MGMT STAFF 1ST 20: CPT | Mod: PBBFAC,PO | Performed by: FAMILY MEDICINE

## 2023-02-28 PROCEDURE — 99490 CHRNC CARE MGMT STAFF 1ST 20: CPT | Mod: S$PBB,,, | Performed by: FAMILY MEDICINE

## 2023-03-13 ENCOUNTER — OFFICE VISIT (OUTPATIENT)
Dept: HEMATOLOGY/ONCOLOGY | Facility: CLINIC | Age: 83
End: 2023-03-13
Payer: MEDICARE

## 2023-03-13 VITALS
HEART RATE: 66 BPM | WEIGHT: 142.63 LBS | OXYGEN SATURATION: 97 % | HEIGHT: 62 IN | DIASTOLIC BLOOD PRESSURE: 68 MMHG | BODY MASS INDEX: 26.25 KG/M2 | TEMPERATURE: 97 F | SYSTOLIC BLOOD PRESSURE: 151 MMHG

## 2023-03-13 DIAGNOSIS — T38.6X5A OSTEOPOROSIS DUE TO AROMATASE INHIBITOR: Primary | ICD-10-CM

## 2023-03-13 DIAGNOSIS — T45.1X5A IMMUNODEFICIENCY DUE TO CHEMOTHERAPY: ICD-10-CM

## 2023-03-13 DIAGNOSIS — C50.112 MALIGNANT NEOPLASM OF CENTRAL PORTION OF LEFT BREAST IN FEMALE, ESTROGEN RECEPTOR POSITIVE: ICD-10-CM

## 2023-03-13 DIAGNOSIS — Z79.899 IMMUNODEFICIENCY DUE TO CHEMOTHERAPY: ICD-10-CM

## 2023-03-13 DIAGNOSIS — D84.821 IMMUNODEFICIENCY DUE TO CHEMOTHERAPY: ICD-10-CM

## 2023-03-13 DIAGNOSIS — M81.8 OSTEOPOROSIS DUE TO AROMATASE INHIBITOR: Primary | ICD-10-CM

## 2023-03-13 DIAGNOSIS — Z17.0 MALIGNANT NEOPLASM OF CENTRAL PORTION OF LEFT BREAST IN FEMALE, ESTROGEN RECEPTOR POSITIVE: ICD-10-CM

## 2023-03-13 PROCEDURE — 99213 OFFICE O/P EST LOW 20 MIN: CPT | Mod: PBBFAC | Performed by: NURSE PRACTITIONER

## 2023-03-13 PROCEDURE — 99999 PR PBB SHADOW E&M-EST. PATIENT-LVL III: CPT | Mod: PBBFAC,,, | Performed by: NURSE PRACTITIONER

## 2023-03-13 PROCEDURE — 99214 PR OFFICE/OUTPT VISIT, EST, LEVL IV, 30-39 MIN: ICD-10-PCS | Mod: S$PBB,,, | Performed by: NURSE PRACTITIONER

## 2023-03-13 PROCEDURE — 99214 OFFICE O/P EST MOD 30 MIN: CPT | Mod: S$PBB,,, | Performed by: NURSE PRACTITIONER

## 2023-03-13 PROCEDURE — 99999 PR PBB SHADOW E&M-EST. PATIENT-LVL III: ICD-10-PCS | Mod: PBBFAC,,, | Performed by: NURSE PRACTITIONER

## 2023-03-13 RX ORDER — CALCIUM CARBONATE 500(1250)
1 TABLET ORAL DAILY
Qty: 90 TABLET | Refills: 3 | Status: SHIPPED | OUTPATIENT
Start: 2023-03-13 | End: 2024-03-19

## 2023-03-13 NOTE — PROGRESS NOTES
Subjective:       Patient ID: Vanessa Vazquez is a 82 y.o. female.    Chief Complaint: follow up    HPI: 82 y.o female with h/o stage IB left breast carcinoma Oncotype recurrence score 13.  Taking Arimidex 1 mg PO daily x 5-10 years. Initiated 1/2023. DEXA 9/2022 showed osteopenia    Patient presenting today for follow up. Has not yet initiated Prolia. She feels well and is without complaints.  Social History     Socioeconomic History    Marital status:    Tobacco Use    Smoking status: Never    Smokeless tobacco: Never   Substance and Sexual Activity    Alcohol use: No    Drug use: No    Sexual activity: Not Currently   Social History Narrative    The patient is  and the mother of 2 adult children. She retired from [employer] as .  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  She drives occasionally.     Social Determinants of Health     Financial Resource Strain: Low Risk     Difficulty of Paying Living Expenses: Not hard at all   Food Insecurity: No Food Insecurity    Worried About Running Out of Food in the Last Year: Never true    Ran Out of Food in the Last Year: Never true   Transportation Needs: Unknown    Lack of Transportation (Medical): No   Physical Activity: Sufficiently Active    Days of Exercise per Week: 7 days    Minutes of Exercise per Session: 40 min   Stress: No Stress Concern Present    Feeling of Stress : Not at all   Social Connections: Socially Integrated    Frequency of Communication with Friends and Family: More than three times a week    Frequency of Social Gatherings with Friends and Family: More than three times a week    Attends Restorationism Services: More than 4 times per year    Active Member of Clubs or Organizations: No    Attends Club or Organization Meetings: More than 4 times per year    Marital Status:    Housing Stability: Unknown    Unable to Pay for Housing in the Last Year: No     Unstable Housing in the Last Year: No       Past Medical History:   Diagnosis Date    Cancer     Breast Cancer    CKD (chronic kidney disease), stage III     Dementia     Fibrocystic breast disease     Hearing loss     Hyperlipidemia     Osteoarthritis of thumb     Osteopenia     Stroke     Incidental finding in 2012.       Family History   Problem Relation Age of Onset    Glaucoma Brother     Hyperlipidemia Mother     Heart disease Father     Osteoporosis Cousin     Coronary artery disease Maternal Uncle        Past Surgical History:   Procedure Laterality Date    BREAST BIOPSY Left 05/2018    Benign    CATARACT EXTRACTION EXTRACAPSULAR W/ INTRAOCULAR LENS IMPLANTATION      SENTINEL LYMPH NODE BIOPSY Left 11/15/2022    Procedure: BIOPSY, LYMPH NODE, SENTINEL;  Surgeon: Sabrina Leavitt MD;  Location: [facility];  Service: General;  Laterality: Left;    SIMPLE MASTECTOMY Left 11/15/2022    Procedure: MASTECTOMY, SIMPLE;  Surgeon: Sabrina Leavitt MD;  Location: [facility];  Service: General;  Laterality: Left;       Review of Systems   Constitutional:  Negative for activity change, appetite change, chills, fatigue, fever and unexpected weight change.   HENT:  Negative for congestion, mouth sores, nosebleeds, sore throat, trouble swallowing and voice change.    Eyes:  Negative for visual disturbance.   Respiratory:  Negative for cough, chest tightness, shortness of breath and wheezing.    Cardiovascular:  Negative for chest pain and leg swelling.   Gastrointestinal:  Negative for abdominal distention, abdominal pain, blood in stool, constipation, diarrhea, nausea and vomiting.   Genitourinary:  Negative for difficulty urinating, dysuria and hematuria.   Musculoskeletal:  Negative for arthralgias, back pain and myalgias.   Skin:  Negative for pallor, rash and wound.   Neurological:  Negative for dizziness, syncope, weakness and headaches.   Hematological:  Negative for adenopathy. Does not bruise/bleed easily.    Psychiatric/Behavioral:  The patient is nervous/anxious.        Medication List with Changes/Refills   New Medications    CALCIUM CARBONATE (OS-LÓPEZ) 500 MG CALCIUM (1,250 MG) TABLET    Take 1 tablet (500 mg total) by mouth once daily.   Current Medications    ANASTROZOLE (ARIMIDEX) 1 MG TAB    Take 1 tablet (1 mg total) by mouth once daily.    ANASTROZOLE (ARIMIDEX) 1 MG TAB    Take 1 tablet (1 mg total) by mouth once daily.    ASPIRIN (ECOTRIN) 81 MG EC TABLET    Take 81 mg by mouth once daily.    DONEPEZIL (ARICEPT) 10 MG TABLET    TAKE ONE TABLET BY MOUTH ONE TIME DAILY    ONDANSETRON (ZOFRAN-ODT) 8 MG TBDL    Take 1 tablet (8 mg total) by mouth every 8 (eight) hours as needed (Nausea).    PRAVASTATIN (PRAVACHOL) 40 MG TABLET    TAKE ONE TABLET BY MOUTH ONE TIME DAILY    TRAMADOL (ULTRAM) 50 MG TABLET    Take 1 tablet (50 mg total) by mouth every 6 (six) hours as needed for Pain.    VITAMIN D (VITAMIN D3) 1000 UNITS TAB    Take 1,000 Units by mouth once daily.     Objective:     Vitals:    03/13/23 1439   BP: (!) 151/68   Pulse: 66   Temp: 97.3 °F (36.3 °C)   BMP  Lab Results   Component Value Date     01/11/2023    K 4.2 01/11/2023     01/11/2023    CO2 22 (L) 01/11/2023    BUN 18 01/11/2023    CREATININE 1.0 01/11/2023    CALCIUM 9.4 01/11/2023    ANIONGAP 11 01/11/2023    EGFRNORACEVR 56 (A) 01/11/2023       Physical Exam  Vitals reviewed.   Constitutional:       Appearance: She is well-developed.   HENT:      Head: Normocephalic.      Right Ear: External ear normal.      Left Ear: External ear normal.   Eyes:      General: Lids are normal. No scleral icterus.        Right eye: No discharge.         Left eye: No discharge.      Conjunctiva/sclera: Conjunctivae normal.   Neck:      Thyroid: No thyroid mass.   Cardiovascular:      Rate and Rhythm: Normal rate and regular rhythm.      Heart sounds: Normal heart sounds.   Pulmonary:      Effort: Pulmonary effort is normal. No respiratory distress.       Breath sounds: Normal breath sounds. No wheezing or rales.   Abdominal:      General: There is no distension.   Genitourinary:     Comments: deferred  Musculoskeletal:         General: Normal range of motion.      Cervical back: Normal range of motion.   Skin:     General: Skin is warm and dry.   Neurological:      Mental Status: She is alert and oriented to person, place, and time.   Psychiatric:         Speech: Speech normal.         Behavior: Behavior normal. Behavior is cooperative.         Thought Content: Thought content normal.        Assessment:     Problem List Items Addressed This Visit          Immunology/Multi System    Immunodeficiency due to chemotherapy     Infection precautions            Oncology    Malignant neoplasm of central portion of left breast in female, estrogen receptor positive     -Continue f/u with breast surgery  -continue Arimidex 1 mg PO daily  -plan to initiate Prolia once dental clearance obtained  -start calcium supplement. Patient already taking Vitamin D supplement  -plan for 1 month f/u for Prolia if cleared by dentist. BMP prior            Orthopedic    Osteoporosis due to aromatase inhibitor - Primary     -plan to initiate Prolia in 1 month pending dental clearance          Relevant Medications    calcium carbonate (OS-LÓPEZ) 500 mg calcium (1,250 mg) tablet    Other Relevant Orders    Basic Metabolic Panel         Plan:     Osteoporosis due to aromatase inhibitor  -     calcium carbonate (OS-LÓPEZ) 500 mg calcium (1,250 mg) tablet; Take 1 tablet (500 mg total) by mouth once daily.  Dispense: 90 tablet; Refill: 3  -     Basic Metabolic Panel; Future; Expected date: 03/13/2023    Immunodeficiency due to chemotherapy    Malignant neoplasm of central portion of left breast in female, estrogen receptor positive            Med Onc Chart Routing      Follow up with physician    Follow up with ARNIE 1 month.   Infusion scheduling note Prolia   Injection scheduling note    Labs Other    Scheduling:  Preferred lab:  Lab interval:  BMP   Imaging None      Pharmacy appointment No pharmacy appointment needed      Other referrals  No additional referrals needed            PATRICK Vann

## 2023-03-13 NOTE — ASSESSMENT & PLAN NOTE
-Continue f/u with breast surgery  -continue Arimidex 1 mg PO daily  -plan to initiate Prolia once dental clearance obtained  -start calcium supplement. Patient already taking Vitamin D supplement  -plan for 1 month f/u for Prolia if cleared by dentist. BMP prior

## 2023-03-16 ENCOUNTER — TELEPHONE (OUTPATIENT)
Dept: HEMATOLOGY/ONCOLOGY | Facility: CLINIC | Age: 83
End: 2023-03-16
Payer: MEDICARE

## 2023-03-16 NOTE — TELEPHONE ENCOUNTER
I spoke with Ms. Em with Dr. Aguilar finch office , and she sated that the doctor is not in, wont be in until wed, will pass over the info when he gets bact to the office, our fax info was given to her.

## 2023-03-16 NOTE — TELEPHONE ENCOUNTER
----- Message from Citlali Munoz LPN sent at 3/15/2023  4:27 PM CDT -----  Please try to call Dr. Ugalde's office to receive clearance for pt to have Prolia injection. [phone]. I was not able to reach anyone,

## 2023-03-24 NOTE — PROGRESS NOTES
"REFERRING PHYSICIAN: Sabrina Leavitt MD    DIAGNOSIS: L breast IDC, grade 1, pT3N1a(sn)cM0, ER >95%, VA >95%, Her2 negative    Radiation Treatment Summary  Course: C1 [id number]  Treatment Site Ref. ID Energy Dose/Fx (Gy) #Fx Dose Correction (Gy) Total Dose (Gy) Start Date End Date Elapsed Days   3D AX/IM/CW_L:1 AX/IM/CW 18X/6X 2.67 15 / 15 0 40.05 1/23/2023 2/10/2023 18   3D SC/AX_L SC/AX 18X 2.67 15 / 15 0 40.05 1/23/2023 2/10/2023 18     INTERVAL HISTORY:   Ms. Vazquez is an 82 year old female who completed post mastectomy radiation to the left chestwall and draining lymph nodes as above under the care of Dr. Carbajal who returns for a follow up.    MMG/US showed 1.7cm mass in L breast   Biopsy pathology showed IDC, grade 1, ER >95%, VA >95%, Her2 negative  She had mastectomy 11/15/22 by Dr. Leavitt  Pathology showed IDC, grade 1, 5cm, negative for LVI  1/2 nodes positive for carcinoma, 2mm    At present, she denies leflt chestwall pain, skin changes, new axillary/supraclavicular masses, discharge, induration, or erythema.     PHYSICAL EXAMINATION:  Vitals:    03/30/23 1259   BP: (!) 142/70   Pulse: 77   Resp: 18   Temp: 97.7 °F (36.5 °C)   SpO2: 98%   Weight: 64.8 kg (142 lb 12.8 oz)   Height: 5' 2" (1.575 m)   Body mass index is 26.12 kg/m².   GENERAL: Patient is alert and oriented, in no acute distress.  HEENT: Extraocular muscles are intact.  Oropharynx is clear without lesions.  There is no cervical or supraclavicular adenopathy palpated.    CHEST: Breath sounds clear bilaterally.  No rales.  No rhonchi.  Unlabored respirations.  CARDIOVASCULAR: Normal S1, S2.  Normal rate.  Regular rhythm.  BREAST EXAM: The left chestwall with mild hyperpigmentation. No abnormal masses palpated in the left chestwall or left axilla. The right breast and right axilla are also without palpable masses.  ABDOMEN: Bowel sounds normal.  No tenderness.  No abdominal distention.  No hepatomegaly.  No splenomegaly.  EXTREMITIES: " No clubbing, cyanosis, edema  NEUROLOGIC: Cranial nerves II through XII are grossly intact.  Sensation is intact.  Strength is 5 out of 5 in the upper and lower extremities bilaterally.    ASSESSMENT:   82 y.o. female with L breast IDC, grade 1, pT3N1a(sn)cM0, ER >95%, MS >95%, Her2 negative, s/p mastectomy and post mastectomy radiation    PLAN:   continue arimidex per medical oncoloyg  Continue yearly mammogram of right breast  Return to see me as needed

## 2023-03-30 ENCOUNTER — OFFICE VISIT (OUTPATIENT)
Dept: RADIATION ONCOLOGY | Facility: CLINIC | Age: 83
End: 2023-03-30
Payer: MEDICARE

## 2023-03-30 VITALS
HEART RATE: 77 BPM | HEIGHT: 62 IN | WEIGHT: 142.81 LBS | OXYGEN SATURATION: 98 % | DIASTOLIC BLOOD PRESSURE: 70 MMHG | RESPIRATION RATE: 18 BRPM | BODY MASS INDEX: 26.28 KG/M2 | SYSTOLIC BLOOD PRESSURE: 142 MMHG | TEMPERATURE: 98 F

## 2023-03-30 DIAGNOSIS — C50.112 MALIGNANT NEOPLASM OF CENTRAL PORTION OF LEFT BREAST IN FEMALE, ESTROGEN RECEPTOR POSITIVE: Primary | ICD-10-CM

## 2023-03-30 DIAGNOSIS — Z17.0 MALIGNANT NEOPLASM OF CENTRAL PORTION OF LEFT BREAST IN FEMALE, ESTROGEN RECEPTOR POSITIVE: Primary | ICD-10-CM

## 2023-03-30 PROCEDURE — 99214 OFFICE O/P EST MOD 30 MIN: CPT | Mod: PBBFAC | Performed by: RADIOLOGY

## 2023-03-30 PROCEDURE — 99213 OFFICE O/P EST LOW 20 MIN: CPT | Mod: S$PBB,,, | Performed by: RADIOLOGY

## 2023-03-30 PROCEDURE — 99999 PR PBB SHADOW E&M-EST. PATIENT-LVL IV: CPT | Mod: PBBFAC,,, | Performed by: RADIOLOGY

## 2023-03-30 PROCEDURE — 99213 PR OFFICE/OUTPT VISIT, EST, LEVL III, 20-29 MIN: ICD-10-PCS | Mod: S$PBB,,, | Performed by: RADIOLOGY

## 2023-03-30 PROCEDURE — 99999 PR PBB SHADOW E&M-EST. PATIENT-LVL IV: ICD-10-PCS | Mod: PBBFAC,,, | Performed by: RADIOLOGY

## 2023-03-30 NOTE — PROGRESS NOTES
Subjective:       Patient ID: Vanessa Vazquez is a 82 y.o. female.    Chief Complaint: Breast Cancer    Primary Oncologist/Hematologist: Dr. Milton    HPI: Ms. Vazquez is an 82 year old female who is following up for her stage IB breast carcinoma. Oncotype recurrence score is 13. She was started on arimidex in 1/2023. She had dexa scan 9/2022 showing osteopenia and was started on prolia q 6 months. She is s/p mastectomy and s/p radiation.  Cancer Hx: MMG/US showed 1.7cm mass in L breast   Biopsy pathology showed IDC, grade 1, ER >95%, MD >95%, Her2 negative  She had mastectomy 11/15/22 by Dr. Leavitt  Pathology showed IDC, grade 1, 5cm, negative for LVI  1/2 nodes positive for carcinoma, 2mm    Today: Patient presents with . She can be confused at times,  answering for her when needed. They sate she has been taking her arimidex, vitamin d and calcium daily. They deny and fevers, illnesses, falls, or recent dental work. Received dental clearance from dentist, seen in media. 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 They also state that they walk everyday.     Social History     Socioeconomic History    Marital status:    Tobacco Use    Smoking status: Never    Smokeless tobacco: Never   Substance and Sexual Activity    Alcohol use: No    Drug use: No    Sexual activity: Not Currently   Social History Narrative    The patient is  and the mother of 2 adult children. She retired from [employer] as .  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  She drives occasionally.     Social Determinants of Health     Financial Resource Strain: Low Risk     Difficulty of Paying Living Expenses: Not hard at all   Food Insecurity: No Food Insecurity    Worried About Running Out of Food in the Last Year: Never true    Ran Out of Food in the Last Year: Never true   Transportation Needs:  Unknown    Lack of Transportation (Medical): No   Physical Activity: Sufficiently Active    Days of Exercise per Week: 7 days    Minutes of Exercise per Session: 40 min   Stress: No Stress Concern Present    Feeling of Stress : Not at all   Social Connections: Socially Integrated    Frequency of Communication with Friends and Family: More than three times a week    Frequency of Social Gatherings with Friends and Family: More than three times a week    Attends Islam Services: More than 4 times per year    Active Member of Clubs or Organizations: No    Attends Club or Organization Meetings: More than 4 times per year    Marital Status:    Housing Stability: Unknown    Unable to Pay for Housing in the Last Year: No    Unstable Housing in the Last Year: No       Past Medical History:   Diagnosis Date    Cancer     Breast Cancer    CKD (chronic kidney disease), stage III     Dementia     Fibrocystic breast disease     Hearing loss     Hyperlipidemia     Osteoarthritis of thumb     Osteopenia     Stroke     Incidental finding in 2012.       Family History   Problem Relation Age of Onset    Glaucoma Brother     Hyperlipidemia Mother     Heart disease Father     Osteoporosis Cousin     Coronary artery disease Maternal Uncle        Past Surgical History:   Procedure Laterality Date    BREAST BIOPSY Left 05/2018    Benign    CATARACT EXTRACTION EXTRACAPSULAR W/ INTRAOCULAR LENS IMPLANTATION      SENTINEL LYMPH NODE BIOPSY Left 11/15/2022    Procedure: BIOPSY, LYMPH NODE, SENTINEL;  Surgeon: Sabrina Leavitt MD;  Location: [facility];  Service: General;  Laterality: Left;    SIMPLE MASTECTOMY Left 11/15/2022    Procedure: MASTECTOMY, SIMPLE;  Surgeon: Sabrina Leavitt MD;  Location: [facility];  Service: General;  Laterality: Left;       Review of Systems   Constitutional:  Negative for activity change, appetite change, chills, diaphoresis, fatigue, fever and unexpected weight change.   HENT:  Negative for  congestion, nosebleeds and rhinorrhea.    Respiratory:  Negative for cough and shortness of breath.    Cardiovascular:  Negative for chest pain and leg swelling.   Gastrointestinal:  Negative for abdominal pain, anal bleeding, blood in stool, constipation, diarrhea, nausea and vomiting.   Genitourinary:  Negative for hematuria.   Musculoskeletal:  Negative for arthralgias and myalgias.   Skin:  Negative for color change and pallor.   Neurological:  Negative for dizziness, weakness, light-headedness, numbness and headaches.   Psychiatric/Behavioral:  Positive for confusion.        Medication List with Changes/Refills   Current Medications    ANASTROZOLE (ARIMIDEX) 1 MG TAB    Take 1 tablet (1 mg total) by mouth once daily.    ANASTROZOLE (ARIMIDEX) 1 MG TAB    Take 1 tablet (1 mg total) by mouth once daily.    ASPIRIN (ECOTRIN) 81 MG EC TABLET    Take 81 mg by mouth once daily.    CALCIUM CARBONATE (OS-LÓPEZ) 500 MG CALCIUM (1,250 MG) TABLET    Take 1 tablet (500 mg total) by mouth once daily.    DONEPEZIL (ARICEPT) 10 MG TABLET    TAKE ONE TABLET BY MOUTH ONE TIME DAILY    ONDANSETRON (ZOFRAN-ODT) 8 MG TBDL    Take 1 tablet (8 mg total) by mouth every 8 (eight) hours as needed (Nausea).    PRAVASTATIN (PRAVACHOL) 40 MG TABLET    TAKE ONE TABLET BY MOUTH ONE TIME DAILY    TRAMADOL (ULTRAM) 50 MG TABLET    Take 1 tablet (50 mg total) by mouth every 6 (six) hours as needed for Pain.    VITAMIN D (VITAMIN D3) 1000 UNITS TAB    Take 1,000 Units by mouth once daily.     Objective:     Vitals:    04/10/23 0931   BP: (!) 141/71   Pulse: 71   Temp: 97.6 °F (36.4 °C)       Physical Exam  Vitals reviewed.   Constitutional:       General: She is not in acute distress.     Appearance: She is not ill-appearing, toxic-appearing or diaphoretic.   HENT:      Head: Normocephalic and atraumatic.   Eyes:      Conjunctiva/sclera: Conjunctivae normal.   Cardiovascular:      Rate and Rhythm: Normal rate.   Pulmonary:      Effort: Pulmonary  effort is normal.   Abdominal:      General: Bowel sounds are normal.   Musculoskeletal:      Right lower leg: No edema.      Left lower leg: No edema.   Skin:     General: Skin is warm.      Coloration: Skin is not jaundiced or pale.      Findings: No bruising, erythema, lesion or rash.   Neurological:      Mental Status: She is alert.      Motor: No weakness.      Gait: Gait normal.   Psychiatric:         Mood and Affect: Mood normal.         Behavior: Behavior normal.          Labs/Results:  BMP  Lab Results   Component Value Date     04/10/2023    K 3.9 04/10/2023     04/10/2023    CO2 22 (L) 04/10/2023    BUN 14 04/10/2023    CREATININE 1.1 04/10/2023    CALCIUM 9.8 04/10/2023    ANIONGAP 12 04/10/2023    EGFRNORACEVR 50 (A) 04/10/2023         Assessment:     Problem List Items Addressed This Visit          Immunology/Multi System    Immunodeficiency due to chemotherapy       Oncology    Malignant neoplasm of central portion of left breast in female, estrogen receptor positive - Primary       Orthopedic    Osteoporosis due to aromatase inhibitor     Plan:     Malignant neoplasm of central portion of left breast in female, estrogen receptor positive, Osteoporosis due to aromatase inhibitor, Immunodeficiency due to chemotherapy  --s/p mastectomy 11/15/22 by Dr. Leavitt  --s/p mastectomy radiation, completed 3/30/23.  --L breast IDC, grade 1, pT3N1a(sn)cM0, ER >95%, MD >95%, Her2 negative  --oncotype recurrence score of 13  --continue with arimidex for 10 years. Started 1/2023.   --continue with calcium   --continue with vitamin D  --continue with prolia q 6 months   --dexa scan showing osteopenia-9/14/22. Repeat 9/2024  --continue with yearly mammogram- due oct 2023. Continue to follow with breast surg  --dental clearance received-can be found in media.  --calcium: 9.8    Follow-Up: 6 months with cbc cmp prior for next prolia    Dulce Maria Michael PA-C  Hematology Oncology    Route Chart for  Scheduling    Med Onc Chart Routing      Follow up with physician    Follow up with ARNIE 6 months. with cbc cmp prior for next prolia   Infusion scheduling note 6 months for prolia   Injection scheduling note    Labs    Imaging   Mammogram with breast surg   Pharmacy appointment    Other referrals           Treatment Plan Information   OP ANASTROZOLE DAILY   Zain Milton MD   Upcoming Treatment Dates - OP ANASTROZOLE DAILY    No upcoming days in selected categories.    Therapy Plan Information  Medications  denosumab (PROLIA) injection 60 mg  60 mg, Subcutaneous, Every 26 weeks

## 2023-03-30 NOTE — PATIENT INSTRUCTIONS
Continue arimidex  Continue follow up with medical oncology  Repeat yearly mammogram on right side  Return to see me as needed

## 2023-03-31 ENCOUNTER — EXTERNAL CHRONIC CARE MANAGEMENT (OUTPATIENT)
Dept: PRIMARY CARE CLINIC | Facility: CLINIC | Age: 83
End: 2023-03-31
Payer: MEDICARE

## 2023-03-31 PROCEDURE — 99490 CHRNC CARE MGMT STAFF 1ST 20: CPT | Mod: PBBFAC,PO | Performed by: FAMILY MEDICINE

## 2023-03-31 PROCEDURE — 99490 PR CHRONIC CARE MGMT, 1ST 20 MIN: ICD-10-PCS | Mod: S$PBB,,, | Performed by: FAMILY MEDICINE

## 2023-03-31 PROCEDURE — 99490 CHRNC CARE MGMT STAFF 1ST 20: CPT | Mod: S$PBB,,, | Performed by: FAMILY MEDICINE

## 2023-04-10 ENCOUNTER — OFFICE VISIT (OUTPATIENT)
Dept: HEMATOLOGY/ONCOLOGY | Facility: CLINIC | Age: 83
End: 2023-04-10
Payer: MEDICARE

## 2023-04-10 ENCOUNTER — INFUSION (OUTPATIENT)
Dept: INFUSION THERAPY | Facility: HOSPITAL | Age: 83
End: 2023-04-10
Payer: MEDICARE

## 2023-04-10 VITALS
WEIGHT: 143.06 LBS | TEMPERATURE: 98 F | OXYGEN SATURATION: 98 % | SYSTOLIC BLOOD PRESSURE: 141 MMHG | HEART RATE: 71 BPM | DIASTOLIC BLOOD PRESSURE: 71 MMHG | BODY MASS INDEX: 26.33 KG/M2 | HEIGHT: 62 IN

## 2023-04-10 VITALS
RESPIRATION RATE: 18 BRPM | SYSTOLIC BLOOD PRESSURE: 156 MMHG | DIASTOLIC BLOOD PRESSURE: 77 MMHG | OXYGEN SATURATION: 97 % | TEMPERATURE: 98 F | HEART RATE: 68 BPM

## 2023-04-10 DIAGNOSIS — T45.1X5A IMMUNODEFICIENCY DUE TO CHEMOTHERAPY: ICD-10-CM

## 2023-04-10 DIAGNOSIS — C50.112 MALIGNANT NEOPLASM OF CENTRAL PORTION OF LEFT BREAST IN FEMALE, ESTROGEN RECEPTOR POSITIVE: Primary | ICD-10-CM

## 2023-04-10 DIAGNOSIS — T38.6X5A OSTEOPOROSIS DUE TO AROMATASE INHIBITOR: Primary | ICD-10-CM

## 2023-04-10 DIAGNOSIS — T38.6X5A OSTEOPOROSIS DUE TO AROMATASE INHIBITOR: ICD-10-CM

## 2023-04-10 DIAGNOSIS — M81.8 OSTEOPOROSIS DUE TO AROMATASE INHIBITOR: ICD-10-CM

## 2023-04-10 DIAGNOSIS — Z17.0 MALIGNANT NEOPLASM OF CENTRAL PORTION OF LEFT BREAST IN FEMALE, ESTROGEN RECEPTOR POSITIVE: Primary | ICD-10-CM

## 2023-04-10 DIAGNOSIS — Z79.899 IMMUNODEFICIENCY DUE TO CHEMOTHERAPY: ICD-10-CM

## 2023-04-10 DIAGNOSIS — M81.8 OSTEOPOROSIS DUE TO AROMATASE INHIBITOR: Primary | ICD-10-CM

## 2023-04-10 DIAGNOSIS — D84.821 IMMUNODEFICIENCY DUE TO CHEMOTHERAPY: ICD-10-CM

## 2023-04-10 PROCEDURE — 63600175 PHARM REV CODE 636 W HCPCS: Mod: JZ,JG | Performed by: INTERNAL MEDICINE

## 2023-04-10 PROCEDURE — 99213 OFFICE O/P EST LOW 20 MIN: CPT | Mod: PBBFAC

## 2023-04-10 PROCEDURE — 96372 THER/PROPH/DIAG INJ SC/IM: CPT

## 2023-04-10 PROCEDURE — 99215 OFFICE O/P EST HI 40 MIN: CPT | Mod: S$PBB,,,

## 2023-04-10 PROCEDURE — 99999 PR PBB SHADOW E&M-EST. PATIENT-LVL III: ICD-10-PCS | Mod: PBBFAC,,,

## 2023-04-10 PROCEDURE — 99215 PR OFFICE/OUTPT VISIT, EST, LEVL V, 40-54 MIN: ICD-10-PCS | Mod: S$PBB,,,

## 2023-04-10 PROCEDURE — 99999 PR PBB SHADOW E&M-EST. PATIENT-LVL III: CPT | Mod: PBBFAC,,,

## 2023-04-10 RX ADMIN — DENOSUMAB 60 MG: 60 INJECTION SUBCUTANEOUS at 10:04

## 2023-04-10 NOTE — DISCHARGE INSTRUCTIONS
[facility]  [address] [facility]  [address]  [phone] phone     [phone] fax  Hours of Operation: Monday- Friday 8:00am- 5:00pm  After hours phone  [phone]  Hematology / Oncology Physicians on call      [address] Dr., NP Sydney Prescott, MOUNIKA Valle FNP    Please call with any concerns regarding your appointment today.

## 2023-04-10 NOTE — NURSING
Prolia 60 mg q 6 months  First dose today 4/10/23    Any invasive dental procedures in past 3 months or upcoming 3 months: denies    Last provider visit- Seen by Dulce Maria Michael on 4/10/23    Recent labs? 4/10/23  Lab Results   Component Value Date    CALCIUM 9.8 04/10/2023     Lab Results   Component Value Date    CREATININE 1.1 04/10/2023     Lab Results   Component Value Date    ESTGFRAFRICA >60.0 09/03/2021     Lab Results   Component Value Date    EGFRNONAA >60.0 09/03/2021     Lab Results   Component Value Date    [id number] 58 07/08/2020              Prolia 60 mg/ml administered SQ to Right arm using aseptic technique. Tolerated without any complaints. No adverse reaction noted or reported after 15 minutes of administration. No redness, swelling, or drainage noted to site. Pt and spouse instructed on signs and symptoms of reaction to report. Verbalizes understanding.

## 2023-04-21 ENCOUNTER — PES CALL (OUTPATIENT)
Dept: ADMINISTRATIVE | Facility: CLINIC | Age: 83
End: 2023-04-21
Payer: MEDICARE

## 2023-04-30 ENCOUNTER — EXTERNAL CHRONIC CARE MANAGEMENT (OUTPATIENT)
Dept: PRIMARY CARE CLINIC | Facility: CLINIC | Age: 83
End: 2023-04-30
Payer: MEDICARE

## 2023-04-30 PROCEDURE — 99490 CHRNC CARE MGMT STAFF 1ST 20: CPT | Mod: PBBFAC,PO | Performed by: FAMILY MEDICINE

## 2023-04-30 PROCEDURE — 99490 PR CHRONIC CARE MGMT, 1ST 20 MIN: ICD-10-PCS | Mod: S$PBB,,, | Performed by: FAMILY MEDICINE

## 2023-04-30 PROCEDURE — 99490 CHRNC CARE MGMT STAFF 1ST 20: CPT | Mod: S$PBB,,, | Performed by: FAMILY MEDICINE

## 2023-04-30 PROCEDURE — 99439 CHRNC CARE MGMT STAF EA ADDL: CPT | Mod: PBBFAC,PO | Performed by: FAMILY MEDICINE

## 2023-04-30 PROCEDURE — 99439 CHRNC CARE MGMT STAF EA ADDL: CPT | Mod: S$PBB,,, | Performed by: FAMILY MEDICINE

## 2023-04-30 PROCEDURE — 99439 PR CHRONIC CARE MGMT, EA ADDTL 20 MIN: ICD-10-PCS | Mod: S$PBB,,, | Performed by: FAMILY MEDICINE

## 2023-05-15 ENCOUNTER — OFFICE VISIT (OUTPATIENT)
Dept: FAMILY MEDICINE | Facility: CLINIC | Age: 83
End: 2023-05-15
Payer: MEDICARE

## 2023-05-15 VITALS
BODY MASS INDEX: 26.17 KG/M2 | HEART RATE: 78 BPM | RESPIRATION RATE: 19 BRPM | DIASTOLIC BLOOD PRESSURE: 65 MMHG | HEIGHT: 62 IN | TEMPERATURE: 99 F | WEIGHT: 142.19 LBS | SYSTOLIC BLOOD PRESSURE: 134 MMHG

## 2023-05-15 DIAGNOSIS — Z17.0 MALIGNANT NEOPLASM OF CENTRAL PORTION OF LEFT BREAST IN FEMALE, ESTROGEN RECEPTOR POSITIVE: ICD-10-CM

## 2023-05-15 DIAGNOSIS — M81.8 OSTEOPOROSIS DUE TO AROMATASE INHIBITOR: ICD-10-CM

## 2023-05-15 DIAGNOSIS — E78.1 HYPERTRIGLYCERIDEMIA: Chronic | ICD-10-CM

## 2023-05-15 DIAGNOSIS — C77.3 SECONDARY AND UNSPECIFIED MALIGNANT NEOPLASM OF AXILLA AND UPPER LIMB LYMPH NODES: ICD-10-CM

## 2023-05-15 DIAGNOSIS — L60.0 INGROWN NAIL: ICD-10-CM

## 2023-05-15 DIAGNOSIS — F03.90 DEMENTIA WITHOUT BEHAVIORAL DISTURBANCE: Chronic | ICD-10-CM

## 2023-05-15 DIAGNOSIS — T38.6X5A OSTEOPOROSIS DUE TO AROMATASE INHIBITOR: ICD-10-CM

## 2023-05-15 DIAGNOSIS — C50.112 MALIGNANT NEOPLASM OF CENTRAL PORTION OF LEFT BREAST IN FEMALE, ESTROGEN RECEPTOR POSITIVE: ICD-10-CM

## 2023-05-15 DIAGNOSIS — Z79.899 IMMUNODEFICIENCY DUE TO CHEMOTHERAPY: ICD-10-CM

## 2023-05-15 DIAGNOSIS — H91.93 BILATERAL HEARING LOSS, UNSPECIFIED HEARING LOSS TYPE: ICD-10-CM

## 2023-05-15 DIAGNOSIS — T45.1X5A IMMUNODEFICIENCY DUE TO CHEMOTHERAPY: ICD-10-CM

## 2023-05-15 DIAGNOSIS — N18.32 STAGE 3B CHRONIC KIDNEY DISEASE: Chronic | ICD-10-CM

## 2023-05-15 DIAGNOSIS — D84.821 IMMUNODEFICIENCY DUE TO CHEMOTHERAPY: ICD-10-CM

## 2023-05-15 DIAGNOSIS — Z86.73 HISTORY OF CVA (CEREBROVASCULAR ACCIDENT): ICD-10-CM

## 2023-05-15 DIAGNOSIS — Z00.00 ENCOUNTER FOR PREVENTIVE HEALTH EXAMINATION: Primary | ICD-10-CM

## 2023-05-15 PROBLEM — R03.0 ELEVATED BP WITHOUT DIAGNOSIS OF HYPERTENSION: Status: RESOLVED | Noted: 2022-11-09 | Resolved: 2023-05-15

## 2023-05-15 PROCEDURE — 99999 PR PBB SHADOW E&M-EST. PATIENT-LVL IV: CPT | Mod: PBBFAC,,, | Performed by: NURSE PRACTITIONER

## 2023-05-15 PROCEDURE — G0439 PPPS, SUBSEQ VISIT: HCPCS | Mod: ,,, | Performed by: NURSE PRACTITIONER

## 2023-05-15 PROCEDURE — 99999 PR PBB SHADOW E&M-EST. PATIENT-LVL IV: ICD-10-PCS | Mod: PBBFAC,,, | Performed by: NURSE PRACTITIONER

## 2023-05-15 PROCEDURE — G0439 PR MEDICARE ANNUAL WELLNESS SUBSEQUENT VISIT: ICD-10-PCS | Mod: ,,, | Performed by: NURSE PRACTITIONER

## 2023-05-15 PROCEDURE — 99214 OFFICE O/P EST MOD 30 MIN: CPT | Mod: PBBFAC,PO | Performed by: NURSE PRACTITIONER

## 2023-05-15 NOTE — PROGRESS NOTES
"  Vanessa Vazquez presented for a  Medicare AWV and comprehensive Health Risk Assessment today. The following components were reviewed and updated:  Patient accompanied by   , who was present throughout the visit and aided in information gathering.        Medical history  Family History  Social history  Allergies and Current Medications  Health Risk Assessment  Health Maintenance  Care Team         ** See Completed Assessments for Annual Wellness Visit within the encounter summary.**         The following assessments were completed:  Living Situation  CAGE  Depression Screening  Timed Get Up and Go  Whisper Test  Cognitive Function Screening  Nutrition Screening  ADL Screening  PAQ Screening        Vitals:    05/15/23 1326   BP: 134/65   BP Location: Left arm   Patient Position: Lying   Pulse: 78   Resp: 19   Temp: 98.8 °F (37.1 °C)   Weight: 64.5 kg (142 lb 3.2 oz)   Height: 5' 2" (1.575 m)     Body mass index is 26.01 kg/m².  Physical Exam  Vitals and nursing note reviewed.   Constitutional:       Appearance: Normal appearance. She is well-developed.   HENT:      Head: Normocephalic and atraumatic.   Eyes:      Pupils: Pupils are equal, round, and reactive to light.   Neck:      Vascular: No carotid bruit.   Cardiovascular:      Rate and Rhythm: Normal rate and regular rhythm.      Pulses: Normal pulses.      Heart sounds: Normal heart sounds. No murmur heard.    No gallop.   Pulmonary:      Effort: Pulmonary effort is normal.      Breath sounds: Normal breath sounds.   Abdominal:      General: Bowel sounds are normal. There is no distension.      Palpations: Abdomen is soft.      Tenderness: There is no abdominal tenderness.   Musculoskeletal:         General: No tenderness. Normal range of motion.   Feet:      Left foot:      Toenail Condition: Left toenails are ingrown.      Comments:  Ingrown hang nail noted- tender- no reddness or edema  Skin:     General: Skin is warm and dry.   Neurological:      " Mental Status: She is alert.      Motor: No abnormal muscle tone.      Gait: Gait normal.   Psychiatric:         Speech: Speech normal.         Behavior: Behavior normal.         Thought Content: Thought content normal.         Cognition and Memory: Memory is impaired.         Judgment: Judgment normal.      Comments: Pt appears     Current Outpatient Medications   Medication Instructions    anastrozole (ARIMIDEX) 1 mg, Oral, Daily    aspirin (ECOTRIN) 81 mg, Daily    calcium carbonate (OS-LÓPEZ) 500 mg, Oral, Daily    donepeziL (ARICEPT) 10 MG tablet TAKE ONE TABLET BY MOUTH ONE TIME DAILY    ondansetron (ZOFRAN-ODT) 8 mg, Oral, Every 8 hours PRN    pravastatin (PRAVACHOL) 40 MG tablet TAKE ONE TABLET BY MOUTH ONE TIME DAILY    traMADoL (ULTRAM) 50 mg, Oral, Every 6 hours PRN    vitamin D (VITAMIN D3) 1,000 Units, Oral, Daily             Diagnoses and health risks identified today and associated recommendations/orders:    1. Encounter for preventive health examination  Review for Opioid Screening: Patient does not have rx for Opioids.  Review for Substance Use Disorders: Patient does not use substance.    2. Malignant neoplasm of central portion of left breast in female, estrogen receptor positive  10/22 newly diagnosed left invasive duct cell carcinoma   11/15/22- Left simple breast mastectomy  Chronic/ Monitored/Stable on Arimidex as directed.  Followed by hem/oncologist    3. Immunodeficiency due to chemotherapy  Chronic/ Monitored/Stable blood counts  Followed by  oncologist    4. Dementia without behavioral disturbance  Chronic and Stable on Aricept- overall still active- no acute changes.followed by PCP    5. Stage 3b chronic kidney disease  Chronic/ Monitored/Stable on  as directed.  Followed by PCP    6. Hypertriglyceridemia  Chronic/ Monitored/Stable on   Pravastatin as directed.  Followed by PCP    7. History of CVA (cerebrovascular accident)  Chronic/ Monitored/Stable on  Pravastatin as directed.  Followed  by PCP    8. Ingrown nail   states he morally cut toenail nails but notice - hang nail to left nail-   Ambulatory referral/consult to Podiatry if rt great toe ingrown nail  continue    9. Osteoporosis due to aromatase inhibitor  Chronic/ Monitored/Stable-STARTED  Prolia as directed.  Followed by Oncologist    Provided Vanessa with a 5-10 year written screening schedule and personal prevention plan. Recommendations were developed using the USPSTF age appropriate recommendations. Education, counseling, and referrals were provided as needed. After Visit Summary printed and given to patient which includes a list of additional screenings\tests needed.    Follow up in about 1 year (around 5/15/2024).    Lorri Fairbanks NP

## 2023-05-15 NOTE — PATIENT INSTRUCTIONS
Counseling and Referral of Other Preventative  (Italic type indicates deductible and co-insurance are waived)    Patient Name: Vanessa Vazquez  Today's Date: 5/15/2023    Health Maintenance       Date Due Completion Date    COVID-19 Vaccine (4 - Booster for Pfizer series) 02/07/2022 12/13/2021    TETANUS VACCINE 08/12/2023 (Originally 1/3/2022) 1/3/2012    Shingles Vaccine (1 of 2) 08/12/2023 (Originally 7/3/1959) ---    Lipid Panel 08/12/2023 8/12/2022    Influenza Vaccine (Season Ended) 09/01/2023 10/19/2021    DEXA Scan 09/14/2025 9/14/2022        No orders of the defined types were placed in this encounter.    The following information is provided to all patients.  This information is to help you find resources for any of the problems found today that may be affecting your health:                Living healthy guide: www.[facility]      Understanding Diabetes: www.diabetes.org      Eating healthy: www.cdc.gov/healthyweight      CDC home safety checklist: www.cdc.gov/steadi/patient.html      Agency on Aging: www.goea.[place].[facility]      Alcoholics anonymous (AA): www.aa.org      Physical Activity: www.paul.nih.gov/bn7rida      Tobacco use: www.quitwithusla.org

## 2023-05-17 PROBLEM — C77.3 SECONDARY AND UNSPECIFIED MALIGNANT NEOPLASM OF AXILLA AND UPPER LIMB LYMPH NODES: Status: ACTIVE | Noted: 2023-05-17

## 2023-05-22 ENCOUNTER — OFFICE VISIT (OUTPATIENT)
Dept: PODIATRY | Facility: CLINIC | Age: 83
End: 2023-05-22
Payer: MEDICARE

## 2023-05-22 DIAGNOSIS — L60.0 ONYCHOCRYPTOSIS: Primary | ICD-10-CM

## 2023-05-22 PROCEDURE — 99999 PR PBB SHADOW E&M-EST. PATIENT-LVL III: CPT | Mod: PBBFAC,,, | Performed by: PODIATRIST

## 2023-05-22 PROCEDURE — 99202 PR OFFICE/OUTPT VISIT, NEW, LEVL II, 15-29 MIN: ICD-10-PCS | Mod: S$PBB,,, | Performed by: PODIATRIST

## 2023-05-22 PROCEDURE — 99202 OFFICE O/P NEW SF 15 MIN: CPT | Mod: S$PBB,,, | Performed by: PODIATRIST

## 2023-05-22 PROCEDURE — 99999 PR PBB SHADOW E&M-EST. PATIENT-LVL III: ICD-10-PCS | Mod: PBBFAC,,, | Performed by: PODIATRIST

## 2023-05-22 PROCEDURE — 99213 OFFICE O/P EST LOW 20 MIN: CPT | Mod: PBBFAC | Performed by: PODIATRIST

## 2023-05-22 NOTE — PROGRESS NOTES
Subjective:     Patient ID: Vanessa Vazquez is a 82 y.o. female.    Chief Complaint: Ingrown Toenail (Pt is here for an ingrown toenail on left great toe, non- diabetic, pain level is 0/10. Last seen Lorri Fairbanks NP on 5/15/23)    Vanessa is a 82 y.o. female who presents to the clinic complaining of painful ingrown toenail on the left foot. Patient denies pain with toenail. Patient is accompanied by her  who states patient will complain of pain time to time when walking. Patient's  states he does try to regularly cute his wife's toenails. Patient has no other pedal complaints at this time.     Patient Active Problem List   Diagnosis    Dementia without behavioral disturbance    Hypertriglyceridemia    Osteopenia    Hearing loss    Osteoarthritis of thumb    CKD (chronic kidney disease), stage III    Urinary incontinence    Malignant neoplasm of central portion of left breast in female, estrogen receptor positive    Decreased functional mobility    History of CVA (cerebrovascular accident)    Immunodeficiency due to chemotherapy    Osteoporosis due to aromatase inhibitor    Secondary and unspecified malignant neoplasm of axilla and upper limb lymph nodes       Medication List with Changes/Refills   Current Medications    ANASTROZOLE (ARIMIDEX) 1 MG TAB    Take 1 tablet (1 mg total) by mouth once daily.    ASPIRIN (ECOTRIN) 81 MG EC TABLET    Take 81 mg by mouth once daily.    CALCIUM CARBONATE (OS-LÓPEZ) 500 MG CALCIUM (1,250 MG) TABLET    Take 1 tablet (500 mg total) by mouth once daily.    DONEPEZIL (ARICEPT) 10 MG TABLET    TAKE ONE TABLET BY MOUTH ONE TIME DAILY    ONDANSETRON (ZOFRAN-ODT) 8 MG TBDL    Take 1 tablet (8 mg total) by mouth every 8 (eight) hours as needed (Nausea).    PRAVASTATIN (PRAVACHOL) 40 MG TABLET    TAKE ONE TABLET BY MOUTH ONE TIME DAILY    TRAMADOL (ULTRAM) 50 MG TABLET    Take 1 tablet (50 mg total) by mouth every 6 (six) hours as needed for Pain.    VITAMIN D (VITAMIN D3)  1000 UNITS TAB    Take 1,000 Units by mouth once daily.       Review of patient's allergies indicates:  No Known Allergies    Past Surgical History:   Procedure Laterality Date    BREAST BIOPSY Left 05/2018    Benign    CATARACT EXTRACTION EXTRACAPSULAR W/ INTRAOCULAR LENS IMPLANTATION      SENTINEL LYMPH NODE BIOPSY Left 11/15/2022    Procedure: BIOPSY, LYMPH NODE, SENTINEL;  Surgeon: Sabrina Leavitt MD;  Location: [facility];  Service: General;  Laterality: Left;    SIMPLE MASTECTOMY Left 11/15/2022    Procedure: MASTECTOMY, SIMPLE;  Surgeon: Sabrina Leavitt MD;  Location: [facility];  Service: General;  Laterality: Left;       Family History   Problem Relation Age of Onset    Glaucoma Brother     Hyperlipidemia Mother     Heart disease Father     Osteoporosis Cousin     Coronary artery disease Maternal Uncle        Social History     Socioeconomic History    Marital status:    Tobacco Use    Smoking status: Never    Smokeless tobacco: Never   Substance and Sexual Activity    Alcohol use: No    Drug use: No    Sexual activity: Not Currently   Social History Narrative    The patient is  and the mother of 2 adult children. She retired from [employer] as .  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  She drives occasionally.     Social Determinants of Health     Financial Resource Strain: Low Risk     Difficulty of Paying Living Expenses: Not hard at all   Food Insecurity: No Food Insecurity    Worried About Running Out of Food in the Last Year: Never true    Ran Out of Food in the Last Year: Never true   Transportation Needs: Unknown    Lack of Transportation (Medical): No   Physical Activity: Sufficiently Active    Days of Exercise per Week: 7 days    Minutes of Exercise per Session: 40 min   Stress: No Stress Concern Present    Feeling of Stress : Not at all   Social Connections: Socially Integrated    Frequency of  Communication with Friends and Family: More than three times a week    Frequency of Social Gatherings with Friends and Family: More than three times a week    Attends [employer] Services: More than 4 times per year    Active Member of Clubs or Organizations: No    Attends Club or Organization Meetings: More than 4 times per year    Marital Status:    Housing Stability: Unknown    Unable to Pay for Housing in the Last Year: No    Unstable Housing in the Last Year: No       Vitals:    05/22/23 1420   PainSc: 0-No pain       Review of Systems   Constitutional:  Negative for chills and fever.   Respiratory:  Negative for shortness of breath.    Cardiovascular:  Negative for chest pain, palpitations, orthopnea, claudication and leg swelling.   Gastrointestinal:  Negative for diarrhea, nausea and vomiting.   Musculoskeletal:  Negative for joint pain.   Skin:  Negative for rash.   Neurological:  Negative for dizziness, tingling, sensory change, focal weakness and weakness.   Psychiatric/Behavioral:  Positive for memory loss.            Objective:      PHYSICAL EXAM: Apperance: Alert and orient in no distress,well developed, and with good attention to grooming and body habits  Lower Extremity Exam   VASCULAR: Dorsalis pedis pulses 2/4 left and Posterior Tibial pulses 2/4 left. Capillary fill time <4 seconds left. No edema observed left.   DERMATOLOGICAL: No skin rash, subcutaneous nodules, lesions or ulcers observed. Left hallux and second nail observed to be mildly incurvated at  distal medial and lateral  borders and slight obstructed in the nail grooves with soft tissue.  No purulent drainage, no odor, and no increased temperature observed to left hallux or 2nd toe.   NEUROLOGICAL: Light touch, sharp-dull, proprioception all present and equal bilaterally.    MUSCULOSKELETAL: Muscle strength 5/5 for all foot inverters, everters, plantarflexors, and  dorsiflexors bilateral. No pain on palpation of left hallux or 2nd   distal medial and lateral  nail border. No pain on palpation of dorsal nail plate left hallux or 2nd toe.         Assessment:       ICD-10-CM ICD-9-CM   1. Onychocryptosis - Left Foot  L60.0 703.0       Plan:   Onychocryptosis - Left Foot  -     Ambulatory referral/consult to Podiatry      I counseled the patient on her conditions, regarding findings of my examination, my impressions, and usual treatment plan.   Conservatively we did discuss proper nail cutting for incurvated toenails. Surgically we briefly discussed temporary vs. permanent nail avulsion procedures with patient. The patient elects for conservative management at this time.   Patient to return as needed.          Guanako Pratt DPM  [facility]

## 2023-05-26 NOTE — PROGRESS NOTES
Breast Surgical Oncology  [facility]    Date of Service: 05/25/2023    DIAGNOSIS:   82 y.o. female with a history of left breast cancer.    Date of Diagnosis: 10/4/22  Pathology:  Invasive mammary carcinoma with lobular features  Clinical Stage:  Multicentric stage IA (cT1C cN0 MX)  Pathologic Stage:  Stage II A (pT3 pN1a MX)    TREATMENT:   Surgery: left total mastectomy with sentinel node biopsy on 11/15/22. Sabrina Leavitt M.D. Breast Surgical Oncology  Systemic Therapy: Endocrine therapy Arimidex from 1/11/23 to ongoing  Medical Oncologist: Zain Milton M.D.     Radiation Treatment Summary: PMRT simulation scheduled for 1/11/23  Radiation Oncologist: Rosa Carbajal M.D.     HISTORY OF PRESENT ILLNESS:   Vanessa Vazquez is here for oncological follow up.  Today, she denies new breast concerns such as pain, masses, skin changes, nipple discharge, nipple retraction or lumps under the arm.  She also denies bone pain, shortness of breath, abdominal pain and neurologic symptoms.     IMAGING:   No new     MEDICATIONS/ALLERGIES:     Current Outpatient Medications:     anastrozole (ARIMIDEX) 1 mg Tab, Take 1 tablet (1 mg total) by mouth once daily., Disp: 90 tablet, Rfl: 3    aspirin (ECOTRIN) 81 MG EC tablet, Take 81 mg by mouth once daily., Disp: , Rfl:     calcium carbonate (OS-LÓPEZ) 500 mg calcium (1,250 mg) tablet, Take 1 tablet (500 mg total) by mouth once daily., Disp: 90 tablet, Rfl: 3    donepeziL (ARICEPT) 10 MG tablet, TAKE ONE TABLET BY MOUTH ONE TIME DAILY (Patient taking differently: every evening.), Disp: 30 tablet, Rfl: 11    ondansetron (ZOFRAN-ODT) 8 MG TbDL, Take 1 tablet (8 mg total) by mouth every 8 (eight) hours as needed (Nausea)., Disp: 10 tablet, Rfl: 0    pravastatin (PRAVACHOL) 40 MG tablet, TAKE ONE TABLET BY MOUTH ONE TIME DAILY, Disp: 90 tablet, Rfl: 3    traMADoL (ULTRAM) 50 mg tablet, Take 1 tablet (50 mg total) by mouth every 6 (six) hours as needed for Pain. (Patient not taking:  Reported on 5/15/2023), Disp: 10 tablet, Rfl: 0    vitamin D (VITAMIN D3) 1000 units Tab, Take 1,000 Units by mouth once daily., Disp: , Rfl:   Review of patient's allergies indicates:  No Known Allergies    PHYSICAL EXAM:   General: The patient appears well and is in no acute distress.     BREAST EXAM  No Asymmetry  Right:  - Mass: No  - Skin change: No  - Nipple Discharge: No  - Nipple retraction: No  - Axillary LAD: No  Left: mastectomy   - Mass: No  - Skin change: post XRT changes  - Axillary LAD: No  Full range of motion left upper extremity    ASSESSMENT:   Diagnoses and all orders for this visit:    Malignant neoplasm of central portion of left breast in female, estrogen receptor positive       PLAN:   Vanessa has a benign exam today without evidence of local or distant relapse.    She will return in November for her 1 year follow up visit, or sooner should she develop breast concerns.    Right breast screening mammogram will be ordered for her return visit.    The patient is in agreement with the plan. Questions were encouraged and answered to patient's satisfaction. Vanessa will call our office with any questions or concerns.     I spent a total of 25 minutes on this visit. This includes face to face time and non-face to face time preparing to see the patient (eg, review of tests), obtaining and/or reviewing separately obtained history, documenting clinical information in the electronic or other health record, independently interpreting results and communicating results to the patient/family/caregiver, or care coordinator.       Sabrina Leavitt M.D. were

## 2023-05-29 ENCOUNTER — OFFICE VISIT (OUTPATIENT)
Dept: SURGERY | Facility: CLINIC | Age: 83
End: 2023-05-29
Payer: MEDICARE

## 2023-05-29 DIAGNOSIS — Z12.31 ENCOUNTER FOR SCREENING MAMMOGRAM FOR MALIGNANT NEOPLASM OF BREAST: Primary | ICD-10-CM

## 2023-05-29 DIAGNOSIS — Z17.0 MALIGNANT NEOPLASM OF CENTRAL PORTION OF LEFT BREAST IN FEMALE, ESTROGEN RECEPTOR POSITIVE: ICD-10-CM

## 2023-05-29 DIAGNOSIS — C50.112 MALIGNANT NEOPLASM OF CENTRAL PORTION OF LEFT BREAST IN FEMALE, ESTROGEN RECEPTOR POSITIVE: ICD-10-CM

## 2023-05-29 PROCEDURE — 99213 PR OFFICE/OUTPT VISIT, EST, LEVL III, 20-29 MIN: ICD-10-PCS | Mod: S$PBB,,, | Performed by: SURGERY

## 2023-05-29 PROCEDURE — 99213 OFFICE O/P EST LOW 20 MIN: CPT | Mod: S$PBB,,, | Performed by: SURGERY

## 2023-05-31 ENCOUNTER — EXTERNAL CHRONIC CARE MANAGEMENT (OUTPATIENT)
Dept: PRIMARY CARE CLINIC | Facility: CLINIC | Age: 83
End: 2023-05-31
Payer: MEDICARE

## 2023-05-31 PROCEDURE — 99490 PR CHRONIC CARE MGMT, 1ST 20 MIN: ICD-10-PCS | Mod: S$PBB,,, | Performed by: FAMILY MEDICINE

## 2023-05-31 PROCEDURE — 99439 CHRNC CARE MGMT STAF EA ADDL: CPT | Mod: S$PBB,,, | Performed by: FAMILY MEDICINE

## 2023-05-31 PROCEDURE — 99490 CHRNC CARE MGMT STAFF 1ST 20: CPT | Mod: PBBFAC,PO | Performed by: FAMILY MEDICINE

## 2023-05-31 PROCEDURE — 99490 CHRNC CARE MGMT STAFF 1ST 20: CPT | Mod: S$PBB,,, | Performed by: FAMILY MEDICINE

## 2023-05-31 PROCEDURE — 99439 PR CHRONIC CARE MGMT, EA ADDTL 20 MIN: ICD-10-PCS | Mod: S$PBB,,, | Performed by: FAMILY MEDICINE

## 2023-05-31 PROCEDURE — 99439 CHRNC CARE MGMT STAF EA ADDL: CPT | Mod: PBBFAC,27,PO | Performed by: FAMILY MEDICINE

## 2023-06-30 ENCOUNTER — EXTERNAL CHRONIC CARE MANAGEMENT (OUTPATIENT)
Dept: PRIMARY CARE CLINIC | Facility: CLINIC | Age: 83
End: 2023-06-30
Payer: MEDICARE

## 2023-06-30 PROCEDURE — 99490 CHRNC CARE MGMT STAFF 1ST 20: CPT | Mod: PBBFAC,25,PO | Performed by: FAMILY MEDICINE

## 2023-06-30 PROCEDURE — 99490 CHRNC CARE MGMT STAFF 1ST 20: CPT | Mod: S$PBB,,, | Performed by: FAMILY MEDICINE

## 2023-06-30 PROCEDURE — 99439 CHRNC CARE MGMT STAF EA ADDL: CPT | Mod: PBBFAC,27,PO | Performed by: FAMILY MEDICINE

## 2023-06-30 PROCEDURE — 99439 CHRNC CARE MGMT STAF EA ADDL: CPT | Mod: S$PBB,,, | Performed by: FAMILY MEDICINE

## 2023-06-30 PROCEDURE — 99490 PR CHRONIC CARE MGMT, 1ST 20 MIN: ICD-10-PCS | Mod: S$PBB,,, | Performed by: FAMILY MEDICINE

## 2023-06-30 PROCEDURE — 99439 PR CHRONIC CARE MGMT, EA ADDTL 20 MIN: ICD-10-PCS | Mod: S$PBB,,, | Performed by: FAMILY MEDICINE

## 2023-07-31 ENCOUNTER — EXTERNAL CHRONIC CARE MANAGEMENT (OUTPATIENT)
Dept: PRIMARY CARE CLINIC | Facility: CLINIC | Age: 83
End: 2023-07-31
Payer: MEDICARE

## 2023-07-31 PROCEDURE — 99490 CHRNC CARE MGMT STAFF 1ST 20: CPT | Mod: S$PBB,,, | Performed by: FAMILY MEDICINE

## 2023-07-31 PROCEDURE — 99490 CHRNC CARE MGMT STAFF 1ST 20: CPT | Mod: PBBFAC,PO | Performed by: FAMILY MEDICINE

## 2023-07-31 PROCEDURE — 99490 PR CHRONIC CARE MGMT, 1ST 20 MIN: ICD-10-PCS | Mod: S$PBB,,, | Performed by: FAMILY MEDICINE

## 2023-08-08 ENCOUNTER — TELEPHONE (OUTPATIENT)
Dept: OPHTHALMOLOGY | Facility: CLINIC | Age: 83
End: 2023-08-08
Payer: MEDICARE

## 2023-08-08 NOTE — TELEPHONE ENCOUNTER
The patient's yearly appointment has been scheduled for 9/29/23 at 1:40 pm at the [place] location to see Dr. iVcente.    ----- Message from Zoila Rose sent at 8/8/2023  4:32 PM CDT -----  Contact: Renan,   Pt  Renan is calling to get pt scheduled for appt. Please give Renan a call back at .[phone]. Thanks AMINATA.

## 2023-08-15 ENCOUNTER — OFFICE VISIT (OUTPATIENT)
Dept: FAMILY MEDICINE | Facility: CLINIC | Age: 83
End: 2023-08-15
Payer: MEDICARE

## 2023-08-15 ENCOUNTER — LAB VISIT (OUTPATIENT)
Dept: LAB | Facility: HOSPITAL | Age: 83
End: 2023-08-15
Attending: FAMILY MEDICINE
Payer: MEDICARE

## 2023-08-15 VITALS
OXYGEN SATURATION: 97 % | DIASTOLIC BLOOD PRESSURE: 70 MMHG | HEIGHT: 62 IN | BODY MASS INDEX: 25.6 KG/M2 | HEART RATE: 72 BPM | SYSTOLIC BLOOD PRESSURE: 130 MMHG | WEIGHT: 139.13 LBS | TEMPERATURE: 98 F

## 2023-08-15 DIAGNOSIS — C50.112 MALIGNANT NEOPLASM OF CENTRAL PORTION OF LEFT BREAST IN FEMALE, ESTROGEN RECEPTOR POSITIVE: ICD-10-CM

## 2023-08-15 DIAGNOSIS — N18.32 STAGE 3B CHRONIC KIDNEY DISEASE: Chronic | ICD-10-CM

## 2023-08-15 DIAGNOSIS — E78.1 HYPERTRIGLYCERIDEMIA: Chronic | ICD-10-CM

## 2023-08-15 DIAGNOSIS — F03.90 DEMENTIA WITHOUT BEHAVIORAL DISTURBANCE: Primary | Chronic | ICD-10-CM

## 2023-08-15 DIAGNOSIS — Z17.0 MALIGNANT NEOPLASM OF CENTRAL PORTION OF LEFT BREAST IN FEMALE, ESTROGEN RECEPTOR POSITIVE: ICD-10-CM

## 2023-08-15 LAB
ALBUMIN SERPL BCP-MCNC: 3.9 G/DL (ref 3.5–5.2)
ALP SERPL-CCNC: 53 U/L (ref 55–135)
ALT SERPL W/O P-5'-P-CCNC: 19 U/L (ref 10–44)
ANION GAP SERPL CALC-SCNC: 10 MMOL/L (ref 8–16)
AST SERPL-CCNC: 22 U/L (ref 10–40)
BASOPHILS # BLD AUTO: 0.11 K/UL (ref 0–0.2)
BASOPHILS NFR BLD: 1.7 % (ref 0–1.9)
BILIRUB SERPL-MCNC: 0.3 MG/DL (ref 0.1–1)
BUN SERPL-MCNC: 12 MG/DL (ref 8–23)
CALCIUM SERPL-MCNC: 9.8 MG/DL (ref 8.7–10.5)
CHLORIDE SERPL-SCNC: 108 MMOL/L (ref 95–110)
CHOLEST SERPL-MCNC: 179 MG/DL (ref 120–199)
CHOLEST/HDLC SERPL: 4.3 {RATIO} (ref 2–5)
CO2 SERPL-SCNC: 23 MMOL/L (ref 23–29)
CREAT SERPL-MCNC: 1 MG/DL (ref 0.5–1.4)
DIFFERENTIAL METHOD: ABNORMAL
EOSINOPHIL # BLD AUTO: 0.3 K/UL (ref 0–0.5)
EOSINOPHIL NFR BLD: 4.6 % (ref 0–8)
ERYTHROCYTE [DISTWIDTH] IN BLOOD BY AUTOMATED COUNT: 14.1 % (ref 11.5–14.5)
EST. GFR  (NO RACE VARIABLE): 55.9 ML/MIN/1.73 M^2
GLUCOSE SERPL-MCNC: 102 MG/DL (ref 70–110)
HCT VFR BLD AUTO: 38.3 % (ref 37–48.5)
HDLC SERPL-MCNC: 42 MG/DL (ref 40–75)
HDLC SERPL: 23.5 % (ref 20–50)
HGB BLD-MCNC: 12.3 G/DL (ref 12–16)
IMM GRANULOCYTES # BLD AUTO: 0.03 K/UL (ref 0–0.04)
IMM GRANULOCYTES NFR BLD AUTO: 0.5 % (ref 0–0.5)
LDLC SERPL CALC-MCNC: 105.4 MG/DL (ref 63–159)
LYMPHOCYTES # BLD AUTO: 1.1 K/UL (ref 1–4.8)
LYMPHOCYTES NFR BLD: 17.3 % (ref 18–48)
MCH RBC QN AUTO: 29.1 PG (ref 27–31)
MCHC RBC AUTO-ENTMCNC: 32.1 G/DL (ref 32–36)
MCV RBC AUTO: 91 FL (ref 82–98)
MONOCYTES # BLD AUTO: 0.5 K/UL (ref 0.3–1)
MONOCYTES NFR BLD: 7 % (ref 4–15)
NEUTROPHILS # BLD AUTO: 4.5 K/UL (ref 1.8–7.7)
NEUTROPHILS NFR BLD: 68.9 % (ref 38–73)
NONHDLC SERPL-MCNC: 137 MG/DL
NRBC BLD-RTO: 0 /100 WBC
PLATELET # BLD AUTO: 276 K/UL (ref 150–450)
PMV BLD AUTO: 10.5 FL (ref 9.2–12.9)
POTASSIUM SERPL-SCNC: 4.1 MMOL/L (ref 3.5–5.1)
PROT SERPL-MCNC: 7.6 G/DL (ref 6–8.4)
RBC # BLD AUTO: 4.23 M/UL (ref 4–5.4)
SODIUM SERPL-SCNC: 141 MMOL/L (ref 136–145)
TRIGL SERPL-MCNC: 158 MG/DL (ref 30–150)
TSH SERPL DL<=0.005 MIU/L-ACNC: 3.52 UIU/ML (ref 0.4–4)
WBC # BLD AUTO: 6.53 K/UL (ref 3.9–12.7)

## 2023-08-15 PROCEDURE — 36415 COLL VENOUS BLD VENIPUNCTURE: CPT | Mod: PO | Performed by: FAMILY MEDICINE

## 2023-08-15 PROCEDURE — 80061 LIPID PANEL: CPT | Performed by: FAMILY MEDICINE

## 2023-08-15 PROCEDURE — 85025 COMPLETE CBC W/AUTO DIFF WBC: CPT | Performed by: FAMILY MEDICINE

## 2023-08-15 PROCEDURE — 99999 PR PBB SHADOW E&M-EST. PATIENT-LVL IV: CPT | Mod: PBBFAC,,, | Performed by: FAMILY MEDICINE

## 2023-08-15 PROCEDURE — 99999 PR PBB SHADOW E&M-EST. PATIENT-LVL IV: ICD-10-PCS | Mod: PBBFAC,,, | Performed by: FAMILY MEDICINE

## 2023-08-15 PROCEDURE — 84443 ASSAY THYROID STIM HORMONE: CPT | Performed by: FAMILY MEDICINE

## 2023-08-15 PROCEDURE — 99214 OFFICE O/P EST MOD 30 MIN: CPT | Mod: PBBFAC,PO | Performed by: FAMILY MEDICINE

## 2023-08-15 PROCEDURE — 80053 COMPREHEN METABOLIC PANEL: CPT | Performed by: FAMILY MEDICINE

## 2023-08-15 PROCEDURE — 99214 PR OFFICE/OUTPT VISIT, EST, LEVL IV, 30-39 MIN: ICD-10-PCS | Mod: S$PBB,,, | Performed by: FAMILY MEDICINE

## 2023-08-15 PROCEDURE — 99214 OFFICE O/P EST MOD 30 MIN: CPT | Mod: S$PBB,,, | Performed by: FAMILY MEDICINE

## 2023-08-15 NOTE — PROGRESS NOTES
CHIEF COMPLAINT:  This is a 83-year-old female here for follow up chronic medical conditions and for preventive health exam.     SUBJECTIVE: Patient reports that she is doing well and has no complaints. She is accompanied with her . The patient is status post mastectomy in November 2022 for left breast cancer followed by radiation. She is currently taking Arimidex. She continues to take Aricept for dementia.  She has chronic kidney disease stage 3 and hypertriglyceridemia.  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  She exercises by walking with her  around the neighborhood.  Her  reports that she has urinary incontinence but the patient does not feel that this problem occurs often enough to wear adult diapers.  The patient has osteopenia.  Bone DEXA scan in July 2020 showed improved bone mineral density in spine and stable bone mineral density in hip.  She has been on a drug holiday from Fosamax since 2017.     Eye exam September 2022. Mammogram October 2022. Bone DEXA scan September 2022.  Pap smear October 2009.  Colonoscopy January 2009. Tdap January 2012. Pneumovax October 2008.  Prevnar July 2015.  Influenza vaccine October 2021. COVID 19 vaccine January, February, December 2021.     ROS:  GENERAL: Patient denies fever, chills, night sweats. Patient denies weight gain or loss. Patient denies anorexia, fatigue, weakness or swollen glands.  SKIN: Patient denies rash or hair loss.  HEENT: Patient denies sore throat, ear pain, hearing loss, nasal congestion, or runny nose. Patient denies visual disturbance, eye irritation or discharge.  LUNGS: Patient denies cough, wheeze or hemoptysis.  CARDIOVASCULAR: Patient denies chest pain, shortness of breath, palpitations, syncope or lower extremity edema.  GI: Patient denies abdominal pain, nausea, vomiting, diarrhea, constipation, blood in stool or  melena.  GENITOURINARY: Patient denies pelvic pain, vaginal discharge, itch or odor. Patient denies irregular vaginal bleeding. Patient denies dysuria, frequency, hematuria, nocturia, urgency or incontinence.  BREASTS: Patient denies breast pain, mass or nipple discharge.  MUSCULOSKELETAL: Patient denies joint pain, swelling, redness or warmth.  NEUROLOGIC: Patient denies headache, vertigo, paresthesias, weakness in limb, dysarthria, dysphagia or abnormality of gait.  PSYCHIATRIC: Patient denies anxiety, depression, or memory loss.     OBJECTIVE:   GENERAL: Well-developed well-nourished, overweight elderly, white female alert and oriented x3, in no acute distress.  Mild to moderate cognitive impairment.  Repeats herself frequently.  No hallucinations or delusions.   SKIN: Clear without rash. Normal color and tone.  Dry skin.  Multiple SKs.  HEENT: Eyes: Clear conjunctivae. No scleral icterus. Pupils equal reactive to light and accommodation. Ears: Hearing aids. Clear TMs. Clear canals. Nose: Without congestion. Pharynx: Without injection or exudates.  NECK: Supple, normal range of motion. No masses, lymphadenopathy or enlarged thyroid. No JVD. Carotids 2+ and equal. No bruits.  LUNGS: Clear to auscultation. Normal respiratory effort.  CARDIOVASCULAR: Regular rhythm, normal S1, S2 without murmur, gallop or rub.  BACK: No CVA or spinal tenderness.  BREASTS: No masses, tenderness or nipple discharge right breast.  Incision left chest well healed.  ABDOMEN: Normal appearance. Active bowel sounds. Soft, nontender without mass or organomegaly. No rebound or guarding.  EXTREMITIES: Without cyanosis, clubbing or edema. Distal pulses 2+ and equal. Normal range of motion in all extremities. No joint effusion, erythema or warmth.  NEUROLOGIC: Cranial nerves II through XII without deficit. Motor strength equal bilaterally. Sensation normal to touch. Deep tendon reflexes 2+ and equal. Gait without abnormality. No tremor.  Negative cerebellar signs.   PELVIC:  Deferred to 2024.    ASSESSMENT:  1. Dementia without behavioral disturbance    2. Stage 3b chronic kidney disease    3. Hypertriglyceridemia    4. Malignant neoplasm of central portion of left breast in female, estrogen receptor positive      PLAN:  1. Exercise regularly.  2. Age-appropriate counseling.  3. Fasting lab.  4. Refill medications as needed.    5.  Follow-up in 6-12 months.    30 minutes of total time spent on the encounter, which includes face to face time and non-face to face time preparing to see the patient (eg, review of tests), Obtaining and/or reviewing separately obtained history, Documenting clinical information in the electronic or other health record, Independently interpreting results (not separately reported) and communicating results to the patient/family/caregiver, or Care coordination (not separately reported).     This note is generated with speech recognition software and is subject to transcription error and sound alike phrases that may be missed by proofreading.

## 2023-08-30 RX ORDER — DONEPEZIL HYDROCHLORIDE 10 MG/1
10 TABLET, FILM COATED ORAL NIGHTLY
Qty: 30 TABLET | Refills: 11 | Status: SHIPPED | OUTPATIENT
Start: 2023-08-30

## 2023-08-31 ENCOUNTER — EXTERNAL CHRONIC CARE MANAGEMENT (OUTPATIENT)
Dept: PRIMARY CARE CLINIC | Facility: CLINIC | Age: 83
End: 2023-08-31
Payer: MEDICARE

## 2023-08-31 PROCEDURE — 99490 PR CHRONIC CARE MGMT, 1ST 20 MIN: ICD-10-PCS | Mod: S$PBB,,, | Performed by: FAMILY MEDICINE

## 2023-08-31 PROCEDURE — 99490 CHRNC CARE MGMT STAFF 1ST 20: CPT | Mod: PBBFAC,PO | Performed by: FAMILY MEDICINE

## 2023-08-31 PROCEDURE — 99490 CHRNC CARE MGMT STAFF 1ST 20: CPT | Mod: S$PBB,,, | Performed by: FAMILY MEDICINE

## 2023-09-20 DIAGNOSIS — Z17.0 MALIGNANT NEOPLASM OF CENTRAL PORTION OF LEFT BREAST IN FEMALE, ESTROGEN RECEPTOR POSITIVE: Primary | ICD-10-CM

## 2023-09-20 DIAGNOSIS — C50.112 MALIGNANT NEOPLASM OF CENTRAL PORTION OF LEFT BREAST IN FEMALE, ESTROGEN RECEPTOR POSITIVE: Primary | ICD-10-CM

## 2023-09-29 ENCOUNTER — TELEPHONE (OUTPATIENT)
Dept: SURGERY | Facility: CLINIC | Age: 83
End: 2023-09-29
Payer: MEDICARE

## 2023-09-29 ENCOUNTER — OFFICE VISIT (OUTPATIENT)
Dept: OPHTHALMOLOGY | Facility: CLINIC | Age: 83
End: 2023-09-29
Payer: MEDICARE

## 2023-09-29 ENCOUNTER — HOSPITAL ENCOUNTER (OUTPATIENT)
Dept: RADIOLOGY | Facility: HOSPITAL | Age: 83
Discharge: HOME | End: 2023-09-29
Attending: SURGERY
Payer: MEDICARE

## 2023-09-29 DIAGNOSIS — Z17.0 MALIGNANT NEOPLASM OF CENTRAL PORTION OF LEFT BREAST IN FEMALE, ESTROGEN RECEPTOR POSITIVE: ICD-10-CM

## 2023-09-29 DIAGNOSIS — C50.112 MALIGNANT NEOPLASM OF CENTRAL PORTION OF LEFT BREAST IN FEMALE, ESTROGEN RECEPTOR POSITIVE: ICD-10-CM

## 2023-09-29 DIAGNOSIS — Z96.1 PSEUDOPHAKIA OF BOTH EYES: Primary | ICD-10-CM

## 2023-09-29 PROCEDURE — 99999 PR PBB SHADOW E&M-EST. PATIENT-LVL II: CPT | Mod: PBBFAC,,, | Performed by: OPTOMETRIST

## 2023-09-29 PROCEDURE — 99212 OFFICE O/P EST SF 10 MIN: CPT | Mod: PBBFAC,25 | Performed by: OPTOMETRIST

## 2023-09-29 PROCEDURE — 99999 PR PBB SHADOW E&M-EST. PATIENT-LVL II: ICD-10-PCS | Mod: PBBFAC,,, | Performed by: OPTOMETRIST

## 2023-09-29 PROCEDURE — 92014 COMPRE OPH EXAM EST PT 1/>: CPT | Mod: S$PBB,,, | Performed by: OPTOMETRIST

## 2023-09-29 PROCEDURE — 92014 PR EYE EXAM, EST PATIENT,COMPREHESV: ICD-10-PCS | Mod: S$PBB,,, | Performed by: OPTOMETRIST

## 2023-09-29 PROCEDURE — 71045 X-RAY EXAM CHEST 1 VIEW: CPT | Mod: 26,,, | Performed by: RADIOLOGY

## 2023-09-29 PROCEDURE — 71045 XR CHEST 1 VIEW: ICD-10-PCS | Mod: 26,,, | Performed by: RADIOLOGY

## 2023-09-29 PROCEDURE — 71045 X-RAY EXAM CHEST 1 VIEW: CPT | Mod: TC

## 2023-09-29 NOTE — TELEPHONE ENCOUNTER
Spoke with PT Spouse Renan Vazquez regarding her appointment. I advised that her future appointment for 11/30/2023 will need to be rescheduled. New appointment time and date was offered and Mr Vazquez accepted new  appointment offer for 12/07/2023 for 3:30 PM at [facility]. Mr. Vazquez verbalized understanding.

## 2023-09-29 NOTE — PROGRESS NOTES
HPI     Annual Exam            Comments: Vision changes since last eye exam?: No changes    Any eye pain today: No    Other ocular symptoms: No    Interested in contact lens fitting today? No                     Comments    DNL pt    1. PC IOL OD 11/17/2021  PCIOL OS 1/19/22 (+27.5 [id number])  2. Fx Glaucoma-brother            Last edited by Ramirez Mendenhall on 9/29/2023  1:17 PM.            Assessment /Plan     For exam results, see Encounter Report.    Pseudophakia of both eyes    Stable OU  Monitor 12 months    Spec Rx is optional, ok to continue with OTC readers      RTC 1 yr for dilated eye exam or PRN if any problems.   Discussed above and answered questions.

## 2023-09-30 ENCOUNTER — EXTERNAL CHRONIC CARE MANAGEMENT (OUTPATIENT)
Dept: PRIMARY CARE CLINIC | Facility: CLINIC | Age: 83
End: 2023-09-30
Payer: MEDICARE

## 2023-09-30 PROCEDURE — 99490 PR CHRONIC CARE MGMT, 1ST 20 MIN: ICD-10-PCS | Mod: S$PBB,,, | Performed by: FAMILY MEDICINE

## 2023-09-30 PROCEDURE — 99490 CHRNC CARE MGMT STAFF 1ST 20: CPT | Mod: S$PBB,,, | Performed by: FAMILY MEDICINE

## 2023-09-30 PROCEDURE — 99490 CHRNC CARE MGMT STAFF 1ST 20: CPT | Mod: PBBFAC,PO | Performed by: FAMILY MEDICINE

## 2023-10-09 ENCOUNTER — OFFICE VISIT (OUTPATIENT)
Dept: HEMATOLOGY/ONCOLOGY | Facility: CLINIC | Age: 83
End: 2023-10-09
Payer: MEDICARE

## 2023-10-09 ENCOUNTER — INFUSION (OUTPATIENT)
Dept: INFUSION THERAPY | Facility: HOSPITAL | Age: 83
End: 2023-10-09
Attending: INTERNAL MEDICINE
Payer: MEDICARE

## 2023-10-09 VITALS
HEIGHT: 62 IN | HEART RATE: 70 BPM | OXYGEN SATURATION: 98 % | WEIGHT: 140 LBS | DIASTOLIC BLOOD PRESSURE: 78 MMHG | TEMPERATURE: 97 F | BODY MASS INDEX: 25.76 KG/M2 | SYSTOLIC BLOOD PRESSURE: 141 MMHG

## 2023-10-09 DIAGNOSIS — M81.8 OSTEOPOROSIS DUE TO AROMATASE INHIBITOR: Primary | ICD-10-CM

## 2023-10-09 DIAGNOSIS — T38.6X5A OSTEOPOROSIS DUE TO AROMATASE INHIBITOR: Primary | ICD-10-CM

## 2023-10-09 DIAGNOSIS — Z17.0 MALIGNANT NEOPLASM OF CENTRAL PORTION OF LEFT BREAST IN FEMALE, ESTROGEN RECEPTOR POSITIVE: Primary | ICD-10-CM

## 2023-10-09 DIAGNOSIS — M85.859 OSTEOPENIA OF HIP, UNSPECIFIED LATERALITY: ICD-10-CM

## 2023-10-09 DIAGNOSIS — C50.112 MALIGNANT NEOPLASM OF CENTRAL PORTION OF LEFT BREAST IN FEMALE, ESTROGEN RECEPTOR POSITIVE: Primary | ICD-10-CM

## 2023-10-09 PROCEDURE — 63600175 PHARM REV CODE 636 W HCPCS: Mod: JZ,JG | Performed by: INTERNAL MEDICINE

## 2023-10-09 PROCEDURE — 99999 PR PBB SHADOW E&M-EST. PATIENT-LVL III: ICD-10-PCS | Mod: PBBFAC,,, | Performed by: NURSE PRACTITIONER

## 2023-10-09 PROCEDURE — 99999 PR PBB SHADOW E&M-EST. PATIENT-LVL III: CPT | Mod: PBBFAC,,, | Performed by: NURSE PRACTITIONER

## 2023-10-09 PROCEDURE — 96372 THER/PROPH/DIAG INJ SC/IM: CPT

## 2023-10-09 PROCEDURE — 99215 PR OFFICE/OUTPT VISIT, EST, LEVL V, 40-54 MIN: ICD-10-PCS | Mod: 25,S$PBB,, | Performed by: NURSE PRACTITIONER

## 2023-10-09 PROCEDURE — 99213 OFFICE O/P EST LOW 20 MIN: CPT | Mod: 25,PBBFAC | Performed by: NURSE PRACTITIONER

## 2023-10-09 PROCEDURE — 99215 OFFICE O/P EST HI 40 MIN: CPT | Mod: 25,S$PBB,, | Performed by: NURSE PRACTITIONER

## 2023-10-09 RX ADMIN — DENOSUMAB 60 MG: 60 INJECTION SUBCUTANEOUS at 02:10

## 2023-10-09 NOTE — PROGRESS NOTES
Subjective:       Patient ID: Vanessa Vazquez is a 83 y.o. female.    Chief Complaint:   1. Malignant neoplasm of central portion of left breast in female, estrogen receptor positive  Stage IB (pT3, pN1(sn), cM0, G1, ER+, LA+, HER2-, Oncotype DX score: 13)      2. Osteopenia of hip, unspecified laterality          Current Treatment:  OP ANASTROZOLE DAILY started in 1/2023    DENOSUMAB (PROLIA) Q6M     Treatment History:  S/p mastectomy on 11/15/2022 by Dr. Leavitt        S/p XRT     HPI: This is an 83 year old  woman with osteopenia, CVA, hearing loss, stage 3 CKD, fibrocystic breast disease, hyperlipidemia, dementia, and osteoarthritis of her thumb who is seen in Hem/Onc for Stage IB breast cancer. Screening MMG in 9/2022 revealed an asymmetry/mass with architectural distortion persists in the upper inner breast. US demonstrated an irregular hypoechoic mass measuring 1.7 x 1.2 cm at the 10 o'clock position. Biopsy proved IDC, grade 1, ER >95%, LA >95%, Her2 negative. She underwent mastectomy on 11/15/2022 by Dr. Leavitt; pathology showed IDC, grade 1, 5cm, negative for LVI. 1/2 nodes positive for carcinoma, 2mm. Oncotype recurrence score is 13.     She was started on Arimidex 1mg po daily in 1/2023 to complete 10 years of therapy. DEXA in 9/2022 showed osteopenia, and she was started on Prolia every 6 months.     Her primary Hematologist/Oncologist is Dr. Milton.    Interval History: Patient presents for follow up on Arimidex and Prolia; She is scheduled to receive C2D1 of Prolia today. She presents with her  and has no complaints today. She reports adherence to Arimidex and calcium + vitamin D and denies hot flashes and joint pain. CBC unremarkable. CMP stable; Ca 9.3, adequate for Prolia. Will proceed. Surveillance MMG due 10/2023, scheduled for 12/2023 with follow up with Dr. Leavitt to decrease the number of appointments.     Reviewed labs with patient:   CBC:   Recent Labs   Lab 10/09/23  1334    WBC 7.18   RBC 4.27   Hemoglobin 12.7   Hematocrit 38.1   Platelets 289   MCV 89   MCH 29.7   MCHC 33.3     CMP:  Recent Labs   Lab 10/09/23  1334   Glucose 151 H   Calcium 9.3   Albumin 4.0   Total Protein 7.6   Sodium 140   Potassium 4.4   CO2 23   Chloride 107   BUN 15   Creatinine 1.1   Alkaline Phosphatase 51 L   ALT 22   AST 18   Total Bilirubin 0.4       Social History     Socioeconomic History    Marital status:    Tobacco Use    Smoking status: Never    Smokeless tobacco: Never   Substance and Sexual Activity    Alcohol use: No    Drug use: No    Sexual activity: Not Currently   Social History Narrative    The patient is  and the mother of 2 adult children. She retired from [employer] as .  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  She drives occasionally.     Social Determinants of Health     Financial Resource Strain: Low Risk  (5/15/2023)    Overall Financial Resource Strain (CARDIA)     Difficulty of Paying Living Expenses: Not hard at all   Food Insecurity: No Food Insecurity (5/15/2023)    Hunger Vital Sign     Worried About Running Out of Food in the Last Year: Never true     Ran Out of Food in the Last Year: Never true   Transportation Needs: Unknown (5/15/2023)    PRAPARE - Transportation     Lack of Transportation (Medical): No   Physical Activity: Sufficiently Active (5/15/2023)    Exercise Vital Sign     Days of Exercise per Week: 7 days     Minutes of Exercise per Session: 40 min   Stress: No Stress Concern Present (5/15/2023)    [employer] [place] [employer] - Occupational Stress Questionnaire     Feeling of Stress : Not at all   Social Connections: Socially Integrated (5/15/2023)    Social Connection and Isolation Panel [NHANES]     Frequency of Communication with Friends and Family: More than three times a week     Frequency of Social Gatherings with Friends and Family: More than  three times a week     Attends Nondenominational Services: More than 4 times per year     Active Member of Clubs or Organizations: No     Attends Club or Organization Meetings: More than 4 times per year     Marital Status:    Housing Stability: Unknown (5/15/2023)    Housing Stability Vital Sign     Unable to Pay for Housing in the Last Year: No     Unstable Housing in the Last Year: No     Past Medical History:   Diagnosis Date    Breast cancer     Left    CKD (chronic kidney disease), stage III     Dementia     Fibrocystic breast disease     Hearing loss     Hyperlipidemia     Osteoarthritis of thumb     Osteopenia     Stroke     Incidental finding in 2012.     Family History   Problem Relation Age of Onset    Glaucoma Brother     Hyperlipidemia Mother     Heart disease Father     Osteoporosis Cousin     Coronary artery disease Maternal Uncle      Past Surgical History:   Procedure Laterality Date    BREAST BIOPSY Left 05/2018    Benign    CATARACT EXTRACTION EXTRACAPSULAR W/ INTRAOCULAR LENS IMPLANTATION      SENTINEL LYMPH NODE BIOPSY Left 11/15/2022    Procedure: BIOPSY, LYMPH NODE, SENTINEL;  Surgeon: Sabrina Leavitt MD;  Location: [facility];  Service: General;  Laterality: Left;    SIMPLE MASTECTOMY Left 11/15/2022    Procedure: MASTECTOMY, SIMPLE;  Surgeon: Sabrina Leavitt MD;  Location: [facility];  Service: General;  Laterality: Left;     Review of Systems   Constitutional:  Negative for appetite change and fatigue.   HENT:  Negative for mouth sores, rhinorrhea and sore throat.    Eyes: Negative.    Respiratory: Negative.     Cardiovascular: Negative.    Gastrointestinal:  Negative for constipation, diarrhea, nausea and vomiting.   Endocrine: Negative.    Genitourinary: Negative.  Negative for hot flashes.   Musculoskeletal: Negative.    Integumentary:  Negative.   Allergic/Immunologic: Negative.    Neurological:  Negative for weakness and numbness.   Hematological: Negative.    Psychiatric/Behavioral:  Negative.         Medication List with Changes/Refills   Current Medications    ANASTROZOLE (ARIMIDEX) 1 MG TAB    Take 1 tablet (1 mg total) by mouth once daily.    ASPIRIN (ECOTRIN) 81 MG EC TABLET    Take 81 mg by mouth once daily.    CALCIUM CARBONATE (OS-LÓPEZ) 500 MG CALCIUM (1,250 MG) TABLET    Take 1 tablet (500 mg total) by mouth once daily.    DONEPEZIL (ARICEPT) 10 MG TABLET    Take 1 tablet (10 mg total) by mouth every evening.    ONDANSETRON (ZOFRAN-ODT) 8 MG TBDL    Take 1 tablet (8 mg total) by mouth every 8 (eight) hours as needed (Nausea).    PRAVASTATIN (PRAVACHOL) 40 MG TABLET    TAKE ONE TABLET BY MOUTH ONE TIME DAILY    VITAMIN D (VITAMIN D3) 1000 UNITS TAB    Take 1,000 Units by mouth once daily.     Objective:     Vitals:    10/09/23 1418   BP: (!) 141/78   Pulse: 70   Temp: 97.4 °F (36.3 °C)     Physical Exam  Vitals reviewed.   Constitutional:       Appearance: Normal appearance.   HENT:      Head: Normocephalic.      Mouth/Throat:      Comments:     Eyes:      Extraocular Movements: Extraocular movements intact.      Pupils: Pupils are equal, round, and reactive to light.   Cardiovascular:      Rate and Rhythm: Normal rate and regular rhythm.      Heart sounds: Normal heart sounds.   Pulmonary:      Effort: Pulmonary effort is normal.      Breath sounds: Normal breath sounds.   Abdominal:      General: Bowel sounds are normal.      Palpations: Abdomen is soft.      Comments: rounded     Genitourinary:     Comments: deferred    Musculoskeletal:         General: Normal range of motion.      Cervical back: Normal range of motion and neck supple.   Skin:     General: Skin is warm and dry.   Neurological:      Mental Status: She is alert and oriented to person, place, and time.      Comments: Repeats herself and asks the same questions over and over as is typical of dementia   Psychiatric:         Behavior: Behavior normal.         Thought Content: Thought content normal.          (2) Ambulatory  and capable of self care, unable to carry out work activity, up and about > 50% or waking hours  Assessment:     Problem List Items Addressed This Visit          Oncology    Malignant neoplasm of central portion of left breast in female, estrogen receptor positive - Primary     Diagnosed on screening MMG. S/p mastectomy in 11/2022 followed by XRT. lauriy on Arimidex daily.             Orthopedic    Osteopenia of hip     Noted on DEXA in 9/2022. Currently on Prolia every 6 months.           Plan:     Malignant neoplasm of central portion of left breast in female, estrogen receptor positive    Osteopenia of hip, unspecified laterality    Labs reviewed.   Continue Arimidex with calcium + vitamin D daily.   Ok to proceed with C2D1 of Prolia today; Ca ++ 9.3.  Follow up in  6 months  with CBC and Comprehensive Metabolic Panel prior to C3D1.  Repeat DEXA in 9/2024.   Surveillance MMG due 10/2023; scheduled for 12/7/2023 prior to visit with Dr. Leavitt.     Route Chart for Scheduling    Med Onc Chart Routing      Follow up with physician    Follow up with ARNIE 6 months.   Infusion scheduling note    Injection scheduling note in 6 months for Prolia   Labs CBC and CMP   Scheduling:  Preferred lab:  Lab interval:  in 6 months   Imaging None      Pharmacy appointment No pharmacy appointment needed      Other referrals       No additional referrals needed             I will review assessment/plan with collaborating physician.      ORTEGA Potts

## 2023-10-09 NOTE — ASSESSMENT & PLAN NOTE
Diagnosed on screening MMG. S/p mastectomy in 11/2022 followed by XRT. bridgette on Arimidex daily.

## 2023-10-10 ENCOUNTER — PATIENT MESSAGE (OUTPATIENT)
Dept: SURGERY | Facility: CLINIC | Age: 83
End: 2023-10-10
Payer: MEDICARE

## 2023-10-31 ENCOUNTER — EXTERNAL CHRONIC CARE MANAGEMENT (OUTPATIENT)
Dept: PRIMARY CARE CLINIC | Facility: CLINIC | Age: 83
End: 2023-10-31
Payer: MEDICARE

## 2023-10-31 PROCEDURE — 99439 PR CHRONIC CARE MGMT, EA ADDTL 20 MIN: ICD-10-PCS | Mod: S$PBB,,, | Performed by: FAMILY MEDICINE

## 2023-10-31 PROCEDURE — 99439 CHRNC CARE MGMT STAF EA ADDL: CPT | Mod: S$PBB,,, | Performed by: FAMILY MEDICINE

## 2023-10-31 PROCEDURE — 99439 CHRNC CARE MGMT STAF EA ADDL: CPT | Mod: PBBFAC,27,PO | Performed by: FAMILY MEDICINE

## 2023-10-31 PROCEDURE — 99490 CHRNC CARE MGMT STAFF 1ST 20: CPT | Mod: S$PBB,,, | Performed by: FAMILY MEDICINE

## 2023-10-31 PROCEDURE — 99490 CHRNC CARE MGMT STAFF 1ST 20: CPT | Mod: PBBFAC,PO | Performed by: FAMILY MEDICINE

## 2023-10-31 PROCEDURE — 99490 PR CHRONIC CARE MGMT, 1ST 20 MIN: ICD-10-PCS | Mod: S$PBB,,, | Performed by: FAMILY MEDICINE

## 2023-11-27 ENCOUNTER — TELEPHONE (OUTPATIENT)
Dept: SURGERY | Facility: CLINIC | Age: 83
End: 2023-11-27
Payer: MEDICARE

## 2023-11-27 NOTE — TELEPHONE ENCOUNTER
Left vm informing patient, and her  of changed appt with breast clinic. Informed that 12/18 appt has been changed to 12/26 with Dr Leavitt at the [facility] for 11:15. If not able to make this appointment, please call the office to reschedule.

## 2023-11-27 NOTE — TELEPHONE ENCOUNTER
----- Message from Sabrina Leavitt MD sent at 11/26/2023  8:40 PM CST -----  Same for ms galindo, she needs to be back on my schedule rather than on noé's

## 2023-11-30 ENCOUNTER — EXTERNAL CHRONIC CARE MANAGEMENT (OUTPATIENT)
Dept: PRIMARY CARE CLINIC | Facility: CLINIC | Age: 83
End: 2023-11-30
Payer: MEDICARE

## 2023-11-30 PROCEDURE — 99490 CHRNC CARE MGMT STAFF 1ST 20: CPT | Mod: PBBFAC,PO | Performed by: FAMILY MEDICINE

## 2023-11-30 PROCEDURE — 99490 CHRNC CARE MGMT STAFF 1ST 20: CPT | Mod: S$PBB,,, | Performed by: FAMILY MEDICINE

## 2023-11-30 PROCEDURE — 99490 PR CHRONIC CARE MGMT, 1ST 20 MIN: ICD-10-PCS | Mod: S$PBB,,, | Performed by: FAMILY MEDICINE

## 2023-12-07 ENCOUNTER — HOSPITAL ENCOUNTER (OUTPATIENT)
Dept: RADIOLOGY | Facility: HOSPITAL | Age: 83
Discharge: HOME | End: 2023-12-07
Attending: SURGERY
Payer: MEDICARE

## 2023-12-07 DIAGNOSIS — C50.112 MALIGNANT NEOPLASM OF CENTRAL PORTION OF LEFT BREAST IN FEMALE, ESTROGEN RECEPTOR POSITIVE: ICD-10-CM

## 2023-12-07 DIAGNOSIS — Z12.31 ENCOUNTER FOR SCREENING MAMMOGRAM FOR MALIGNANT NEOPLASM OF BREAST: ICD-10-CM

## 2023-12-07 DIAGNOSIS — Z17.0 MALIGNANT NEOPLASM OF CENTRAL PORTION OF LEFT BREAST IN FEMALE, ESTROGEN RECEPTOR POSITIVE: ICD-10-CM

## 2023-12-18 ENCOUNTER — HOSPITAL ENCOUNTER (OUTPATIENT)
Dept: RADIOLOGY | Facility: HOSPITAL | Age: 83
Discharge: HOME | End: 2023-12-18
Attending: SURGERY
Payer: MEDICARE

## 2023-12-18 VITALS — BODY MASS INDEX: 25.76 KG/M2 | HEIGHT: 62 IN | WEIGHT: 140 LBS

## 2023-12-18 DIAGNOSIS — C50.112 MALIGNANT NEOPLASM OF CENTRAL PORTION OF LEFT BREAST IN FEMALE, ESTROGEN RECEPTOR POSITIVE: ICD-10-CM

## 2023-12-18 DIAGNOSIS — Z12.31 ENCOUNTER FOR SCREENING MAMMOGRAM FOR MALIGNANT NEOPLASM OF BREAST: ICD-10-CM

## 2023-12-18 DIAGNOSIS — Z17.0 MALIGNANT NEOPLASM OF CENTRAL PORTION OF LEFT BREAST IN FEMALE, ESTROGEN RECEPTOR POSITIVE: ICD-10-CM

## 2023-12-18 PROCEDURE — 77065 DX MAMMO INCL CAD UNI: CPT | Mod: TC,RT

## 2023-12-18 PROCEDURE — 77065 MAMMO DIGITAL DIAGNOSTIC RIGHT WITH TOMO: ICD-10-PCS | Mod: 26,RT,, | Performed by: STUDENT IN AN ORGANIZED HEALTH CARE EDUCATION/TRAINING PROGRAM

## 2023-12-18 PROCEDURE — 77061 MAMMO DIGITAL DIAGNOSTIC RIGHT WITH TOMO: ICD-10-PCS | Mod: 26,RT,, | Performed by: STUDENT IN AN ORGANIZED HEALTH CARE EDUCATION/TRAINING PROGRAM

## 2023-12-18 PROCEDURE — 77061 BREAST TOMOSYNTHESIS UNI: CPT | Mod: 26,RT,, | Performed by: STUDENT IN AN ORGANIZED HEALTH CARE EDUCATION/TRAINING PROGRAM

## 2023-12-18 PROCEDURE — 77065 DX MAMMO INCL CAD UNI: CPT | Mod: 26,RT,, | Performed by: STUDENT IN AN ORGANIZED HEALTH CARE EDUCATION/TRAINING PROGRAM

## 2023-12-26 ENCOUNTER — OFFICE VISIT (OUTPATIENT)
Dept: SURGERY | Facility: CLINIC | Age: 83
End: 2023-12-26
Attending: SURGERY
Payer: MEDICARE

## 2023-12-26 DIAGNOSIS — C50.112 MALIGNANT NEOPLASM OF CENTRAL PORTION OF LEFT BREAST IN FEMALE, ESTROGEN RECEPTOR POSITIVE: Primary | ICD-10-CM

## 2023-12-26 DIAGNOSIS — Z17.0 MALIGNANT NEOPLASM OF CENTRAL PORTION OF LEFT BREAST IN FEMALE, ESTROGEN RECEPTOR POSITIVE: Primary | ICD-10-CM

## 2023-12-26 PROCEDURE — 99213 PR OFFICE/OUTPT VISIT, EST, LEVL III, 20-29 MIN: ICD-10-PCS | Mod: S$PBB,,, | Performed by: SURGERY

## 2023-12-26 PROCEDURE — 99213 OFFICE O/P EST LOW 20 MIN: CPT | Mod: S$PBB,,, | Performed by: SURGERY

## 2023-12-26 NOTE — PROGRESS NOTES
Breast Surgical Oncology  [facility]    Date of Service: 12/26/2023    DIAGNOSIS:   83 y.o. female with a history of left breast cancer.    Date of Diagnosis: 10/4/22  Pathology:  Invasive mammary carcinoma with lobular features  Clinical Stage:  Multicentric stage IA (cT1C cN0 MX)  Pathologic Stage:  Stage II A (pT3 pN1a MX)    TREATMENT:   Surgery: left total mastectomy with sentinel node biopsy on 11/15/22. Sabrina Leavitt M.D. Breast Surgical Oncology  Systemic Therapy: Endocrine therapy Arimidex from 1/11/23 to ongoing  Medical Oncologist: Zain Milton M.D.     Radiation Treatment Summary: PMRT simulation scheduled for 1/11/23  Radiation Oncologist: Rosa Carbajal M.D.     HISTORY OF PRESENT ILLNESS:   Vanessa Vazquez is here for oncological follow up.  Today, she denies new breast concerns such as pain, masses, skin changes, nipple discharge, nipple retraction or lumps under the arm.  She also denies bone pain, shortness of breath, abdominal pain and neurologic symptoms.     IMAGING:   No new     MEDICATIONS/ALLERGIES:     Current Outpatient Medications:     anastrozole (ARIMIDEX) 1 mg Tab, Take 1 tablet (1 mg total) by mouth once daily., Disp: 90 tablet, Rfl: 3    aspirin (ECOTRIN) 81 MG EC tablet, Take 81 mg by mouth once daily., Disp: , Rfl:     calcium carbonate (OS-LÓPEZ) 500 mg calcium (1,250 mg) tablet, Take 1 tablet (500 mg total) by mouth once daily., Disp: 90 tablet, Rfl: 3    donepeziL (ARICEPT) 10 MG tablet, Take 1 tablet (10 mg total) by mouth every evening., Disp: 30 tablet, Rfl: 11    ondansetron (ZOFRAN-ODT) 8 MG TbDL, Take 1 tablet (8 mg total) by mouth every 8 (eight) hours as needed (Nausea)., Disp: 10 tablet, Rfl: 0    pravastatin (PRAVACHOL) 40 MG tablet, TAKE ONE TABLET BY MOUTH ONE TIME DAILY, Disp: 90 tablet, Rfl: 3    vitamin D (VITAMIN D3) 1000 units Tab, Take 1,000 Units by mouth once daily., Disp: , Rfl:   Review of patient's allergies indicates:  No Known Allergies    PHYSICAL  EXAM:   General: The patient appears well and is in no acute distress.     BREAST EXAM  No Asymmetry  Right:  - Mass: No  - Skin change: No  - Nipple Discharge: No  - Nipple retraction: No  - Axillary LAD: No  Left: mastectomy   - Mass: No  - Skin change: post XRT changes  - Axillary LAD: No  Full range of motion left upper extremity    ASSESSMENT:   Diagnoses and all orders for this visit:    Malignant neoplasm of central portion of left breast in female, estrogen receptor positive       PLAN:   Vanessa has a benign exam today without evidence of local or distant relapse.    She will return in June for her 1.5 year follow up visit, or sooner should she develop breast concerns.    Right breast screening mammograms will be due in December    The patient is in agreement with the plan. Questions were encouraged and answered to patient's satisfaction. Vanessa will call our office with any questions or concerns.     I spent a total of 25 minutes on this visit. This includes face to face time and non-face to face time preparing to see the patient (eg, review of tests), obtaining and/or reviewing separately obtained history, documenting clinical information in the electronic or other health record, independently interpreting results and communicating results to the patient/family/caregiver, or care coordinator.       Sabrina Leavitt M.D. were

## 2023-12-31 ENCOUNTER — EXTERNAL CHRONIC CARE MANAGEMENT (OUTPATIENT)
Dept: PRIMARY CARE CLINIC | Facility: CLINIC | Age: 83
End: 2023-12-31
Payer: MEDICARE

## 2023-12-31 PROCEDURE — 99490 CHRNC CARE MGMT STAFF 1ST 20: CPT | Mod: S$PBB,,, | Performed by: FAMILY MEDICINE

## 2023-12-31 PROCEDURE — 99490 CHRNC CARE MGMT STAFF 1ST 20: CPT | Mod: PBBFAC,PO | Performed by: FAMILY MEDICINE

## 2024-01-31 ENCOUNTER — EXTERNAL CHRONIC CARE MANAGEMENT (OUTPATIENT)
Dept: PRIMARY CARE CLINIC | Facility: CLINIC | Age: 84
End: 2024-01-31
Payer: MEDICARE

## 2024-01-31 PROCEDURE — 99490 CHRNC CARE MGMT STAFF 1ST 20: CPT | Mod: S$PBB,,, | Performed by: FAMILY MEDICINE

## 2024-01-31 PROCEDURE — 99490 CHRNC CARE MGMT STAFF 1ST 20: CPT | Mod: PBBFAC,PO | Performed by: FAMILY MEDICINE

## 2024-02-29 ENCOUNTER — EXTERNAL CHRONIC CARE MANAGEMENT (OUTPATIENT)
Dept: PRIMARY CARE CLINIC | Facility: CLINIC | Age: 84
End: 2024-02-29
Payer: MEDICARE

## 2024-02-29 PROCEDURE — 99487 CPLX CHRNC CARE 1ST 60 MIN: CPT | Mod: PBBFAC,PO | Performed by: FAMILY MEDICINE

## 2024-02-29 PROCEDURE — 99487 CPLX CHRNC CARE 1ST 60 MIN: CPT | Mod: S$PBB,,, | Performed by: FAMILY MEDICINE

## 2024-02-29 PROCEDURE — 99489 CPLX CHRNC CARE EA ADDL 30: CPT | Mod: PBBFAC,25,27,PO | Performed by: FAMILY MEDICINE

## 2024-02-29 PROCEDURE — 99489 CPLX CHRNC CARE EA ADDL 30: CPT | Mod: S$PBB,,, | Performed by: FAMILY MEDICINE

## 2024-03-17 NOTE — PROGRESS NOTES
"SUBJECTIVE:     Vanessa Vazquez  MRN:  [id number]  83 y.o. female    CHIEF COMPLAINT:   Shoulder pain    HPI:    Vanessa Vazquez is here w/ her  (historian) with reports of left shoulder pain x 3 weeks.  Current pain 7/10.  No h/o fall, trauma or injury.  No pain at rest, more so with activity such as dressing, overhead lifting/raising.  He has had to assist her with any of her ADL's that would require arm lifting and overhead use.  Denies joint swelling, bruising, weakness or any NT to left arm/hand/fingers.  She is right-handed dominant.      Review of Systems   Constitutional: Negative.    Respiratory: Negative.     Cardiovascular: Negative.    Musculoskeletal:  Positive for joint pain (left shoulder w/ decreased ROM). Negative for back pain, falls and neck pain.   Skin:         Concerned with multiple facial skin lesions.  One raised "mole" she continues to pick and scratch to cause increased bleeding.   Neurological:  Negative for tingling, tremors and weakness.       Review of patient's allergies indicates:  No Known Allergies      Patient Active Problem List   Diagnosis    Dementia without behavioral disturbance    Hypertriglyceridemia    Osteopenia of hip    Hearing loss    Osteoarthritis of thumb    CKD (chronic kidney disease), stage III    Urinary incontinence    Malignant neoplasm of central portion of left breast in female, estrogen receptor positive    Decreased functional mobility    History of CVA (cerebrovascular accident)    Immunodeficiency due to chemotherapy    Osteoporosis due to aromatase inhibitor    Secondary and unspecified malignant neoplasm of axilla and upper limb lymph nodes       Current Outpatient Medications   Medication Instructions    anastrozole (ARIMIDEX) 1 mg, Oral, Daily    aspirin (ECOTRIN) 81 mg, Daily    calcium carbonate (OS-LÓPEZ) 500 mg, Oral, Daily    donepeziL (ARICEPT) 10 mg, Oral, Nightly    ondansetron (ZOFRAN-ODT) 8 mg, Oral, Every 8 hours PRN    pravastatin " "(PRAVACHOL) 40 MG tablet TAKE ONE TABLET BY MOUTH ONE TIME DAILY    vitamin D (VITAMIN D3) 1,000 Units, Oral, Daily         Past medical, surgical, family and social histories have been reviewed today.      OBJECTIVE:     Vitals:    03/18/24 1003   BP: 122/62   Pulse: 76   Temp: 98.7 °F (37.1 °C)   TempSrc: Oral   SpO2: 96%   Weight: 64.4 kg (141 lb 13.9 oz)   Height: 5' 2" (1.575 m)   PainSc:   7   PainLoc: Shoulder       Physical Exam  Musculoskeletal:      Left shoulder: No swelling, deformity, tenderness or crepitus. Normal range of motion. Normal strength. Normal pulse.   Skin:     Comments: Multiple moles noted to facial area, skin tags to neck and upper chest.  There are 2 concerning lesions noted to (1) right cheek and (2) right temporal area that appear rough and dark brown in color; shape irregular.  Fleshy colored raised nodule noted to the right lower face near ear/jaw.   Neurological:      Mental Status: She is alert. Mental status is at baseline.       ASSESSMENT:     1. Left shoulder pain, unspecified chronicity  -     X-Ray Shoulder 2 or More Views Left; Future; Expected date: 03/18/2024    2. Decreased range of motion of left shoulder  -     X-Ray Shoulder 2 or More Views Left; Future; Expected date: 03/18/2024    3. Skin lesions  -     Ambulatory referral/consult to Dermatology; Future; Expected date: 03/25/2024      PLAN:     To Derm for skin check.  XR today of left shoulder, pending.  Joint care discussed.  RTC as directed and/or prn.        GERARD Jane  [facility]       25 minutes of total time spent on the encounter, which includes face to face time and non-face to face time preparing to see the patient.  This includes obtaining and/or reviewing separately obtained history, performing a medically appropriate examination and/or evaluation, and counseling and educating the patient/family/caregiver.  Includes documenting clinical information in the electronic " or other health record, independently interpreting results (not separately reported) and communicating results to the patient/family/caregiver, with care coordination (not separately reported).  Medications, tests and/or procedures ordered as necessary along with referring and communicating with other health professionals (when not separately reported).

## 2024-03-18 ENCOUNTER — HOSPITAL ENCOUNTER (OUTPATIENT)
Dept: RADIOLOGY | Facility: HOSPITAL | Age: 84
Discharge: HOME | End: 2024-03-18
Attending: REGISTERED NURSE
Payer: MEDICARE

## 2024-03-18 ENCOUNTER — OFFICE VISIT (OUTPATIENT)
Dept: FAMILY MEDICINE | Facility: CLINIC | Age: 84
End: 2024-03-18
Payer: MEDICARE

## 2024-03-18 VITALS
SYSTOLIC BLOOD PRESSURE: 122 MMHG | HEART RATE: 76 BPM | DIASTOLIC BLOOD PRESSURE: 62 MMHG | HEIGHT: 62 IN | OXYGEN SATURATION: 96 % | WEIGHT: 141.88 LBS | BODY MASS INDEX: 26.11 KG/M2 | TEMPERATURE: 99 F

## 2024-03-18 DIAGNOSIS — M25.612 DECREASED RANGE OF MOTION OF LEFT SHOULDER: ICD-10-CM

## 2024-03-18 DIAGNOSIS — L98.9 SKIN LESIONS: ICD-10-CM

## 2024-03-18 DIAGNOSIS — M25.512 LEFT SHOULDER PAIN, UNSPECIFIED CHRONICITY: ICD-10-CM

## 2024-03-18 DIAGNOSIS — M25.512 LEFT SHOULDER PAIN, UNSPECIFIED CHRONICITY: Primary | ICD-10-CM

## 2024-03-18 PROCEDURE — 73030 X-RAY EXAM OF SHOULDER: CPT | Mod: 26,LT,, | Performed by: RADIOLOGY

## 2024-03-18 PROCEDURE — 73030 X-RAY EXAM OF SHOULDER: CPT | Mod: TC,FY,PO,LT

## 2024-03-18 PROCEDURE — 99213 OFFICE O/P EST LOW 20 MIN: CPT | Mod: S$PBB,,, | Performed by: REGISTERED NURSE

## 2024-03-18 PROCEDURE — 99213 OFFICE O/P EST LOW 20 MIN: CPT | Mod: PBBFAC,25,PO | Performed by: REGISTERED NURSE

## 2024-03-18 PROCEDURE — 99999 PR PBB SHADOW E&M-EST. PATIENT-LVL III: CPT | Mod: PBBFAC,,, | Performed by: REGISTERED NURSE

## 2024-03-19 ENCOUNTER — OFFICE VISIT (OUTPATIENT)
Dept: DERMATOLOGY | Facility: CLINIC | Age: 84
End: 2024-03-19
Payer: MEDICARE

## 2024-03-19 DIAGNOSIS — L57.0 ACTINIC KERATOSES: ICD-10-CM

## 2024-03-19 DIAGNOSIS — L82.0 SEBORRHEIC KERATOSIS, INFLAMED: ICD-10-CM

## 2024-03-19 DIAGNOSIS — Z12.83 SCREENING, MALIGNANT NEOPLASM, SKIN: ICD-10-CM

## 2024-03-19 DIAGNOSIS — L82.1 SEBORRHEIC KERATOSIS: Primary | ICD-10-CM

## 2024-03-19 DIAGNOSIS — L98.9 SKIN LESIONS: ICD-10-CM

## 2024-03-19 PROCEDURE — 17000 DESTRUCT PREMALG LESION: CPT | Mod: S$PBB,,, | Performed by: DERMATOLOGY

## 2024-03-19 PROCEDURE — 17000 DESTRUCT PREMALG LESION: CPT | Mod: PBBFAC | Performed by: DERMATOLOGY

## 2024-03-19 PROCEDURE — 99203 OFFICE O/P NEW LOW 30 MIN: CPT | Mod: 25,S$PBB,, | Performed by: DERMATOLOGY

## 2024-03-19 PROCEDURE — 99213 OFFICE O/P EST LOW 20 MIN: CPT | Mod: PBBFAC | Performed by: DERMATOLOGY

## 2024-03-19 PROCEDURE — 99999 PR PBB SHADOW E&M-EST. PATIENT-LVL III: CPT | Mod: PBBFAC,,, | Performed by: DERMATOLOGY

## 2024-03-19 NOTE — PROGRESS NOTES
Subjective:      Patient ID:  Vanessa Vazquez is a 83 y.o. female who presents for   Chief Complaint   Patient presents with    Skin Check     FBSE.      History of Present Illness: The patient presents with chief complaint of lesions.  Location: face  Duration: several years  Signs/Symptoms: irritated, growing    Prior treatments: none    The patient denies personal or family history of skin cancer.            Review of Systems   Constitutional:  Negative for fever and chills.   Gastrointestinal:  Negative for nausea and vomiting.   Skin:  Positive for activity-related sunscreen use. Negative for daily sunscreen use and recent sunburn.   Hematologic/Lymphatic: Does not bruise/bleed easily.       Objective:   Physical Exam   Constitutional: She appears well-developed and well-nourished. No distress.   Neurological: She is alert and oriented to person, place, and time. She is not disoriented.   Psychiatric: She has a normal mood and affect.   Skin:   Areas Examined (abnormalities noted in diagram):   Scalp / Hair Palpated and Inspected  Head / Face Inspection Performed  Neck Inspection Performed  Chest / Axilla Inspection Performed  Abdomen Inspection Performed  Back Inspection Performed  RUE Inspected  LUE Inspection Performed  RLE Inspected  LLE Inspection Performed  Nails and Digits Inspection Performed                 Diagram Legend     Erythematous scaling macule/papule c/w actinic keratosis       Vascular papule c/w angioma      Pigmented verrucoid papule/plaque c/w seborrheic keratosis      Yellow umbilicated papule c/w sebaceous hyperplasia      Irregularly shaped tan macule c/w lentigo     1-2 mm smooth white papules consistent with Milia      Movable subcutaneous cyst with punctum c/w epidermal inclusion cyst      Subcutaneous movable cyst c/w pilar cyst      Firm pink to brown papule c/w dermatofibroma      Pedunculated fleshy papule(s) c/w skin tag(s)      Evenly pigmented macule c/w junctional nevus      Mildly variegated pigmented, slightly irregular-bordered macule c/w mildly atypical nevus      Flesh colored to evenly pigmented papule c/w intradermal nevus       Pink pearly papule/plaque c/w basal cell carcinoma      Erythematous hyperkeratotic cursted plaque c/w SCC      Surgical scar with no sign of skin cancer recurrence      Open and closed comedones      Inflammatory papules and pustules      Verrucoid papule consistent consistent with wart     Erythematous eczematous patches and plaques     Dystrophic onycholytic nail with subungual debris c/w onychomycosis     Umbilicated papule    Erythematous-base heme-crusted tan verrucoid plaque consistent with inflamed seborrheic keratosis     Erythematous Silvery Scaling Plaque c/w Psoriasis     See annotation      Assessment / Plan:        Seborrheic keratosis  Seborrheic keratosis, inflamed  Reassurance given. Discussed diagnosis and that lesions are benign.  AAD handout given.     Skin lesions  -     Ambulatory referral/consult to Dermatology  Screening, malignant neoplasm, skin  Total body skin examination performed today including at least 12 points as noted in physical examination. No lesions suspicious for malignancy noted.      Actinic keratoses    Cryosurgery Procedure Note    The patient is informed of the precancerous quality and need for treatment of these lesions. After risks, benefits and alternatives explained, including blistering, pain, hyper- and hypopigmentation, patient verbally consents to cryotherapy to precancerous lesions. Liquid nitrogen cryosurgery is applied to the 1 actinic keratoses, as detailed in the physical exam, to produce a freeze injury. The patient is aware that blisters may form and is instructed on wound care with gentle cleansing and use of vaseline ointment to keep moist until healed. The patient is supplied a handout on cryosurgery and is instructed to call if lesions do not completely resolve.             Follow up if  symptoms worsen or fail to improve.

## 2024-03-19 NOTE — PATIENT INSTRUCTIONS

## 2024-03-26 ENCOUNTER — OFFICE VISIT (OUTPATIENT)
Dept: SPORTS MEDICINE | Facility: CLINIC | Age: 84
End: 2024-03-26
Payer: MEDICARE

## 2024-03-26 VITALS — RESPIRATION RATE: 17 BRPM | WEIGHT: 142 LBS | HEIGHT: 62 IN | BODY MASS INDEX: 26.13 KG/M2

## 2024-03-26 DIAGNOSIS — M25.612 DECREASED RANGE OF MOTION OF LEFT SHOULDER: ICD-10-CM

## 2024-03-26 DIAGNOSIS — M75.02 ADHESIVE CAPSULITIS OF LEFT SHOULDER: Primary | ICD-10-CM

## 2024-03-26 DIAGNOSIS — M25.512 LEFT SHOULDER PAIN, UNSPECIFIED CHRONICITY: ICD-10-CM

## 2024-03-26 PROCEDURE — 99203 OFFICE O/P NEW LOW 30 MIN: CPT | Mod: S$PBB,25,, | Performed by: STUDENT IN AN ORGANIZED HEALTH CARE EDUCATION/TRAINING PROGRAM

## 2024-03-26 PROCEDURE — 99999PBSHW PR PBB SHADOW TECHNICAL ONLY FILED TO HB: Mod: PBBFAC,,,

## 2024-03-26 PROCEDURE — 20610 DRAIN/INJ JOINT/BURSA W/O US: CPT | Mod: PBBFAC | Performed by: STUDENT IN AN ORGANIZED HEALTH CARE EDUCATION/TRAINING PROGRAM

## 2024-03-26 PROCEDURE — 99213 OFFICE O/P EST LOW 20 MIN: CPT | Mod: PBBFAC,25 | Performed by: STUDENT IN AN ORGANIZED HEALTH CARE EDUCATION/TRAINING PROGRAM

## 2024-03-26 PROCEDURE — 99999 PR PBB SHADOW E&M-EST. PATIENT-LVL III: CPT | Mod: PBBFAC,,, | Performed by: STUDENT IN AN ORGANIZED HEALTH CARE EDUCATION/TRAINING PROGRAM

## 2024-03-26 RX ORDER — TRIAMCINOLONE ACETONIDE 40 MG/ML
40 INJECTION, SUSPENSION INTRA-ARTICULAR; INTRAMUSCULAR
Status: DISCONTINUED | OUTPATIENT
Start: 2024-03-26 | End: 2024-03-26 | Stop reason: HOSPADM

## 2024-03-26 RX ADMIN — TRIAMCINOLONE ACETONIDE 40 MG: 200 INJECTION, SUSPENSION INTRA-ARTICULAR; INTRAMUSCULAR at 10:03

## 2024-03-26 NOTE — PROCEDURES
Large Joint Aspiration/Injection: L glenohumeral    Date/Time: 3/26/2024 10:30 AM    Performed by: Dinh Can MD  Authorized by: Dinh Can MD    Consent Done?:  Yes (Verbal)  Indications:  Pain  Site marked: the procedure site was marked    Timeout: prior to procedure the correct patient, procedure, and site was verified    Prep: patient was prepped and draped in usual sterile fashion      Local anesthesia used?: Yes    Anesthesia:  Local infiltration  Local anesthetic:  Lidocaine 1% without epinephrine    Details:  Needle Size:  22 G  Ultrasonic Guidance for needle placement?: No    Approach:  Posterior  Location:  Shoulder  Site:  L glenohumeral  Medications:  40 mg triamcinolone acetonide 40 mg/mL  Patient tolerance:  Patient tolerated the procedure well with no immediate complications

## 2024-03-26 NOTE — PROGRESS NOTES
Orthopaedics Sports Medicine     Shoulder Initial Visit         3/26/2024    Referring MD: Brady Matson, NP    Chief Complaint   Patient presents with    Left Shoulder - Pain         History of Present Illness:   Vanessa Vazquez is a 83 y.o. right-hand dominant female who presents with left shoulder pain and dysfunction. Of note, patient has dementia and has had a mastectomy in the last year.  She is getting biannual treatment.     Onset of the symptoms was a few weeks ago.      Inciting event: reports from  that she most likely did not have a specific injury but is not completely sure. Her  suspects may have started after mastectomy     Current symptoms include pain and soreness, limited ROM      Pain is aggravated by overhead and reaching movements.      Evaluation to date: X-Ray,      Treatment to date: Rest, activity modification, PT      Past Medical History:   Past Medical History:   Diagnosis Date    Breast cancer     Left, radiation    CKD (chronic kidney disease), stage III     Dementia     Fibrocystic breast disease     Hearing loss     Hyperlipidemia     Osteoarthritis of thumb     Osteopenia     Stroke     Incidental finding in 2012.       Past Surgical History:   Past Surgical History:   Procedure Laterality Date    BREAST BIOPSY Left 05/2018    Benign    CATARACT EXTRACTION EXTRACAPSULAR W/ INTRAOCULAR LENS IMPLANTATION      SENTINEL LYMPH NODE BIOPSY Left 11/15/2022    Procedure: BIOPSY, LYMPH NODE, SENTINEL;  Surgeon: Sabrina Leavitt MD;  Location: [facility] OR;  Service: General;  Laterality: Left;    SIMPLE MASTECTOMY Left 11/15/2022    Procedure: MASTECTOMY, SIMPLE;  Surgeon: Sabrina Leavitt MD;  Location: [facility] OR;  Service: General;  Laterality: Left;       Medications:  Patient's Medications   New Prescriptions    No medications on file   Previous Medications    ANASTROZOLE (ARIMIDEX) 1 MG TAB    Take 1 tablet (1 mg total) by mouth once daily.    ASPIRIN (ECOTRIN) 81 MG EC  "TABLET    Take 81 mg by mouth once daily.    CALCIUM CARBONATE (OS-LÓPEZ) 500 MG CALCIUM (1,250 MG) TABLET    Take 1 tablet (500 mg total) by mouth once daily.    DONEPEZIL (ARICEPT) 10 MG TABLET    Take 1 tablet (10 mg total) by mouth every evening.    ONDANSETRON (ZOFRAN-ODT) 8 MG TBDL    Take 1 tablet (8 mg total) by mouth every 8 (eight) hours as needed (Nausea).    PRAVASTATIN (PRAVACHOL) 40 MG TABLET    TAKE ONE TABLET BY MOUTH ONE TIME DAILY    VITAMIN D (VITAMIN D3) 1000 UNITS TAB    Take 1,000 Units by mouth once daily.   Modified Medications    No medications on file   Discontinued Medications    No medications on file       Allergies: Review of patient's allergies indicates:  No Known Allergies    Social History:   Home town: [place]  Occupation: retired  Alcohol use: She reports no history of alcohol use.  Tobacco use: She reports that she has never smoked. She has never used smokeless tobacco.    Review of systems:  History of recent illness, fevers, shakes, or chills: no  History of cardiac problems or chest pain: htn  History of pulmonary problems or asthma: no  History of diabetes: no  History of prior dvt or clotting problems: no  History of sleep apnea: no      Physical Examination:  Estimated body mass index is 25.97 kg/m² as calculated from the following:    Height as of this encounter: 5' 2" (1.575 m).    Weight as of this encounter: 64.4 kg (141 lb 15.6 oz).    General  Healthy appearing female in no acute distress  Alert and oriented, normal mood, appropriate affect    Shoulder Examination:  Patient is alert and oriented, no distress. Skin is intact. Neuro is normal with no focal motor or sensory findings.    Cervical exam is unremarkable. Intact cervical ROM. Negative Spurling's test    Physical Exam:  RIGHT    LEFT    Scap Dyskinesis/Winging (-)    (-)    Tenderness:          Greater Tuberosity             (-)    (-)  Bicipital Groove  (-)    +  AC joint   (-)    (-)  Other: "     ROM:  Forward Elevation 180    90  Abduction  120    80  ER (at side)  80     40  IR   T8    Back pocket    Strength:   Supraspinatus  5/5    4/5  Infraspinatus  5/5    4/5  Subscap / IR  5/5    5/5     Special Tests:   Neer:    (-)    +   Phan:   (-)    +   SS Stress:   (-)    +   Bear Hug:   (-)    (-)   Rhea's:   (-)    +   Resisted Thrower's:   (-)    +   Cross Arm Abduction:  (-)    (-)    Neurovascular examination  - Motor grossly intact bilaterally to shoulder abduction, elbow flexion and extension, wrist flexion and extension, and intrinsic hand musculature  - Sensation intact to light touch bilaterally in axillary, median, radial, and ulnar distributions  - Symmetrical radial pulses      Imaging:  XR Results:  Results for orders placed during the hospital encounter of 03/18/24    X-Ray Shoulder 2 or More Views Left    Narrative  EXAM:    XR SHOULDER COMPLETE 2 OR MORE VIEWS LEFT    CLINICAL HISTORY: [M25.512]-Pain in left shoulder./[M25.612]-Stiffness of left shoulder, not elsewhere classified.    COMPARISON: None    FINDINGS:  This examination consists of 3 views of the left shoulder.  There is no fracture.  There is no dislocation. There is a 9 mm calcification projected superior to the left glenohumeral joint.  There are surgical clips in the left axilla.    Impression  1.  There is a 9 mm calcification projected superior to the left glenohumeral joint.  An intra-articular loose body cannot be excluded.  2.  There are surgical clips in the left axilla.    Finalized on: 3/18/2024 12:02 PM By:  Renan Concepcion MD  [facility]# [id number]      2024-03-18 12:04:[id number]    [facility]      MRI Results:  No results found for this or any previous visit.      CT Results:  No results found for this or any previous visit.      Physician Read: I agree with the above impression.      Impression:  83 y.o. female with left shoulder adhesive capsulitis, rotator cuff tendinopathy.       Plan:  Discussed diagnosis and treatment  options with patient today. Her exam today is consistent with left shoulder adhesive capsulitis and rotator cuff tendinopathy.   I recommend proceeding with intra-articular corticosteroid injection into the left glenohumeral joint. The patient is in agreement with this plan.   Procedure performed today and patient tolerated the procedure well with no immediate complications.   Follow up as needed.            Dinh Can MD    I, Jose Danielson, acted as a scribe for Dinh Can MD for the duration of this office visit.

## 2024-03-31 ENCOUNTER — EXTERNAL CHRONIC CARE MANAGEMENT (OUTPATIENT)
Dept: PRIMARY CARE CLINIC | Facility: CLINIC | Age: 84
End: 2024-03-31
Payer: MEDICARE

## 2024-03-31 PROCEDURE — 99439 CHRNC CARE MGMT STAF EA ADDL: CPT | Mod: S$PBB,,, | Performed by: FAMILY MEDICINE

## 2024-03-31 PROCEDURE — 99490 CHRNC CARE MGMT STAFF 1ST 20: CPT | Mod: S$PBB,,, | Performed by: FAMILY MEDICINE

## 2024-03-31 PROCEDURE — 99490 CHRNC CARE MGMT STAFF 1ST 20: CPT | Mod: PBBFAC,PO | Performed by: FAMILY MEDICINE

## 2024-03-31 PROCEDURE — 99439 CHRNC CARE MGMT STAF EA ADDL: CPT | Mod: PBBFAC,PO | Performed by: FAMILY MEDICINE

## 2024-04-08 DIAGNOSIS — Z17.0 MALIGNANT NEOPLASM OF CENTRAL PORTION OF LEFT BREAST IN FEMALE, ESTROGEN RECEPTOR POSITIVE: ICD-10-CM

## 2024-04-08 DIAGNOSIS — C50.112 MALIGNANT NEOPLASM OF CENTRAL PORTION OF LEFT BREAST IN FEMALE, ESTROGEN RECEPTOR POSITIVE: ICD-10-CM

## 2024-04-09 RX ORDER — ANASTROZOLE 1 MG/1
1 TABLET ORAL
Qty: 90 TABLET | Refills: 0 | Status: SHIPPED | OUTPATIENT
Start: 2024-04-09

## 2024-04-15 ENCOUNTER — LAB VISIT (OUTPATIENT)
Dept: LAB | Facility: HOSPITAL | Age: 84
End: 2024-04-15
Attending: NURSE PRACTITIONER
Payer: MEDICARE

## 2024-04-15 DIAGNOSIS — Z17.0 MALIGNANT NEOPLASM OF CENTRAL PORTION OF LEFT BREAST IN FEMALE, ESTROGEN RECEPTOR POSITIVE: ICD-10-CM

## 2024-04-15 DIAGNOSIS — C50.112 MALIGNANT NEOPLASM OF CENTRAL PORTION OF LEFT BREAST IN FEMALE, ESTROGEN RECEPTOR POSITIVE: ICD-10-CM

## 2024-04-15 DIAGNOSIS — M85.859 OSTEOPENIA OF HIP, UNSPECIFIED LATERALITY: ICD-10-CM

## 2024-04-15 LAB
ALBUMIN SERPL BCP-MCNC: 3.9 G/DL (ref 3.5–5.2)
ALP SERPL-CCNC: 59 U/L (ref 55–135)
ALT SERPL W/O P-5'-P-CCNC: 23 U/L (ref 10–44)
ANION GAP SERPL CALC-SCNC: 8 MMOL/L (ref 8–16)
AST SERPL-CCNC: 18 U/L (ref 10–40)
BASOPHILS # BLD AUTO: 0.08 K/UL (ref 0–0.2)
BASOPHILS NFR BLD: 1.1 % (ref 0–1.9)
BILIRUB SERPL-MCNC: 0.3 MG/DL (ref 0.1–1)
BUN SERPL-MCNC: 18 MG/DL (ref 8–23)
CALCIUM SERPL-MCNC: 9.7 MG/DL (ref 8.7–10.5)
CHLORIDE SERPL-SCNC: 108 MMOL/L (ref 95–110)
CO2 SERPL-SCNC: 24 MMOL/L (ref 23–29)
CREAT SERPL-MCNC: 1.2 MG/DL (ref 0.5–1.4)
DIFFERENTIAL METHOD BLD: NORMAL
EOSINOPHIL # BLD AUTO: 0.2 K/UL (ref 0–0.5)
EOSINOPHIL NFR BLD: 2.7 % (ref 0–8)
ERYTHROCYTE [DISTWIDTH] IN BLOOD BY AUTOMATED COUNT: 14 % (ref 11.5–14.5)
EST. GFR  (NO RACE VARIABLE): 45 ML/MIN/1.73 M^2
GLUCOSE SERPL-MCNC: 136 MG/DL (ref 70–110)
HCT VFR BLD AUTO: 38.9 % (ref 37–48.5)
HGB BLD-MCNC: 13 G/DL (ref 12–16)
IMM GRANULOCYTES # BLD AUTO: 0.03 K/UL (ref 0–0.04)
IMM GRANULOCYTES NFR BLD AUTO: 0.4 % (ref 0–0.5)
LYMPHOCYTES # BLD AUTO: 1.4 K/UL (ref 1–4.8)
LYMPHOCYTES NFR BLD: 20.1 % (ref 18–48)
MCH RBC QN AUTO: 30 PG (ref 27–31)
MCHC RBC AUTO-ENTMCNC: 33.4 G/DL (ref 32–36)
MCV RBC AUTO: 90 FL (ref 82–98)
MONOCYTES # BLD AUTO: 0.4 K/UL (ref 0.3–1)
MONOCYTES NFR BLD: 5.9 % (ref 4–15)
NEUTROPHILS # BLD AUTO: 5 K/UL (ref 1.8–7.7)
NEUTROPHILS NFR BLD: 69.8 % (ref 38–73)
NRBC BLD-RTO: 0 /100 WBC
PLATELET # BLD AUTO: 266 K/UL (ref 150–450)
PMV BLD AUTO: 9.4 FL (ref 9.2–12.9)
POTASSIUM SERPL-SCNC: 4.2 MMOL/L (ref 3.5–5.1)
PROT SERPL-MCNC: 7.7 G/DL (ref 6–8.4)
RBC # BLD AUTO: 4.34 M/UL (ref 4–5.4)
SODIUM SERPL-SCNC: 140 MMOL/L (ref 136–145)
WBC # BLD AUTO: 7.16 K/UL (ref 3.9–12.7)

## 2024-04-15 PROCEDURE — 36415 COLL VENOUS BLD VENIPUNCTURE: CPT | Performed by: NURSE PRACTITIONER

## 2024-04-15 PROCEDURE — 80053 COMPREHEN METABOLIC PANEL: CPT | Performed by: NURSE PRACTITIONER

## 2024-04-15 PROCEDURE — 85025 COMPLETE CBC W/AUTO DIFF WBC: CPT | Performed by: NURSE PRACTITIONER

## 2024-04-17 ENCOUNTER — OFFICE VISIT (OUTPATIENT)
Dept: HEMATOLOGY/ONCOLOGY | Facility: CLINIC | Age: 84
End: 2024-04-17
Payer: MEDICARE

## 2024-04-17 VITALS
HEART RATE: 64 BPM | HEIGHT: 62 IN | SYSTOLIC BLOOD PRESSURE: 145 MMHG | TEMPERATURE: 98 F | WEIGHT: 139.56 LBS | DIASTOLIC BLOOD PRESSURE: 77 MMHG | BODY MASS INDEX: 25.68 KG/M2 | OXYGEN SATURATION: 98 % | RESPIRATION RATE: 20 BRPM

## 2024-04-17 DIAGNOSIS — Z17.0 MALIGNANT NEOPLASM OF CENTRAL PORTION OF LEFT BREAST IN FEMALE, ESTROGEN RECEPTOR POSITIVE: Primary | ICD-10-CM

## 2024-04-17 DIAGNOSIS — C50.112 MALIGNANT NEOPLASM OF CENTRAL PORTION OF LEFT BREAST IN FEMALE, ESTROGEN RECEPTOR POSITIVE: Primary | ICD-10-CM

## 2024-04-17 DIAGNOSIS — M85.859 OSTEOPENIA OF HIP, UNSPECIFIED LATERALITY: ICD-10-CM

## 2024-04-17 DIAGNOSIS — M85.88 OTHER SPECIFIED DISORDERS OF BONE DENSITY AND STRUCTURE, OTHER SITE: ICD-10-CM

## 2024-04-17 PROCEDURE — 99215 OFFICE O/P EST HI 40 MIN: CPT | Mod: 25,S$PBB,, | Performed by: NURSE PRACTITIONER

## 2024-04-17 PROCEDURE — 99214 OFFICE O/P EST MOD 30 MIN: CPT | Mod: PBBFAC | Performed by: NURSE PRACTITIONER

## 2024-04-17 PROCEDURE — 99999 PR PBB SHADOW E&M-EST. PATIENT-LVL IV: CPT | Mod: PBBFAC,,, | Performed by: NURSE PRACTITIONER

## 2024-04-17 NOTE — PROGRESS NOTES
Subjective:       Patient ID: Vanessa Vazquez is a 83 y.o. female.    Chief Complaint:   1. Malignant neoplasm of central portion of left breast in female, estrogen receptor positive  Stage IB (pT3, pN1(sn), cM0, G1, ER+, AK+, HER2-, Oncotype DX score: 13)      2. Osteopenia of hip, unspecified laterality          Current Treatment:  OP ANASTROZOLE DAILY started in 1/2023    DENOSUMAB (PROLIA) Q6M     Treatment History:  S/p mastectomy on 11/15/2022 by Dr. Leavitt        S/p XRT     HPI: This is an 83 year old  woman with osteopenia, CVA, hearing loss, stage 3 CKD, fibrocystic breast disease, hyperlipidemia, dementia, and osteoarthritis of her thumb who is seen in Hem/Onc for Stage IB breast cancer. Screening MMG in 9/2022 revealed an asymmetry/mass with architectural distortion persists in the upper inner breast. US demonstrated an irregular hypoechoic mass measuring 1.7 x 1.2 cm at the 10 o'clock position. Biopsy proved IDC, grade 1, ER >95%, AK >95%, Her2 negative. She underwent mastectomy on 11/15/2022 by Dr. Leavitt; pathology showed IDC, grade 1, 5cm, negative for LVI. 1/2 nodes positive for carcinoma, 2mm. Oncotype recurrence score is 13.     She was started on Arimidex 1mg po daily in 1/2023 to complete 10 years of therapy. DEXA in 9/2022 showed osteopenia, and she was started on Prolia every 6 months.     Her primary Hematologist/Oncologist is Dr. Milton.    Interval History: Patient presents for follow up on Arimidex and Prolia; She is scheduled to receive C3D1 of Prolia today. She presents with her  and has no complaints today. She reports adherence to Arimidex and calcium + vitamin D and denies hot flashes and joint pain. CBC unremarkable. CMP stable; Ca 9.7, adequate for Prolia. Will proceed with Prolia next week. Surveillance MMG due 12/2024.    Reviewed labs with patient:   CBC:   Recent Labs   Lab 04/15/24  1258   WBC 7.16   RBC 4.34   Hemoglobin 13.0   Hematocrit 38.9   Platelets 266    MCV 90   MCH 30.0   MCHC 33.4     CMP:  Recent Labs   Lab 04/15/24  1258   Glucose 136 H   Calcium 9.7   Albumin 3.9   Total Protein 7.7   Sodium 140   Potassium 4.2   CO2 24   Chloride 108   BUN 18   Creatinine 1.2   Alkaline Phosphatase 59   ALT 23   AST 18   Total Bilirubin 0.3       Social History     Socioeconomic History    Marital status:    Tobacco Use    Smoking status: Never    Smokeless tobacco: Never   Substance and Sexual Activity    Alcohol use: No    Drug use: No    Sexual activity: Not Currently   Social History Narrative    The patient is  and the mother of 2 adult children. She retired from [employer] as .  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  She drives occasionally.     Social Determinants of Health     Financial Resource Strain: Low Risk  (5/15/2023)    Overall Financial Resource Strain (CARDIA)     Difficulty of Paying Living Expenses: Not hard at all   Food Insecurity: No Food Insecurity (5/15/2023)    Hunger Vital Sign     Worried About Running Out of Food in the Last Year: Never true     Ran Out of Food in the Last Year: Never true   Transportation Needs: Unknown (5/15/2023)    PRAPARE - Transportation     Lack of Transportation (Medical): No   Physical Activity: Sufficiently Active (5/15/2023)    Exercise Vital Sign     Days of Exercise per Week: 7 days     Minutes of Exercise per Session: 40 min   Stress: No Stress Concern Present (5/15/2023)    [employer] [place] [employer] - Occupational Stress Questionnaire     Feeling of Stress : Not at all   Social Connections: Socially Integrated (5/15/2023)    Social Connection and Isolation Panel [NHANES]     Frequency of Communication with Friends and Family: More than three times a week     Frequency of Social Gatherings with Friends and Family: More than three times a week     Attends Hoahaoism Services: More than 4 times per year      Active Member of Clubs or Organizations: No     Attends Club or Organization Meetings: More than 4 times per year     Marital Status:    Housing Stability: Unknown (5/15/2023)    Housing Stability Vital Sign     Unable to Pay for Housing in the Last Year: No     Unstable Housing in the Last Year: No     Past Medical History:   Diagnosis Date    Breast cancer     Left, radiation    CKD (chronic kidney disease), stage III     Dementia     Fibrocystic breast disease     Hearing loss     Hyperlipidemia     Osteoarthritis of thumb     Osteopenia     Stroke     Incidental finding in 2012.     Family History   Problem Relation Name Age of Onset    Glaucoma Brother      Hyperlipidemia Mother      Heart disease Father      Osteoporosis Cousin      Coronary artery disease Maternal Uncle       Past Surgical History:   Procedure Laterality Date    BREAST BIOPSY Left 05/2018    Benign    CATARACT EXTRACTION EXTRACAPSULAR W/ INTRAOCULAR LENS IMPLANTATION      SENTINEL LYMPH NODE BIOPSY Left 11/15/2022    Procedure: BIOPSY, LYMPH NODE, SENTINEL;  Surgeon: Sabrina Leavitt MD;  Location: [facility];  Service: General;  Laterality: Left;    SIMPLE MASTECTOMY Left 11/15/2022    Procedure: MASTECTOMY, SIMPLE;  Surgeon: Sabrina Leavitt MD;  Location: [facility];  Service: General;  Laterality: Left;     Review of Systems   Constitutional:  Negative for appetite change and fatigue.   HENT:  Negative for mouth sores, rhinorrhea and sore throat.    Eyes: Negative.    Respiratory: Negative.     Cardiovascular: Negative.    Gastrointestinal:  Negative for constipation, diarrhea, nausea and vomiting.   Endocrine: Negative.    Genitourinary: Negative.  Negative for hot flashes.   Musculoskeletal: Negative.    Integumentary:  Negative.   Allergic/Immunologic: Negative.    Neurological:  Negative for weakness and numbness.   Hematological: Negative.    Psychiatric/Behavioral: Negative.         Medication List with Changes/Refills    Current Medications    ANASTROZOLE (ARIMIDEX) 1 MG TAB    TAKE 1 TABLET BY MOUTH ONCE DAILY    ASPIRIN (ECOTRIN) 81 MG EC TABLET    Take 81 mg by mouth once daily.    CALCIUM CARBONATE (OS-LÓPEZ) 500 MG CALCIUM (1,250 MG) TABLET    Take 1 tablet (500 mg total) by mouth once daily.    DONEPEZIL (ARICEPT) 10 MG TABLET    Take 1 tablet (10 mg total) by mouth every evening.    ONDANSETRON (ZOFRAN-ODT) 8 MG TBDL    Take 1 tablet (8 mg total) by mouth every 8 (eight) hours as needed (Nausea).    PRAVASTATIN (PRAVACHOL) 40 MG TABLET    TAKE ONE TABLET BY MOUTH ONE TIME DAILY    VITAMIN D (VITAMIN D3) 1000 UNITS TAB    Take 1,000 Units by mouth once daily.     Objective:     Vitals:    04/17/24 0819   BP: (!) 145/77   Pulse: 64   Resp: 20   Temp: 97.5 °F (36.4 °C)     Physical Exam  Vitals reviewed.   Constitutional:       Appearance: Normal appearance.   HENT:      Head: Normocephalic.      Mouth/Throat:      Comments:     Eyes:      Extraocular Movements: Extraocular movements intact.      Pupils: Pupils are equal, round, and reactive to light.   Cardiovascular:      Rate and Rhythm: Normal rate and regular rhythm.      Heart sounds: Normal heart sounds.   Pulmonary:      Effort: Pulmonary effort is normal.      Breath sounds: Normal breath sounds.   Abdominal:      General: Bowel sounds are normal.      Palpations: Abdomen is soft.      Comments: rounded     Genitourinary:     Comments: deferred    Musculoskeletal:         General: Normal range of motion.      Cervical back: Normal range of motion and neck supple.   Skin:     General: Skin is warm and dry.   Neurological:      Mental Status: She is alert and oriented to person, place, and time.      Comments: Repeats herself and asks the same questions over and over as is typical of dementia   Psychiatric:         Behavior: Behavior normal.         Thought Content: Thought content normal.          (2) Ambulatory and capable of self care, unable to carry out work  activity, up and about > 50% or waking hours  Assessment:     Problem List Items Addressed This Visit          Oncology    Malignant neoplasm of central portion of left breast in female, estrogen receptor positive - Primary     Diagnosed on screening MMG. S/p mastectomy in 11/2022 followed by XRT. bridgette on Arimidex daily.             Orthopedic    Osteopenia of hip     Noted on DEXA in 9/2022. Currently on Prolia every 6 months.             Plan:     Malignant neoplasm of central portion of left breast in female, estrogen receptor positive    Osteopenia of hip, unspecified laterality    Labs reviewed.   Continue Arimidex with calcium + vitamin D daily.   Ok to proceed with C3D1 of Prolia next week; Ca ++ 9.7.  Follow up in  6 months with CBC and Comprehensive Metabolic Panel prior to C4D1.  Repeat DEXA in 9/2024; ordered today.   Surveillance MMG due 12/2024.     Route Chart for Scheduling    Med Onc Chart Routing      Follow up with physician    Follow up with ARNIE 6 months.   Infusion scheduling note    Injection scheduling note Prolia after next Wednesday (afternoon) and again in 6 months   Labs CBC and CMP   Scheduling:  Preferred lab:  Lab interval:  in 6 months   Imaging DXA scan   in 5 months   Pharmacy appointment No pharmacy appointment needed      Other referrals       No additional referrals needed             I will review assessment/plan with collaborating physician.      ORTEGA Potts

## 2024-04-26 ENCOUNTER — INFUSION (OUTPATIENT)
Dept: INFUSION THERAPY | Facility: HOSPITAL | Age: 84
End: 2024-04-26
Attending: INTERNAL MEDICINE
Payer: MEDICARE

## 2024-04-26 VITALS
OXYGEN SATURATION: 99 % | HEIGHT: 62 IN | BODY MASS INDEX: 25.68 KG/M2 | TEMPERATURE: 98 F | DIASTOLIC BLOOD PRESSURE: 65 MMHG | WEIGHT: 139.56 LBS | SYSTOLIC BLOOD PRESSURE: 136 MMHG | HEART RATE: 66 BPM | RESPIRATION RATE: 16 BRPM

## 2024-04-26 DIAGNOSIS — M81.8 OSTEOPOROSIS DUE TO AROMATASE INHIBITOR: Primary | ICD-10-CM

## 2024-04-26 DIAGNOSIS — T38.6X5A OSTEOPOROSIS DUE TO AROMATASE INHIBITOR: Primary | ICD-10-CM

## 2024-04-26 PROCEDURE — 96372 THER/PROPH/DIAG INJ SC/IM: CPT

## 2024-04-26 PROCEDURE — 63600175 PHARM REV CODE 636 W HCPCS: Mod: JZ,JG | Performed by: NURSE PRACTITIONER

## 2024-04-26 RX ADMIN — DENOSUMAB 60 MG: 60 INJECTION SUBCUTANEOUS at 01:04

## 2024-04-26 NOTE — DISCHARGE INSTRUCTIONS
Thank you for allowing me to care for you today,  ABI SaucedoN, RN    [facility]  [address] [address]  [phone] phone     [phone] fax  Hours of Operation: Monday- Friday 7:00am- 5:30pm  After hours phone  [phone]  Hematology / Oncology Physicians on call      RAUL Angela Dr., Dr., Dr., Dr., Dr., Dr., Dr., Dr., Dr., Dr., Dr., Dr., Dr., Dr., Dr., MOUNIKA Claros, MOUNIKA Jimenez, MOUNIKA Sesay, NP  Please call with any concerns regarding your appointment today.   FALL PREVENTION   Falls often occur due to slipping, tripping or losing your balance. Here are ways to reduce your risk of falling again.   Was there anything that caused your fall that can be fixed, removed or replaced?   Make your home safe by keeping walkways clear of objects you may trip over.   Use non-slip pads under rugs.   Do not walk in poorly lit areas.   Do not stand on chairs or wobbly ladders.   Use caution when reaching overhead or looking upward. This position can cause a loss of balance.   Be sure your shoes fit properly, have non-slip bottoms and are in good condition.   Be cautious when going up and down stairs, curbs, and when walking on uneven sidewalks.   If your balance is poor, consider using a cane or walker.   If your fall was related to alcohol use, stop or limit alcohol intake.   If your fall was related to use of sleeping medicines, talk to your doctor about this. You may need to reduce your dosage at bedtime if you awaken during the night to go to the bathroom.   To reduce the need for nighttime bathroom trips:   Avoid drinking fluids for several hours before going to bed   Empty your bladder before going to bed   Men can keep a urinal at the bedside   © 3852-7979 [employer], [address] [place] [address], [place] [address]. All rights reserved. This information is not intended as a  substitute for professional medical care. Always follow your healthcare professional's instructions.

## 2024-04-26 NOTE — NURSING
1352: Prolia 60 mg q 6 months  Last dose given-10/09/23    Any invasive dental procedures in past 3 months or upcoming 3 months: No    Last Rheumatology provider visit- Seen by MARK NGUYEN on 04/17/24    Recent labs? Yes;  CKD pt needing repeat labs in 10 days-No   Lab Results   Component Value Date    CALCIUM 9.7 04/15/2024   9.7  Lab Results   Component Value Date    CREATININE 1.2 04/15/2024   1.2  Lab Results   Component Value Date    ESTGFRAFRICA >60.0 09/03/2021   N/A  Lab Results   Component Value Date    EGFRNONAA >60.0 09/03/2021   45  Lab Results   Component Value Date    SIIGVHEX29AP 58 07/08/2020   N/A       Lot #- [id number]  Expiration Date- 06/30/26      Prolia 60 mg/ml administered SQ to Right upper arm Tolerated without any complaints. No redness, swelling, or drainage noted to site. Instructed to remain in clinic 15 minutes after administration to monitor for any s/sx of reaction. Pt instructed on signs and symptoms of reaction to report. Verbalizes understanding.

## 2024-04-30 ENCOUNTER — EXTERNAL CHRONIC CARE MANAGEMENT (OUTPATIENT)
Dept: PRIMARY CARE CLINIC | Facility: CLINIC | Age: 84
End: 2024-04-30
Payer: MEDICARE

## 2024-04-30 PROCEDURE — 99490 CHRNC CARE MGMT STAFF 1ST 20: CPT | Mod: PBBFAC,PO | Performed by: FAMILY MEDICINE

## 2024-04-30 PROCEDURE — 99490 CHRNC CARE MGMT STAFF 1ST 20: CPT | Mod: S$PBB,,, | Performed by: FAMILY MEDICINE

## 2024-05-22 ENCOUNTER — OFFICE VISIT (OUTPATIENT)
Dept: FAMILY MEDICINE | Facility: CLINIC | Age: 84
End: 2024-05-22
Payer: MEDICARE

## 2024-05-22 VITALS
TEMPERATURE: 99 F | HEIGHT: 62 IN | WEIGHT: 140.63 LBS | BODY MASS INDEX: 25.88 KG/M2 | SYSTOLIC BLOOD PRESSURE: 144 MMHG | OXYGEN SATURATION: 95 % | DIASTOLIC BLOOD PRESSURE: 70 MMHG | HEART RATE: 58 BPM | RESPIRATION RATE: 20 BRPM

## 2024-05-22 DIAGNOSIS — H91.93 BILATERAL HEARING LOSS, UNSPECIFIED HEARING LOSS TYPE: ICD-10-CM

## 2024-05-22 DIAGNOSIS — D84.821 IMMUNODEFICIENCY DUE TO CHEMOTHERAPY: ICD-10-CM

## 2024-05-22 DIAGNOSIS — F03.90 DEMENTIA WITHOUT BEHAVIORAL DISTURBANCE: ICD-10-CM

## 2024-05-22 DIAGNOSIS — E78.1 HYPERTRIGLYCERIDEMIA: Chronic | ICD-10-CM

## 2024-05-22 DIAGNOSIS — Z79.899 IMMUNODEFICIENCY DUE TO CHEMOTHERAPY: ICD-10-CM

## 2024-05-22 DIAGNOSIS — R32 URINARY INCONTINENCE, UNSPECIFIED TYPE: ICD-10-CM

## 2024-05-22 DIAGNOSIS — C50.112 MALIGNANT NEOPLASM OF CENTRAL PORTION OF LEFT BREAST IN FEMALE, ESTROGEN RECEPTOR POSITIVE: ICD-10-CM

## 2024-05-22 DIAGNOSIS — T45.1X5A IMMUNODEFICIENCY DUE TO CHEMOTHERAPY: ICD-10-CM

## 2024-05-22 DIAGNOSIS — C77.3 SECONDARY AND UNSPECIFIED MALIGNANT NEOPLASM OF AXILLA AND UPPER LIMB LYMPH NODES: ICD-10-CM

## 2024-05-22 DIAGNOSIS — T38.6X5A OSTEOPOROSIS DUE TO AROMATASE INHIBITOR: ICD-10-CM

## 2024-05-22 DIAGNOSIS — N18.32 STAGE 3B CHRONIC KIDNEY DISEASE: ICD-10-CM

## 2024-05-22 DIAGNOSIS — Z17.0 MALIGNANT NEOPLASM OF CENTRAL PORTION OF LEFT BREAST IN FEMALE, ESTROGEN RECEPTOR POSITIVE: ICD-10-CM

## 2024-05-22 DIAGNOSIS — Z00.00 ENCOUNTER FOR PREVENTIVE HEALTH EXAMINATION: Primary | ICD-10-CM

## 2024-05-22 DIAGNOSIS — M81.8 OSTEOPOROSIS DUE TO AROMATASE INHIBITOR: ICD-10-CM

## 2024-05-22 PROCEDURE — G0439 PPPS, SUBSEQ VISIT: HCPCS | Mod: ,,, | Performed by: NURSE PRACTITIONER

## 2024-05-22 PROCEDURE — 99214 OFFICE O/P EST MOD 30 MIN: CPT | Mod: PBBFAC,PO | Performed by: NURSE PRACTITIONER

## 2024-05-22 PROCEDURE — 99999 PR PBB SHADOW E&M-EST. PATIENT-LVL IV: CPT | Mod: PBBFAC,,, | Performed by: NURSE PRACTITIONER

## 2024-05-22 NOTE — PATIENT INSTRUCTIONS
Counseling and Referral of Other Preventative  (Italic type indicates deductible and co-insurance are waived)    Patient Name: Vanessa Vazquez  Today's Date: 5/22/2024    Health Maintenance       Date Due Completion Date    Shingles Vaccine (1 of 2) Never done ---    TETANUS VACCINE 01/03/2022 1/3/2012    COVID-19 Vaccine (5 - 2023-24 season) 02/06/2024 12/12/2023    Lipid Panel 08/15/2024 8/15/2023    DEXA Scan 09/14/2025 9/14/2022        No orders of the defined types were placed in this encounter.    The following information is provided to all patients.  This information is to help you find resources for any of the problems found today that may be affecting your health:                  Living healthy guide: www.[facility].[place].gov      Understanding Diabetes: www.diabetes.org      Eating healthy: www.cdc.gov/healthyweight      CDC home safety checklist: www.cdc.gov/steadi/patient.html      Agency on Aging: www.goea.[place].[facility]      Alcoholics anonymous (AA): www.aa.org      Physical Activity: www.paul.nih.gov/ow1jzbp      Tobacco use: www.quitwithusla.org

## 2024-05-22 NOTE — PROGRESS NOTES
"  Vanessa Vazquez presented for a  Medicare AWV and comprehensive Health Risk Assessment today. The following components were reviewed and updated:    Patient accompanied by   , who was present throughout the visit and aided in information gathering.        Medical history  Family History  Social history  Allergies and Current Medications  Health Risk Assessment  Health Maintenance  Care Team         ** See Completed Assessments for Annual Wellness Visit within the encounter summary.**         The following assessments were completed:  Living Situation  CAGE  Depression Screening  Timed Get Up and Go  Whisper Test  Cognitive Function Screening  Nutrition Screening  ADL Screening  PAQ Screening      Opioid documentation:      Patient does not have a current opioid prescription.        Vitals:    05/22/24 1029   BP: (!) 144/70   Pulse: (!) 58   Resp: 20   Temp: 98.5 °F (36.9 °C)   SpO2: 95%   Weight: 63.8 kg (140 lb 10.5 oz)   Height: 5' 2" (1.575 m)     Body mass index is 25.73 kg/m².  Physical Exam  Vitals and nursing note reviewed.   Constitutional:       Appearance: Normal appearance. She is well-developed.   HENT:      Head: Normocephalic and atraumatic.   Eyes:      Pupils: Pupils are equal, round, and reactive to light.   Neck:      Vascular: No carotid bruit.   Cardiovascular:      Rate and Rhythm: Normal rate and regular rhythm.      Pulses: Normal pulses.      Heart sounds: Normal heart sounds. No murmur heard.     No gallop.   Pulmonary:      Effort: Pulmonary effort is normal.      Breath sounds: Normal breath sounds.   Abdominal:      General: Bowel sounds are normal. There is no distension.      Palpations: Abdomen is soft.      Tenderness: There is no abdominal tenderness.   Musculoskeletal:         General: No tenderness. Normal range of motion.   Skin:     General: Skin is warm and dry.   Neurological:      Mental Status: She is alert.      Motor: No abnormal muscle tone.      Gait: Gait normal. "   Psychiatric:         Attention and Perception: Attention normal.         Mood and Affect: Mood normal.         Speech: Speech normal.         Behavior: Behavior normal.         Thought Content: Thought content normal.         Cognition and Memory: Cognition is impaired. Memory is impaired.         Judgment: Judgment normal.         Current Outpatient Medications:     anastrozole (ARIMIDEX) 1 mg Tab, TAKE 1 TABLET BY MOUTH ONCE DAILY, Disp: 90 tablet, Rfl: 0    aspirin (ECOTRIN) 81 MG EC tablet, Take 81 mg by mouth once daily., Disp: , Rfl:     donepeziL (ARICEPT) 10 MG tablet, Take 1 tablet (10 mg total) by mouth every evening., Disp: 30 tablet, Rfl: 11    pravastatin (PRAVACHOL) 40 MG tablet, TAKE ONE TABLET BY MOUTH ONE TIME DAILY, Disp: 90 tablet, Rfl: 3    vitamin D (VITAMIN D3) 1000 units Tab, Take 1,000 Units by mouth once daily., Disp: , Rfl:     calcium carbonate (OS-LÓPEZ) 500 mg calcium (1,250 mg) tablet, Take 1 tablet (500 mg total) by mouth once daily., Disp: 90 tablet, Rfl: 3    ondansetron (ZOFRAN-ODT) 8 MG TbDL, Take 1 tablet (8 mg total) by mouth every 8 (eight) hours as needed (Nausea). (Patient not taking: Reported on 5/22/2024), Disp: 10 tablet, Rfl: 0          Diagnoses and health risks identified today and associated recommendations/orders:    1. Encounter for preventive health examination  [facility] health mainteance      2. Bilateral hearing loss, unspecified hearing loss type  Chronic and Ongoing. Hearing wores- decline to wear hearing aides    3. Hypertriglyceridemia  Chronic and Stable on Pravastatin and diet Continue current treatment plan as previously prescribed with your PCP    4. Osteoporosis due to aromatase inhibitor  Chronic and Stable on Relast.infusion and vitamin D Continue current treatment plan as previously prescribed with your rheumatologsit    5. Dementia without behavioral disturbance  Chronic and Stable on Aricept-  states no changes in the last yr except for pt more  incont of urin/and bladder.- wearing depend    6. Secondary and unspecified malignant neoplasm of axilla and upper limb lymph nodes  Chronic and Stable renal failure. Continue current treatment plan as previously prescribed with your PCP    7. Malignant neoplasm of central portion of left breast in female, estrogen receptor positive   Malignant neoplasm of central portion of left breast in female, estrogen receptor positive  10/22 newly diagnosed left invasive duct cell carcinoma   11/15/22- Left simple breast mastectomy  Chronic/ Monitored/Stable on Arimidex as directed.  Followed by hem/oncologist    8. Stage 3b chronic kidney disease  Chronic and Stable renal failure. Continue current treatment plan as previously prescribed with your PCP    9. Immunodeficiency due to chemotherapy  Chronic and Stable. Continue current treatment plan as previously prescribed with your  radiation    10. Urinary incontinence, unspecified type  Chronic and Stable on Aricept-  states no changes in the last yr except for pt more incont of urin/and bladder.- wearing depend    Provided Vanessa with a 5-10 year written screening schedule and personal prevention plan. Recommendations were developed using the USPSTF age appropriate recommendations. Education, counseling, and referrals were provided as needed. After Visit Summary printed and given to patient which includes a list of additional screenings\tests needed.    Follow up in about 1 year (around 5/22/2025).    Lorri Fairbanks NP

## 2024-05-31 ENCOUNTER — EXTERNAL CHRONIC CARE MANAGEMENT (OUTPATIENT)
Dept: PRIMARY CARE CLINIC | Facility: CLINIC | Age: 84
End: 2024-05-31
Payer: MEDICARE

## 2024-05-31 PROCEDURE — 99490 CHRNC CARE MGMT STAFF 1ST 20: CPT | Mod: S$PBB,,, | Performed by: FAMILY MEDICINE

## 2024-05-31 PROCEDURE — 99490 CHRNC CARE MGMT STAFF 1ST 20: CPT | Mod: PBBFAC,PO | Performed by: FAMILY MEDICINE

## 2024-06-25 ENCOUNTER — OFFICE VISIT (OUTPATIENT)
Dept: SURGERY | Facility: CLINIC | Age: 84
End: 2024-06-25
Payer: MEDICARE

## 2024-06-25 DIAGNOSIS — Z17.0 MALIGNANT NEOPLASM OF CENTRAL PORTION OF LEFT BREAST IN FEMALE, ESTROGEN RECEPTOR POSITIVE: Primary | ICD-10-CM

## 2024-06-25 DIAGNOSIS — C50.112 MALIGNANT NEOPLASM OF CENTRAL PORTION OF LEFT BREAST IN FEMALE, ESTROGEN RECEPTOR POSITIVE: Primary | ICD-10-CM

## 2024-06-25 PROCEDURE — 99213 OFFICE O/P EST LOW 20 MIN: CPT | Mod: S$PBB,,, | Performed by: SURGERY

## 2024-06-25 NOTE — PROGRESS NOTES
Breast Surgical Oncology  [place]    Date of Service: 06/25/2024    DIAGNOSIS:   83 y.o. female with a history of left breast cancer.    Date of Diagnosis: 10/4/22  Pathology:  Invasive mammary carcinoma with lobular features  Clinical Stage:  Multicentric stage IA (cT1C cN0 MX)  Pathologic Stage:  Stage II A (pT3 pN1a MX)    TREATMENT:   Surgery: left total mastectomy with sentinel node biopsy on 11/15/22. Sabrina Leavitt M.D. Breast Surgical Oncology  Systemic Therapy: Endocrine therapy Arimidex from 1/11/23 to ongoing  Medical Oncologist: Zain Milton M.D.     Radiation Treatment Summary: PMRT simulation scheduled for 1/11/23  Radiation Oncologist: Rosa Carbajal M.D.     HISTORY OF PRESENT ILLNESS:   Vanessa Vazquez is here for oncological follow up.  Today, she denies new breast concerns such as pain, masses, skin changes, nipple discharge, nipple retraction or lumps under the arm.  She also denies bone pain, shortness of breath, abdominal pain and neurologic symptoms.     IMAGING:   No new     MEDICATIONS/ALLERGIES:     Current Outpatient Medications:     anastrozole (ARIMIDEX) 1 mg Tab, TAKE 1 TABLET BY MOUTH ONCE DAILY, Disp: 90 tablet, Rfl: 0    aspirin (ECOTRIN) 81 MG EC tablet, Take 81 mg by mouth once daily., Disp: , Rfl:     calcium carbonate (OS-LÓPEZ) 500 mg calcium (1,250 mg) tablet, Take 1 tablet (500 mg total) by mouth once daily., Disp: 90 tablet, Rfl: 3    donepeziL (ARICEPT) 10 MG tablet, Take 1 tablet (10 mg total) by mouth every evening., Disp: 30 tablet, Rfl: 11    ondansetron (ZOFRAN-ODT) 8 MG TbDL, Take 1 tablet (8 mg total) by mouth every 8 (eight) hours as needed (Nausea). (Patient not taking: Reported on 5/22/2024), Disp: 10 tablet, Rfl: 0    pravastatin (PRAVACHOL) 40 MG tablet, TAKE ONE TABLET BY MOUTH ONE TIME DAILY, Disp: 90 tablet, Rfl: 3    vitamin D (VITAMIN D3) 1000 units Tab, Take 1,000 Units by mouth once daily., Disp: , Rfl:   Review of patient's allergies  indicates:  Not on File    PHYSICAL EXAM:   General: The patient appears well and is in no acute distress.     BREAST EXAM  No Asymmetry  Right:  - Mass: No  - Skin change: No  - Nipple Discharge: No  - Nipple retraction: No  - Axillary LAD: No  Left: mastectomy   - Mass: No  - Skin change: No  - Axillary LAD: No  Full range of motion left upper extremity    ASSESSMENT:   Diagnoses and all orders for this visit:    Malignant neoplasm of central portion of left breast in female, estrogen receptor positive       PLAN:   Vanessa has a benign exam today without evidence of local or distant relapse.    She will return in December for her 2 year follow up visit, or sooner should she develop breast concerns.    Right breast screening mammograms will be due in December    The patient is in agreement with the plan. Questions were encouraged and answered to patient's satisfaction. Vanessa will call our office with any questions or concerns.     I spent a total of 25 minutes on this visit. This includes face to face time and non-face to face time preparing to see the patient (eg, review of tests), obtaining and/or reviewing separately obtained history, documenting clinical information in the electronic or other health record, independently interpreting results and communicating results to the patient/family/caregiver, or care coordinator.       Sabrina Leavitt M.D. were

## 2024-06-30 ENCOUNTER — EXTERNAL CHRONIC CARE MANAGEMENT (OUTPATIENT)
Dept: PRIMARY CARE CLINIC | Facility: CLINIC | Age: 84
End: 2024-06-30
Payer: MEDICARE

## 2024-06-30 PROCEDURE — 99490 CHRNC CARE MGMT STAFF 1ST 20: CPT | Mod: PBBFAC,PO | Performed by: FAMILY MEDICINE

## 2024-06-30 PROCEDURE — 99490 CHRNC CARE MGMT STAFF 1ST 20: CPT | Mod: S$PBB,,, | Performed by: FAMILY MEDICINE

## 2024-07-06 DIAGNOSIS — C50.112 MALIGNANT NEOPLASM OF CENTRAL PORTION OF LEFT BREAST IN FEMALE, ESTROGEN RECEPTOR POSITIVE: ICD-10-CM

## 2024-07-06 DIAGNOSIS — Z17.0 MALIGNANT NEOPLASM OF CENTRAL PORTION OF LEFT BREAST IN FEMALE, ESTROGEN RECEPTOR POSITIVE: ICD-10-CM

## 2024-07-08 RX ORDER — ANASTROZOLE 1 MG/1
1 TABLET ORAL
Qty: 90 TABLET | Refills: 0 | Status: SHIPPED | OUTPATIENT
Start: 2024-07-08

## 2024-07-31 ENCOUNTER — EXTERNAL CHRONIC CARE MANAGEMENT (OUTPATIENT)
Dept: PRIMARY CARE CLINIC | Facility: CLINIC | Age: 84
End: 2024-07-31
Payer: MEDICARE

## 2024-07-31 PROCEDURE — 99490 CHRNC CARE MGMT STAFF 1ST 20: CPT | Mod: PBBFAC,PO | Performed by: FAMILY MEDICINE

## 2024-07-31 PROCEDURE — 99490 CHRNC CARE MGMT STAFF 1ST 20: CPT | Mod: S$PBB,,, | Performed by: FAMILY MEDICINE

## 2024-08-16 ENCOUNTER — LAB VISIT (OUTPATIENT)
Dept: LAB | Facility: HOSPITAL | Age: 84
End: 2024-08-16
Attending: FAMILY MEDICINE
Payer: MEDICARE

## 2024-08-16 ENCOUNTER — OFFICE VISIT (OUTPATIENT)
Dept: FAMILY MEDICINE | Facility: CLINIC | Age: 84
End: 2024-08-16
Payer: MEDICARE

## 2024-08-16 VITALS
DIASTOLIC BLOOD PRESSURE: 70 MMHG | WEIGHT: 141.13 LBS | OXYGEN SATURATION: 96 % | BODY MASS INDEX: 25.97 KG/M2 | HEIGHT: 62 IN | HEART RATE: 70 BPM | SYSTOLIC BLOOD PRESSURE: 138 MMHG | RESPIRATION RATE: 18 BRPM | TEMPERATURE: 98 F

## 2024-08-16 DIAGNOSIS — F03.90 DEMENTIA WITHOUT BEHAVIORAL DISTURBANCE: Primary | Chronic | ICD-10-CM

## 2024-08-16 DIAGNOSIS — Z86.73 HISTORY OF CVA (CEREBROVASCULAR ACCIDENT): ICD-10-CM

## 2024-08-16 DIAGNOSIS — Z17.0 MALIGNANT NEOPLASM OF CENTRAL PORTION OF LEFT BREAST IN FEMALE, ESTROGEN RECEPTOR POSITIVE: ICD-10-CM

## 2024-08-16 DIAGNOSIS — N18.32 STAGE 3B CHRONIC KIDNEY DISEASE: Chronic | ICD-10-CM

## 2024-08-16 DIAGNOSIS — E78.1 HYPERTRIGLYCERIDEMIA: Chronic | ICD-10-CM

## 2024-08-16 DIAGNOSIS — F03.90 DEMENTIA WITHOUT BEHAVIORAL DISTURBANCE: Chronic | ICD-10-CM

## 2024-08-16 DIAGNOSIS — C50.112 MALIGNANT NEOPLASM OF CENTRAL PORTION OF LEFT BREAST IN FEMALE, ESTROGEN RECEPTOR POSITIVE: ICD-10-CM

## 2024-08-16 PROBLEM — R26.89 DECREASED FUNCTIONAL MOBILITY: Status: RESOLVED | Noted: 2022-11-07 | Resolved: 2024-08-16

## 2024-08-16 LAB
ALBUMIN SERPL BCP-MCNC: 3.9 G/DL (ref 3.5–5.2)
ALP SERPL-CCNC: 48 U/L (ref 55–135)
ALT SERPL W/O P-5'-P-CCNC: 28 U/L (ref 10–44)
ANION GAP SERPL CALC-SCNC: 13 MMOL/L (ref 8–16)
AST SERPL-CCNC: 23 U/L (ref 10–40)
BASOPHILS # BLD AUTO: 0.09 K/UL (ref 0–0.2)
BASOPHILS NFR BLD: 1.4 % (ref 0–1.9)
BILIRUB SERPL-MCNC: 0.3 MG/DL (ref 0.1–1)
BUN SERPL-MCNC: 14 MG/DL (ref 8–23)
CALCIUM SERPL-MCNC: 9.7 MG/DL (ref 8.7–10.5)
CHLORIDE SERPL-SCNC: 108 MMOL/L (ref 95–110)
CHOLEST SERPL-MCNC: 207 MG/DL (ref 120–199)
CHOLEST/HDLC SERPL: 4.7 {RATIO} (ref 2–5)
CO2 SERPL-SCNC: 20 MMOL/L (ref 23–29)
CREAT SERPL-MCNC: 0.9 MG/DL (ref 0.5–1.4)
DIFFERENTIAL METHOD BLD: ABNORMAL
EOSINOPHIL # BLD AUTO: 0.3 K/UL (ref 0–0.5)
EOSINOPHIL NFR BLD: 4 % (ref 0–8)
ERYTHROCYTE [DISTWIDTH] IN BLOOD BY AUTOMATED COUNT: 14.2 % (ref 11.5–14.5)
EST. GFR  (NO RACE VARIABLE): >60 ML/MIN/1.73 M^2
GLUCOSE SERPL-MCNC: 83 MG/DL (ref 70–110)
HCT VFR BLD AUTO: 40.8 % (ref 37–48.5)
HDLC SERPL-MCNC: 44 MG/DL (ref 40–75)
HDLC SERPL: 21.3 % (ref 20–50)
HGB BLD-MCNC: 13 G/DL (ref 12–16)
IMM GRANULOCYTES # BLD AUTO: 0.04 K/UL (ref 0–0.04)
IMM GRANULOCYTES NFR BLD AUTO: 0.6 % (ref 0–0.5)
LDLC SERPL CALC-MCNC: 116 MG/DL (ref 63–159)
LYMPHOCYTES # BLD AUTO: 1.5 K/UL (ref 1–4.8)
LYMPHOCYTES NFR BLD: 23.2 % (ref 18–48)
MCH RBC QN AUTO: 30 PG (ref 27–31)
MCHC RBC AUTO-ENTMCNC: 31.9 G/DL (ref 32–36)
MCV RBC AUTO: 94 FL (ref 82–98)
MONOCYTES # BLD AUTO: 0.5 K/UL (ref 0.3–1)
MONOCYTES NFR BLD: 7.3 % (ref 4–15)
NEUTROPHILS # BLD AUTO: 4 K/UL (ref 1.8–7.7)
NEUTROPHILS NFR BLD: 63.5 % (ref 38–73)
NONHDLC SERPL-MCNC: 163 MG/DL
NRBC BLD-RTO: 0 /100 WBC
PLATELET # BLD AUTO: 246 K/UL (ref 150–450)
PMV BLD AUTO: 10.9 FL (ref 9.2–12.9)
POTASSIUM SERPL-SCNC: 4.2 MMOL/L (ref 3.5–5.1)
PROT SERPL-MCNC: 7.6 G/DL (ref 6–8.4)
RBC # BLD AUTO: 4.34 M/UL (ref 4–5.4)
SODIUM SERPL-SCNC: 141 MMOL/L (ref 136–145)
TRIGL SERPL-MCNC: 235 MG/DL (ref 30–150)
TSH SERPL DL<=0.005 MIU/L-ACNC: 2.93 UIU/ML (ref 0.4–4)
WBC # BLD AUTO: 6.26 K/UL (ref 3.9–12.7)

## 2024-08-16 PROCEDURE — 80061 LIPID PANEL: CPT | Performed by: FAMILY MEDICINE

## 2024-08-16 PROCEDURE — 99214 OFFICE O/P EST MOD 30 MIN: CPT | Mod: PBBFAC,PO | Performed by: FAMILY MEDICINE

## 2024-08-16 PROCEDURE — 80053 COMPREHEN METABOLIC PANEL: CPT | Performed by: FAMILY MEDICINE

## 2024-08-16 PROCEDURE — 84443 ASSAY THYROID STIM HORMONE: CPT | Performed by: FAMILY MEDICINE

## 2024-08-16 PROCEDURE — 36415 COLL VENOUS BLD VENIPUNCTURE: CPT | Mod: PO | Performed by: FAMILY MEDICINE

## 2024-08-16 PROCEDURE — 99999 PR PBB SHADOW E&M-EST. PATIENT-LVL IV: CPT | Mod: PBBFAC,,, | Performed by: FAMILY MEDICINE

## 2024-08-16 PROCEDURE — 85025 COMPLETE CBC W/AUTO DIFF WBC: CPT | Performed by: FAMILY MEDICINE

## 2024-08-16 NOTE — PROGRESS NOTES
CHIEF COMPLAINT:  This is a 84-year-old female here for follow up chronic medical conditions and for preventive health exam.     SUBJECTIVE: Patient reports that she is doing well and has no complaints. She is accompanied with her . The patient is status post mastectomy in November 2022 for left breast cancer followed by radiation. She is currently taking Arimidex. She continues to take Aricept for dementia.  She has chronic kidney disease stage 3 and hypertriglyceridemia.  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  She exercises by walking with her  around the neighborhood.  Her  reports that she has urinary incontinence and the patient wears adult diaper. She is occasionally incontinent of stool. The patient has osteopenia.  She has been on a drug holiday from [employer] since 2017.     Eye exam September 2022. Mammogram December 2023. Bone DEXA scan September 2022 (osteopenia with stable bone mineral density).  Pap smear October 2009.  Colonoscopy January 2009. Tdap January 2012. Pneumovax October 2008.  Prevnar July 2015. RSV vaccine December 2023.  Influenza vaccine December 2023. COVID 19 vaccine January, February, December 2021, December 2023.     ROS:  GENERAL: Patient denies fever, chills, night sweats. Patient denies weight gain or loss. Patient denies anorexia, fatigue, weakness or swollen glands.  SKIN: Patient denies rash or hair loss.  HEENT: Patient denies sore throat, ear pain, hearing loss, nasal congestion, or runny nose. Patient denies visual disturbance, eye irritation or discharge.  LUNGS: Patient denies cough, wheeze or hemoptysis.  CARDIOVASCULAR: Patient denies chest pain, shortness of breath, palpitations, syncope or lower extremity edema.  GI: Patient denies abdominal pain, nausea, vomiting, diarrhea, constipation, blood in stool or melena.  GENITOURINARY: Patient denies pelvic  pain, vaginal discharge, itch or odor. Patient denies irregular vaginal bleeding. Patient denies dysuria, frequency, hematuria, nocturia, urgency or incontinence.  BREASTS: Patient denies breast pain, mass or nipple discharge.  MUSCULOSKELETAL: Patient denies joint pain, swelling, redness or warmth.  NEUROLOGIC: Patient denies headache, vertigo, paresthesias, weakness in limb, dysarthria, dysphagia or abnormality of gait.  PSYCHIATRIC: Patient denies anxiety, depression, or memory loss.     OBJECTIVE:   GENERAL: Well-developed well-nourished, overweight elderly, white female alert and oriented x3, in no acute distress.  Mild to moderate cognitive impairment.  Repeats herself frequently.  No hallucinations or delusions.   SKIN: Clear without rash. Normal color and tone.  Dry skin.  Multiple SKs.  HEENT: Eyes: Clear conjunctivae. No scleral icterus. Pupils equal reactive to light and accommodation. Ears: Hearing aids. Clear TMs. Clear canals. Nose: Without congestion. Pharynx: Without injection or exudates.  NECK: Supple, normal range of motion. No masses, lymphadenopathy or enlarged thyroid. No JVD. Carotids 2+ and equal. No bruits.  LUNGS: Clear to auscultation. Normal respiratory effort.  CARDIOVASCULAR: Regular rhythm, normal S1, S2 without murmur, gallop or rub.  BACK: No CVA or spinal tenderness.  BREASTS: Deferred.  ABDOMEN: Normal appearance. Active bowel sounds. Soft, nontender without mass or organomegaly. No rebound or guarding.  EXTREMITIES: Without cyanosis, clubbing or edema. Distal pulses 2+ and equal. Normal range of motion in all extremities. No joint effusion, erythema or warmth.  NEUROLOGIC: Cranial nerves II through XII without deficit. Motor strength equal bilaterally. Sensation normal to touch. Deep tendon reflexes 2+ and equal. Gait without abnormality. No tremor. Negative cerebellar signs.   PELVIC:  Deferred per patient request.    ASSESSMENT:  1. Dementia without behavioral disturbance    2.  Hypertriglyceridemia    3. Stage 3b chronic kidney disease    4. Malignant neoplasm of central portion of left breast in female, estrogen receptor positive    5. History of CVA (cerebrovascular accident)      PLAN:  1. Weight reduction. Exercise regularly.  2. Age-appropriate counseling.  3. Fasting lab.  4. Refill medications.   5. Follow up in 6-12 months.    30 minutes of total time spent on the encounter, which includes face to face time and non-face to face time preparing to see the patient (eg, review of tests), Obtaining and/or reviewing separately obtained history, Documenting clinical information in the electronic or other health record, Independently interpreting results (not separately reported) and communicating results to the patient/family/caregiver, or Care coordination (not separately reported).     This note is generated with speech recognition software and is subject to transcription error and sound alike phrases that may be missed by proofreading.

## 2024-08-28 ENCOUNTER — PATIENT MESSAGE (OUTPATIENT)
Dept: SURGERY | Facility: CLINIC | Age: 84
End: 2024-08-28
Payer: MEDICARE

## 2024-08-31 ENCOUNTER — EXTERNAL CHRONIC CARE MANAGEMENT (OUTPATIENT)
Dept: PRIMARY CARE CLINIC | Facility: CLINIC | Age: 84
End: 2024-08-31
Payer: MEDICARE

## 2024-08-31 PROCEDURE — 99490 CHRNC CARE MGMT STAFF 1ST 20: CPT | Mod: S$PBB,,, | Performed by: FAMILY MEDICINE

## 2024-08-31 PROCEDURE — 99490 CHRNC CARE MGMT STAFF 1ST 20: CPT | Mod: PBBFAC,PO | Performed by: FAMILY MEDICINE

## 2024-09-18 ENCOUNTER — APPOINTMENT (OUTPATIENT)
Dept: RADIOLOGY | Facility: HOSPITAL | Age: 84
End: 2024-09-18
Attending: NURSE PRACTITIONER
Payer: MEDICARE

## 2024-09-18 DIAGNOSIS — M85.859 OSTEOPENIA OF HIP, UNSPECIFIED LATERALITY: ICD-10-CM

## 2024-09-18 DIAGNOSIS — M85.88 OTHER SPECIFIED DISORDERS OF BONE DENSITY AND STRUCTURE, OTHER SITE: ICD-10-CM

## 2024-09-18 PROCEDURE — 77080 DXA BONE DENSITY AXIAL: CPT | Mod: TC

## 2024-09-18 PROCEDURE — 77080 DXA BONE DENSITY AXIAL: CPT | Mod: 26,,, | Performed by: RADIOLOGY

## 2024-09-30 ENCOUNTER — EXTERNAL CHRONIC CARE MANAGEMENT (OUTPATIENT)
Dept: PRIMARY CARE CLINIC | Facility: CLINIC | Age: 84
End: 2024-09-30
Payer: MEDICARE

## 2024-09-30 PROCEDURE — 99490 CHRNC CARE MGMT STAFF 1ST 20: CPT | Mod: PBBFAC,PO | Performed by: FAMILY MEDICINE

## 2024-09-30 PROCEDURE — 99490 CHRNC CARE MGMT STAFF 1ST 20: CPT | Mod: S$PBB,,, | Performed by: FAMILY MEDICINE

## 2024-09-30 RX ORDER — DONEPEZIL HYDROCHLORIDE 10 MG/1
10 TABLET, FILM COATED ORAL NIGHTLY
Qty: 30 TABLET | Refills: 11 | Status: SHIPPED | OUTPATIENT
Start: 2024-09-30

## 2024-09-30 NOTE — TELEPHONE ENCOUNTER
Refill Routing Note   Medication(s) are not appropriate for processing by [facility] for the following reason(s):        Outside of protocol    [facility] action(s):  Route               Appointments  past 12m or future 3m with PCP    Date Provider   Last Visit   8/16/2024 Magui Jc MD   Next Visit   Visit date not found Magui Jc MD   ED visits in past 90 days: 0        Note composed:2:28 PM 09/30/2024

## 2024-10-05 DIAGNOSIS — C50.112 MALIGNANT NEOPLASM OF CENTRAL PORTION OF LEFT BREAST IN FEMALE, ESTROGEN RECEPTOR POSITIVE: ICD-10-CM

## 2024-10-05 DIAGNOSIS — Z17.0 MALIGNANT NEOPLASM OF CENTRAL PORTION OF LEFT BREAST IN FEMALE, ESTROGEN RECEPTOR POSITIVE: ICD-10-CM

## 2024-10-07 RX ORDER — ANASTROZOLE 1 MG/1
1 TABLET ORAL
Qty: 90 TABLET | Refills: 0 | Status: SHIPPED | OUTPATIENT
Start: 2024-10-07

## 2024-10-18 RX ORDER — PRAVASTATIN SODIUM 40 MG/1
TABLET ORAL
Qty: 90 TABLET | Refills: 3 | Status: SHIPPED | OUTPATIENT
Start: 2024-10-18

## 2024-10-18 NOTE — TELEPHONE ENCOUNTER
No care due was identified.  [facility] Embedded Care Due Messages. Reference number: [id number].   10/18/2024 10:05:43 AM CDT

## 2024-10-19 NOTE — TELEPHONE ENCOUNTER
Refill Decision Note   Vanessa Vazquez  is requesting a refill authorization.  Brief Assessment and Rationale for Refill:  Approve     Medication Therapy Plan:         Comments:     Note composed:11:43 PM 10/18/2024

## 2024-10-24 NOTE — PROGRESS NOTES
Subjective:       Patient ID: Vanessa Vazquez is a 84 y.o. female.    Chief Complaint:   1. Malignant neoplasm of central portion of left breast in female, estrogen receptor positive  Stage IB (pT3, pN1(sn), cM0, G1, ER+, MN+, HER2-, Oncotype DX score: 13)      2. Osteopenia of hip, unspecified laterality          Current Treatment:  OP ANASTROZOLE DAILY started in 1/2023    DENOSUMAB (PROLIA) Q6M     Treatment History:  S/p mastectomy on 11/15/2022 by Dr. Leavitt        S/p XRT     HPI: This is an 84 year old  woman with osteopenia, CVA, hearing loss, stage 3 CKD, fibrocystic breast disease, hyperlipidemia, dementia, and osteoarthritis of her thumb who is seen in Hem/Onc for Stage IB breast cancer. Screening MMG in 9/2022 revealed an asymmetry/mass with architectural distortion persists in the upper inner breast. US demonstrated an irregular hypoechoic mass measuring 1.7 x 1.2 cm at the 10 o'clock position. Biopsy proved IDC, grade 1, ER >95%, MN >95%, Her2 negative. She underwent mastectomy on 11/15/2022 by Dr. Leavitt; pathology showed IDC, grade 1, 5cm, negative for LVI. 1/2 nodes positive for carcinoma, 2mm. Oncotype recurrence score is 13.     She was started on Arimidex 1mg po daily in 1/2023 to complete 10 years of therapy. DEXA in 9/2022 showed osteopenia, and she was started on Prolia every 6 months.     Her primary Hematologist/Oncologist is Dr. Milton.    Interval History: Patient presents for follow up on Arimidex and Prolia; She is scheduled to receive C4D1 of Prolia today. She presents with her  and has no complaints today. She is adherent to Arimidex with no hot flashes or joint pains. CBC unremarkable. CMP stable with slight decline in eGFR despite normal BUN and creatinine. Ca 9.7; will proceed with Prolia today.     Reviewed labs with patient:   CBC:   Recent Labs   Lab 10/25/24  1222   WBC 7.26   RBC 4.19   Hemoglobin 12.5   Hematocrit 37.6   Platelets 259   MCV 90   MCH 29.8    MCHC 33.2     CMP:  Recent Labs   Lab 10/25/24  1222   Glucose 142 H   Calcium 9.7   Albumin 3.7   Total Protein 7.6   Sodium 141   Potassium 4.3   CO2 20 L   Chloride 107   BUN 14   Creatinine 1.2   Alkaline Phosphatase 56   ALT 20   AST 18   Total Bilirubin 0.3     DXA Bone Density Axial Skeleton 1 or more sites  Narrative: EXAMINATION:  DXA BONE DENSITY AXIAL SKELETON 1 OR MORE SITES    CLINICAL HISTORY:  starting aromatase inhibitor therapy from breast cancer; Other specified disorders of bone density and structure, unspecified thigh    TECHNIQUE:  DXA scanning was performed over the left hip and lumbar spine.  Review of the images confirms satisfactory positioning and technique.    COMPARISON:  09/14/2022    FINDINGS:  The L1 to L4 vertebral bone mineral density is equal to 1.252 g/cm squared with a T score of 0.5.  There has been a positive 7.5% statistically significant change relative to the prior study.    The left femoral neck bone mineral density is equal to 0.851 g/cm squared with a T score of -1.3.  There has been  a positive 4.9% statistically significant change relative to the prior study.    There is a 19.0% risk of a major osteoporotic fracture and a 4.4% risk of hip fracture in the next 10 years (FRAX).  Impression: Osteopenia    Consider FDA approved medical therapies in postmenopausal women and men aged 50 years and older, based on the following:    *A hip or vertebral (clinical or morphometric) fracture  *T score less than or equal to -2.5 at the femoral neck or spine after appropriate evaluation to exclude secondary causes.  *Low bone mass -- also known as osteopenia (T score between -1.0 and -2.5 at the femoral neck or spine) and a 10 year probability of hip fracture greater than or equal to 3% or a 10 year probability of major osteoporosis-related fracture greater than or equal to 20% based on the [place]-adapted WHO algorithm.  *Clinicians judgment and/or patient preference may indicate  treatment for people with 10 year fracture probabilities is above or below these levels.    Electronically signed by: Marquez Diaz DO  Date:    09/19/2024  Time:    11:40    Social History     Socioeconomic History    Marital status:    Tobacco Use    Smoking status: Never    Smokeless tobacco: Never   Substance and Sexual Activity    Alcohol use: No    Drug use: No    Sexual activity: Not Currently   Social History Narrative    The patient is  and the mother of 2 adult children. She retired from [employer] as .  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  She drives occasionally.     Social Drivers of Health     Financial Resource Strain: Low Risk  (5/22/2024)    Overall Financial Resource Strain (CARDIA)     Difficulty of Paying Living Expenses: Not hard at all   Food Insecurity: No Food Insecurity (5/22/2024)    Hunger Vital Sign     Worried About Running Out of Food in the Last Year: Never true     Ran Out of Food in the Last Year: Never true   Transportation Needs: Unknown (5/22/2024)    PRAPARE - Transportation     Lack of Transportation (Medical): No   Physical Activity: Sufficiently Active (5/22/2024)    Exercise Vital Sign     Days of Exercise per Week: 7 days     Minutes of Exercise per Session: 30 min   Stress: No Stress Concern Present (5/22/2024)    Citizen of [place] of Occupational Health - Occupational Stress Questionnaire     Feeling of Stress : Not at all   Housing Stability: Unknown (5/15/2023)    Housing Stability Vital Sign     Unable to Pay for Housing in the Last Year: No     Unstable Housing in the Last Year: No     Past Medical History:   Diagnosis Date    Breast cancer     Left, radiation    CKD (chronic kidney disease), stage III     Dementia     Fibrocystic breast disease     Hearing loss     Hyperlipidemia     Osteoarthritis of thumb     Osteopenia     Stroke     Incidental finding in 2012.      Family History   Problem Relation Name Age of Onset    Glaucoma Brother      Hyperlipidemia Mother      Heart disease Father      Osteoporosis Cousin      Coronary artery disease Maternal Uncle       Past Surgical History:   Procedure Laterality Date    BREAST BIOPSY Left 05/2018    Benign    CATARACT EXTRACTION EXTRACAPSULAR W/ INTRAOCULAR LENS IMPLANTATION      SENTINEL LYMPH NODE BIOPSY Left 11/15/2022    Procedure: BIOPSY, LYMPH NODE, SENTINEL;  Surgeon: Sabrina Leavitt MD;  Location: [facility];  Service: General;  Laterality: Left;    SIMPLE MASTECTOMY Left 11/15/2022    Procedure: MASTECTOMY, SIMPLE;  Surgeon: Sabrina Leavitt MD;  Location: [facility] OR;  Service: General;  Laterality: Left;     Review of Systems   Constitutional:  Negative for appetite change and fatigue.   HENT:  Negative for mouth sores, rhinorrhea and sore throat.    Eyes: Negative.    Respiratory: Negative.     Cardiovascular: Negative.    Gastrointestinal:  Negative for constipation, diarrhea, nausea and vomiting.   Endocrine: Negative.    Genitourinary: Negative.  Negative for hot flashes.   Musculoskeletal: Negative.    Integumentary:  Negative.   Allergic/Immunologic: Negative.    Neurological:  Negative for weakness and numbness.   Hematological: Negative.    Psychiatric/Behavioral: Negative.         Medication List with Changes/Refills   Current Medications    ASPIRIN (ECOTRIN) 81 MG EC TABLET    Take 81 mg by mouth once daily.    CALCIUM CARBONATE (OS-LÓPEZ) 500 MG CALCIUM (1,250 MG) TABLET    Take 1 tablet (500 mg total) by mouth once daily.    DONEPEZIL (ARICEPT) 10 MG TABLET    Take 1 tablet (10 mg total) by mouth every evening.    PRAVASTATIN (PRAVACHOL) 40 MG TABLET    TAKE ONE TABLET BY MOUTH ONE TIME DAILY    VITAMIN D (VITAMIN D3) 1000 UNITS TAB    Take 1,000 Units by mouth once daily.   Changed and/or Refilled Medications    Modified Medication Previous Medication    ANASTROZOLE (ARIMIDEX) 1 MG TAB anastrozole  (ARIMIDEX) 1 mg Tab       Take 1 tablet (1 mg total) by mouth once daily.    TAKE 1 TABLET BY MOUTH ONCE DAILY     Objective:     Vitals:    10/25/24 1302   BP: 138/76   Pulse: 69   Temp: 98.2 °F (36.8 °C)     Physical Exam  Vitals reviewed.   Constitutional:       Appearance: Normal appearance.   HENT:      Head: Normocephalic.      Mouth/Throat:      Comments:     Eyes:      Extraocular Movements: Extraocular movements intact.      Pupils: Pupils are equal, round, and reactive to light.   Cardiovascular:      Rate and Rhythm: Normal rate and regular rhythm.      Heart sounds: Normal heart sounds.   Pulmonary:      Effort: Pulmonary effort is normal.      Breath sounds: Normal breath sounds.   Abdominal:      General: Bowel sounds are normal.      Palpations: Abdomen is soft.      Comments: rounded     Genitourinary:     Comments: deferred    Musculoskeletal:         General: Normal range of motion.      Cervical back: Normal range of motion and neck supple.   Skin:     General: Skin is warm and dry.   Neurological:      Mental Status: She is alert and oriented to person, place, and time.      Comments: Repeats herself and asks the same questions over and over as is typical of dementia   Psychiatric:         Behavior: Behavior normal.         Thought Content: Thought content normal.          (2) Ambulatory and capable of self care, unable to carry out work activity, up and about > 50% or waking hours  Assessment:     Problem List Items Addressed This Visit          Oncology    Malignant neoplasm of central portion of left breast in female, estrogen receptor positive - Primary     Diagnosed on screening MMG. S/p mastectomy in 11/2022 followed by XRT. currenlty on Arimidex daily.          Relevant Medications    anastrozole (ARIMIDEX) 1 mg Tab    Other Relevant Orders    CBC Auto Differential    Comprehensive Metabolic Panel       Orthopedic    Osteopenia of hip     Noted on DEXA in 9/2022. Currently on Prolia every  6 months. Improved with recent DEXA.          Relevant Orders    CBC Auto Differential    Comprehensive Metabolic Panel     Plan:     Malignant neoplasm of central portion of left breast in female, estrogen receptor positive  -     CBC Auto Differential; Future; Expected date: 04/24/2025  -     Comprehensive Metabolic Panel; Future; Expected date: 04/24/2025  -     anastrozole (ARIMIDEX) 1 mg Tab; Take 1 tablet (1 mg total) by mouth once daily.  Dispense: 90 tablet; Refill: 2    Osteopenia of hip, unspecified laterality  -     CBC Auto Differential; Future; Expected date: 04/24/2025  -     Comprehensive Metabolic Panel; Future; Expected date: 04/24/2025    Other orders  -     Cancel: denosumab (PROLIA) injection 60 mg    Labs reviewed.   Continue Arimidex with calcium + vitamin D daily; refill sent to patient's pharmacy.   Ok to proceed with C4D1 of Prolia next week; Ca ++ 9.7.  Follow up in  6 months with CBC and Comprehensive Metabolic Panel prior to C5D1.  Repeat DEXA in 9/2026.   Surveillance MMG due 12/2024; scheduled for 12/17/2024.     Per NCCN Guidelines BINV-17:  H&P 1-4 times per year as clinically appropriate for 5 years then annually  Periodic screening for changes in family history and genetic testing indications and referral to genetic counseling as indicated  Educate, monitor, and refer for lymphedema management  MMG every 12 months  Routine imaging of reconstructed breast is not indicated  For patients receiving anthracycline-based therapy, see NCCN Guidelines for Survivorship for echocardiogram recommendations  In the absence of clinical signs and symptoms suggestive of recurrent disease, there is no indication for lab or imaging studies for metastases screening    Route Chart for Scheduling    Med Onc Chart Routing      Follow up with physician    Follow up with ARNIE 6 months.   Infusion scheduling note   Prolia in 6 months   Injection scheduling note    Labs CBC and CMP   Scheduling:  Preferred  lab:  Lab interval:     Imaging Mammogram   already scheduled   Pharmacy appointment No pharmacy appointment needed      Other referrals       No additional referrals needed             I will review assessment/plan with collaborating physician.      ORTEGA Potts

## 2024-10-25 ENCOUNTER — INFUSION (OUTPATIENT)
Dept: INFUSION THERAPY | Facility: HOSPITAL | Age: 84
End: 2024-10-25
Attending: INTERNAL MEDICINE
Payer: MEDICARE

## 2024-10-25 ENCOUNTER — OFFICE VISIT (OUTPATIENT)
Dept: HEMATOLOGY/ONCOLOGY | Facility: CLINIC | Age: 84
End: 2024-10-25
Payer: MEDICARE

## 2024-10-25 VITALS
DIASTOLIC BLOOD PRESSURE: 76 MMHG | SYSTOLIC BLOOD PRESSURE: 138 MMHG | BODY MASS INDEX: 26.01 KG/M2 | WEIGHT: 141.31 LBS | HEIGHT: 62 IN | HEART RATE: 69 BPM | TEMPERATURE: 98 F

## 2024-10-25 DIAGNOSIS — Z17.0 MALIGNANT NEOPLASM OF CENTRAL PORTION OF LEFT BREAST IN FEMALE, ESTROGEN RECEPTOR POSITIVE: Primary | ICD-10-CM

## 2024-10-25 DIAGNOSIS — T38.6X5A OSTEOPOROSIS DUE TO AROMATASE INHIBITOR: Primary | ICD-10-CM

## 2024-10-25 DIAGNOSIS — M85.859 OSTEOPENIA OF HIP, UNSPECIFIED LATERALITY: ICD-10-CM

## 2024-10-25 DIAGNOSIS — C50.112 MALIGNANT NEOPLASM OF CENTRAL PORTION OF LEFT BREAST IN FEMALE, ESTROGEN RECEPTOR POSITIVE: Primary | ICD-10-CM

## 2024-10-25 DIAGNOSIS — M81.8 OSTEOPOROSIS DUE TO AROMATASE INHIBITOR: Primary | ICD-10-CM

## 2024-10-25 PROCEDURE — 99213 OFFICE O/P EST LOW 20 MIN: CPT | Mod: PBBFAC,25 | Performed by: NURSE PRACTITIONER

## 2024-10-25 PROCEDURE — 63600175 PHARM REV CODE 636 W HCPCS: Mod: JZ,JG | Performed by: NURSE PRACTITIONER

## 2024-10-25 PROCEDURE — 96372 THER/PROPH/DIAG INJ SC/IM: CPT

## 2024-10-25 PROCEDURE — 99999 PR PBB SHADOW E&M-EST. PATIENT-LVL III: CPT | Mod: PBBFAC,,, | Performed by: NURSE PRACTITIONER

## 2024-10-25 RX ORDER — ANASTROZOLE 1 MG/1
1 TABLET ORAL DAILY
Qty: 90 TABLET | Refills: 2 | Status: SHIPPED | OUTPATIENT
Start: 2024-10-25

## 2024-10-25 RX ADMIN — DENOSUMAB 60 MG: 60 INJECTION SUBCUTANEOUS at 01:10

## 2024-10-31 ENCOUNTER — EXTERNAL CHRONIC CARE MANAGEMENT (OUTPATIENT)
Dept: PRIMARY CARE CLINIC | Facility: CLINIC | Age: 84
End: 2024-10-31
Payer: MEDICARE

## 2024-10-31 PROCEDURE — 99490 CHRNC CARE MGMT STAFF 1ST 20: CPT | Mod: S$PBB,,, | Performed by: FAMILY MEDICINE

## 2024-10-31 PROCEDURE — 99490 CHRNC CARE MGMT STAFF 1ST 20: CPT | Mod: PBBFAC,PO | Performed by: FAMILY MEDICINE

## 2024-11-30 ENCOUNTER — EXTERNAL CHRONIC CARE MANAGEMENT (OUTPATIENT)
Dept: PRIMARY CARE CLINIC | Facility: CLINIC | Age: 84
End: 2024-11-30
Payer: MEDICARE

## 2024-11-30 PROCEDURE — 99490 CHRNC CARE MGMT STAFF 1ST 20: CPT | Mod: S$PBB,,, | Performed by: FAMILY MEDICINE

## 2024-11-30 PROCEDURE — 99490 CHRNC CARE MGMT STAFF 1ST 20: CPT | Mod: PBBFAC,PO | Performed by: FAMILY MEDICINE

## 2024-12-17 ENCOUNTER — HOSPITAL ENCOUNTER (OUTPATIENT)
Dept: RADIOLOGY | Facility: HOSPITAL | Age: 84
Discharge: HOME | End: 2024-12-17
Attending: SURGERY
Payer: MEDICARE

## 2024-12-17 VITALS — BODY MASS INDEX: 26.01 KG/M2 | HEIGHT: 62 IN | WEIGHT: 141.31 LBS

## 2024-12-17 DIAGNOSIS — Z17.0 MALIGNANT NEOPLASM OF CENTRAL PORTION OF LEFT BREAST IN FEMALE, ESTROGEN RECEPTOR POSITIVE: ICD-10-CM

## 2024-12-17 DIAGNOSIS — C50.112 MALIGNANT NEOPLASM OF CENTRAL PORTION OF LEFT BREAST IN FEMALE, ESTROGEN RECEPTOR POSITIVE: ICD-10-CM

## 2024-12-17 PROCEDURE — 77061 BREAST TOMOSYNTHESIS UNI: CPT | Mod: 26,RT,, | Performed by: STUDENT IN AN ORGANIZED HEALTH CARE EDUCATION/TRAINING PROGRAM

## 2024-12-17 PROCEDURE — 77065 DX MAMMO INCL CAD UNI: CPT | Mod: 26,RT,, | Performed by: STUDENT IN AN ORGANIZED HEALTH CARE EDUCATION/TRAINING PROGRAM

## 2024-12-17 PROCEDURE — 77061 BREAST TOMOSYNTHESIS UNI: CPT | Mod: TC,RT

## 2024-12-31 ENCOUNTER — EXTERNAL CHRONIC CARE MANAGEMENT (OUTPATIENT)
Dept: PRIMARY CARE CLINIC | Facility: CLINIC | Age: 84
End: 2024-12-31
Payer: MEDICARE

## 2024-12-31 PROCEDURE — 99490 CHRNC CARE MGMT STAFF 1ST 20: CPT | Mod: PBBFAC,PO | Performed by: FAMILY MEDICINE

## 2024-12-31 PROCEDURE — 99439 CHRNC CARE MGMT STAF EA ADDL: CPT | Mod: PBBFAC,PO | Performed by: FAMILY MEDICINE

## 2025-01-31 ENCOUNTER — EXTERNAL CHRONIC CARE MANAGEMENT (OUTPATIENT)
Dept: PRIMARY CARE CLINIC | Facility: CLINIC | Age: 85
End: 2025-01-31
Payer: MEDICARE

## 2025-01-31 PROCEDURE — 99490 CHRNC CARE MGMT STAFF 1ST 20: CPT | Mod: S$PBB,,, | Performed by: FAMILY MEDICINE

## 2025-01-31 PROCEDURE — 99490 CHRNC CARE MGMT STAFF 1ST 20: CPT | Mod: PBBFAC,PO | Performed by: FAMILY MEDICINE

## 2025-02-13 ENCOUNTER — OFFICE VISIT (OUTPATIENT)
Dept: SURGERY | Facility: CLINIC | Age: 85
End: 2025-02-13
Payer: MEDICARE

## 2025-02-13 VITALS
HEART RATE: 66 BPM | HEIGHT: 62 IN | DIASTOLIC BLOOD PRESSURE: 93 MMHG | WEIGHT: 141.13 LBS | BODY MASS INDEX: 25.97 KG/M2 | SYSTOLIC BLOOD PRESSURE: 176 MMHG

## 2025-02-13 DIAGNOSIS — F03.90 DEMENTIA WITHOUT BEHAVIORAL DISTURBANCE: Primary | Chronic | ICD-10-CM

## 2025-02-13 DIAGNOSIS — C50.112 MALIGNANT NEOPLASM OF CENTRAL PORTION OF LEFT BREAST IN FEMALE, ESTROGEN RECEPTOR POSITIVE: ICD-10-CM

## 2025-02-13 DIAGNOSIS — Z17.0 MALIGNANT NEOPLASM OF CENTRAL PORTION OF LEFT BREAST IN FEMALE, ESTROGEN RECEPTOR POSITIVE: ICD-10-CM

## 2025-02-13 PROCEDURE — 99213 OFFICE O/P EST LOW 20 MIN: CPT | Mod: PBBFAC | Performed by: SURGERY

## 2025-02-13 PROCEDURE — 99999 PR PBB SHADOW E&M-EST. PATIENT-LVL III: CPT | Mod: PBBFAC,,, | Performed by: SURGERY

## 2025-02-13 NOTE — PROGRESS NOTES
Patient ID: Vanessa Vazquez is a 84 y.o. female.    Chief Complaint: Other (Breast exam)      HPI:  84-year-old female who underwent a left mastectomy and sentinel node biopsy for invasive ductal carcinoma in November of 2022.      She presents now for follow up    Pathology  Infiltrating ductal carcinoma  BREAST BIOMARKERS (PERFORMED ON THE PATIENT'S PREVIOUS BIOPSY SPECIMEN   [facility]-):         ER:   Positive (strong, greater than 95%)          VA:  Positive (strong, greater than 95%)          Her2:  Negative (0)          Ki67:  15%   Chemotherapy:  Only oral  Radiation completed February of 2023  1.  ANASTROZOLE DAILY started in 1/2023  Last mammogram was June of 2004    Staging  Pathology:  Invasive mammary carcinoma with lobular features  Clinical Stage:  Multicentric stage IA (cT1C cN0 MX)  Pathologic Stage:  Stage II A (pT3 pN1a MX)               Review of Systems   Constitutional:  Negative for appetite change, chills, fatigue, fever and unexpected weight change.   HENT:  Negative for hearing loss and rhinorrhea.    Eyes:  Negative for visual disturbance.   Respiratory:  Negative for apnea, cough, shortness of breath and wheezing.    Cardiovascular:  Negative for chest pain and palpitations.   Gastrointestinal:  Negative for abdominal distention, abdominal pain, blood in stool, constipation, diarrhea, nausea and vomiting.   Genitourinary:  Negative for dysuria, frequency and urgency.   Musculoskeletal:  Negative for arthralgias and neck pain.   Skin:  Negative for rash.        No breast complaints   Neurological:  Negative for seizures, weakness, numbness and headaches.   Hematological:  Negative for adenopathy. Does not bruise/bleed easily.   Psychiatric/Behavioral:  Negative for hallucinations. The patient is not nervous/anxious.        Current Outpatient Medications   Medication Sig Dispense Refill    anastrozole (ARIMIDEX) 1 mg Tab Take 1 tablet (1 mg total) by mouth once daily. 90 tablet 2     aspirin (ECOTRIN) 81 MG EC tablet Take 81 mg by mouth once daily.      calcium carbonate (OS-LÓPEZ) 500 mg calcium (1,250 mg) tablet Take 1 tablet (500 mg total) by mouth once daily. 90 tablet 3    donepeziL (ARICEPT) 10 MG tablet Take 1 tablet (10 mg total) by mouth every evening. 30 tablet 11    pravastatin (PRAVACHOL) 40 MG tablet TAKE ONE TABLET BY MOUTH ONE TIME DAILY 90 tablet 3    vitamin D (VITAMIN D3) 1000 units Tab Take 1,000 Units by mouth once daily.       No current facility-administered medications for this visit.       Review of patient's allergies indicates:  No Known Allergies    Past Medical History:   Diagnosis Date    Breast cancer     Left, radiation    CKD (chronic kidney disease), stage III     Dementia     Fibrocystic breast disease     Hearing loss     Hyperlipidemia     Osteoarthritis of thumb     Osteopenia     Stroke     Incidental finding in 2012.       Past Surgical History:   Procedure Laterality Date    BREAST BIOPSY Left 05/2018    Benign    CATARACT EXTRACTION EXTRACAPSULAR W/ INTRAOCULAR LENS IMPLANTATION      SENTINEL LYMPH NODE BIOPSY Left 11/15/2022    Procedure: BIOPSY, LYMPH NODE, SENTINEL;  Surgeon: Sabrina Leavitt MD;  Location: [facility] OR;  Service: General;  Laterality: Left;    SIMPLE MASTECTOMY Left 11/15/2022    Procedure: MASTECTOMY, SIMPLE;  Surgeon: Sabrina Leavitt MD;  Location: [facility] OR;  Service: General;  Laterality: Left;       Social History     Socioeconomic History    Marital status:    Tobacco Use    Smoking status: Never    Smokeless tobacco: Never   Substance and Sexual Activity    Alcohol use: No    Drug use: No    Sexual activity: Not Currently   Social History Narrative    The patient is  and the mother of 2 adult children. She retired from [employer] as .  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  She drives occasionally.      Social Drivers of Health     Financial Resource Strain: Low Risk  (5/22/2024)    Overall Financial Resource Strain (CARDIA)     Difficulty of Paying Living Expenses: Not hard at all   Food Insecurity: No Food Insecurity (5/22/2024)    Hunger Vital Sign     Worried About Running Out of Food in the Last Year: Never true     Ran Out of Food in the Last Year: Never true   Transportation Needs: Unknown (5/22/2024)    PRAPARE - Transportation     Lack of Transportation (Medical): No   Physical Activity: Sufficiently Active (5/22/2024)    Exercise Vital Sign     Days of Exercise per Week: 7 days     Minutes of Exercise per Session: 30 min   Stress: No Stress Concern Present (5/22/2024)    [place] of Occupational [employer] - Occupational Stress Questionnaire     Feeling of Stress : Not at all   Housing Stability: Unknown (5/15/2023)    Housing Stability Vital Sign     Unable to Pay for Housing in the Last Year: No     Unstable Housing in the Last Year: No       There were no vitals filed for this visit.    Physical Exam  Vitals reviewed. Exam conducted with a chaperone present.   Constitutional:       Appearance: She is well-developed.   HENT:      Head: Normocephalic.   Eyes:      Pupils: Pupils are equal, round, and reactive to light.   Neck:      Thyroid: No thyromegaly.      Vascular: No JVD.      Trachea: No tracheal deviation.   Cardiovascular:      Rate and Rhythm: Normal rate and regular rhythm.      Heart sounds: Normal heart sounds.   Pulmonary:      Breath sounds: Normal breath sounds. No wheezing.   Abdominal:      General: Bowel sounds are normal. There is no distension.      Palpations: Abdomen is soft. Abdomen is not rigid. There is no mass.      Tenderness: There is no abdominal tenderness. There is no guarding or rebound.   Musculoskeletal:         General: Normal range of motion.   Lymphadenopathy:      Cervical: No cervical adenopathy.   Skin:     General: Skin is warm and dry.       Findings: No erythema or rash.      Comments: Left breast that has absent.  Mastectomy site is well healed.  No nodularities.  No axillary adenopathy.      Right breast shows no skin changes, masses, nipple discharge nor axillary adenopathy   Neurological:      Mental Status: She is oriented to person, place, and time.     Mammo Digital Diagnostic Right with Enrique  Order: [id number]  Status: Final result       Visible to patient: Yes (not seen)       Next appt: 06/19/2025 at 01:15 PM in Dermatology (Ruthy Malagon MD)       Dx: Malignant neoplasm of central portion...    0 Result Notes  Assessment    Overall Right   2 - Benign 2 - Benign   Breast Density     Overall   Breast Composition b - Scattered fibroglandular density   Details    Reading Physician Reading Date Result Priority   Miguelito Mayer MD  [phone] 12/17/2024 Routine     Physician Responsible for MQSA Outcome Reason    Miguelito Mayer MD Signed      Narrative & Impression     Facility:  [facility] [address] [place] [id number] [phone]     Name: Vanessa Vazquez     MRN: [id number]     Result:   Mammo Digital Diagnostic Right with Enrique     History:  Patient is 84 y.o. and is seen for malignant neoplasm of central portion of left breast in female, estrogen receptor positive.     Films Compared:  Compared to: 12/18/2023 Mammo Digital Diagnostic Right with Enrique, 09/14/2022 Mammo Digital Screening Bilat w/ Enrique, and 09/03/2021 Mammo Digital Screening Bilat w/ Enrique     Findings:  This procedure was performed using tomosynthesis. Computer-aided detection was utilized in the interpretation of this examination.        Right  There are scattered areas of fibroglandular density. There is no evidence of suspicious masses, calcifications, or other abnormal findings in the right breast.     Vascular calcifications.     Impression:  There is no mammographic evidence of malignancy in the right breast.     BI-RADS  Category:   Overall: 2 - Benign        Recommendation:  Routine screening mammogram in 1 year, or as clinically indicated.       Assessment & Plan:    History of left breast cancer, status post mastectomy, radiation and is now on Arimidex therapy    No mammographic or clinical evidence of right breast cancer or recurrent left breast cancer.      Follow up in 6 months for clinical exam.      Patient will need a right-sided mammogram in December of 2025.      I feel that the patient and her  need to make a decision about how much longer day we would like to proceed with breast cancer screening.  Although the patient has no significant comorbidities that would prohibit surgery she does have some underlying dementia.      This can be further addressed in a follow up visit in 6 months

## 2025-02-13 NOTE — PATIENT INSTRUCTIONS
You have no evidence of recurrent breast cancer on the left side and no evidence of any new breast cancer on the right side.      I would recommend that you follow up with us in 6 months.  If you ever notice any masses or nodules of your breasts are left chest wall please let us know      Our office phone numbers are  [phone] and

## 2025-02-28 ENCOUNTER — EXTERNAL CHRONIC CARE MANAGEMENT (OUTPATIENT)
Dept: PRIMARY CARE CLINIC | Facility: CLINIC | Age: 85
End: 2025-02-28
Payer: MEDICARE

## 2025-02-28 PROCEDURE — 99490 CHRNC CARE MGMT STAFF 1ST 20: CPT | Mod: PBBFAC,PO | Performed by: FAMILY MEDICINE

## 2025-03-31 ENCOUNTER — EXTERNAL CHRONIC CARE MANAGEMENT (OUTPATIENT)
Dept: PRIMARY CARE CLINIC | Facility: CLINIC | Age: 85
End: 2025-03-31
Payer: MEDICARE

## 2025-03-31 PROCEDURE — 99490 CHRNC CARE MGMT STAFF 1ST 20: CPT | Mod: PBBFAC,PO | Performed by: FAMILY MEDICINE

## 2025-04-30 ENCOUNTER — EXTERNAL CHRONIC CARE MANAGEMENT (OUTPATIENT)
Dept: PRIMARY CARE CLINIC | Facility: CLINIC | Age: 85
End: 2025-04-30
Payer: MEDICARE

## 2025-04-30 PROCEDURE — 99490 CHRNC CARE MGMT STAFF 1ST 20: CPT | Mod: PBBFAC,PO | Performed by: FAMILY MEDICINE

## 2025-04-30 PROCEDURE — 99439 CHRNC CARE MGMT STAF EA ADDL: CPT | Mod: PBBFAC,PO | Performed by: FAMILY MEDICINE

## 2025-05-07 NOTE — PROGRESS NOTES
Subjective:       Patient ID: Vanessa Vazquez is a 84 y.o. female.    Chief Complaint:   1. Malignant neoplasm of central portion of left breast in female, estrogen receptor positive  Stage IB (pT3, pN1(sn), cM0, G1, ER+, VT+, HER2-, Oncotype DX score: 13)      2. Osteopenia of hip, unspecified laterality          Current Treatment:  OP ANASTROZOLE DAILY started in 1/2023    DENOSUMAB (PROLIA) Q6M     Treatment History:  S/p mastectomy on 11/15/2022 by Dr. Leavitt        S/p XRT     HPI: This is an 84 year old  woman with osteopenia, CVA, hearing loss, stage 3 CKD, fibrocystic breast disease, hyperlipidemia, dementia, and osteoarthritis of her thumb who is seen in Hem/Onc for Stage IB breast cancer. Screening MMG in 9/2022 revealed an asymmetry/mass with architectural distortion persists in the upper inner breast. US demonstrated an irregular hypoechoic mass measuring 1.7 x 1.2 cm at the 10 o'clock position. Biopsy proved IDC, grade 1, ER >95%, VT >95%, Her2 negative. She underwent mastectomy on 11/15/2022 by Dr. Leavitt; pathology showed IDC, grade 1, 5cm, negative for LVI. 1/2 nodes positive for carcinoma, 2mm. Oncotype recurrence score is 13.     She was started on Arimidex 1mg po daily in 1/2023 to complete 10 years of therapy. DEXA in 9/2022 showed osteopenia, and she was started on Prolia every 6 months.     Her primary Hematologist/Oncologist is Dr. Milton.    Interval History: Patient presents for follow up on Arimidex and Prolia; She is scheduled to receive C5D1 of Prolia today. She presents with her  and has no complaints today. She is adherent to Arimidex and calcium + vitamin D supplement. CBC unremarkable. CMP with improvement in eGFR. Ca 9.8. Will proceed with Prolia.     Reviewed labs with patient:   CBC:   Recent Labs   Lab 05/09/25  0941   WBC 7.12   RBC 4.39   HGB 12.8   HCT 39.4   Platelet Count 258   MCV 90   MCH 29.2   MCHC 32.5     CMP:  Recent Labs   Lab 05/09/25  0941    Glucose 167 H   Calcium 9.8   Albumin 3.9   Protein Total 8.1   Sodium 141   Potassium 4.2   CO2 23   Chloride 107   BUN 16   Creatinine 1.1   ALP 63   ALT 18   AST 19   Bilirubin Total 0.3     Mammo Digital Diagnostic Right with Enrique  Narrative: Facility:  [facility] [address]  [place] [address]  [phone]    Name: Vanessa Vazquez    MRN: [id number]    Result:   Mammo Digital Diagnostic Right with Enrique     History:  Patient is 84 y.o. and is seen for malignant neoplasm of central portion   of left breast in female, estrogen receptor positive.    Films Compared:  Compared to: 12/18/2023 Mammo Digital Diagnostic Right with Enrique,   09/14/2022 Mammo Digital Screening Bilat w/ Enrique, and 09/03/2021 Mammo   Digital Screening Bilat w/ Enrique     Findings:  This procedure was performed using tomosynthesis. Computer-aided detection   was utilized in the interpretation of this examination.    Right  There are scattered areas of fibroglandular density. There is no evidence   of suspicious masses, calcifications, or other abnormal findings in the   right breast.    Vascular calcifications.  Impression: There is no mammographic evidence of malignancy in the right breast.    BI-RADS Category:   Overall: 2 - Benign       Recommendation:  Routine screening mammogram in 1 year, or as clinically indicated.    Miguelito Mayer MD    Social History     Socioeconomic History    Marital status:    Tobacco Use    Smoking status: Never    Smokeless tobacco: Never   Substance and Sexual Activity    Alcohol use: No    Drug use: No    Sexual activity: Not Currently   Social History Narrative    The patient is  and the mother of 2 adult children. She retired from [employer] as .  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  She drives occasionally.     Social Drivers of Health     Financial Resource  Strain: Low Risk  (5/22/2024)    Overall Financial Resource Strain (CARDIA)     Difficulty of Paying Living Expenses: Not hard at all   Food Insecurity: No Food Insecurity (5/22/2024)    Hunger Vital Sign     Worried About Running Out of Food in the Last Year: Never true     Ran Out of Food in the Last Year: Never true   Transportation Needs: Unknown (5/22/2024)    PRAPARE - Transportation     Lack of Transportation (Medical): No   Physical Activity: Sufficiently Active (5/22/2024)    Exercise Vital Sign     Days of Exercise per Week: 7 days     Minutes of Exercise per Session: 30 min   Stress: No Stress Concern Present (5/22/2024)    [employer] - Occupational Stress Questionnaire     Feeling of Stress : Not at all   Housing Stability: Unknown (5/15/2023)    Housing Stability Vital Sign     Unable to Pay for Housing in the Last Year: No     Unstable Housing in the Last Year: No     Past Medical History:   Diagnosis Date    Breast cancer     Left, radiation    CKD (chronic kidney disease), stage III     Dementia     Fibrocystic breast disease     Hearing loss     Hyperlipidemia     Osteoarthritis of thumb     Osteopenia     Stroke     Incidental finding in 2012.     Family History   Problem Relation Name Age of Onset    Glaucoma Brother      Hyperlipidemia Mother      Heart disease Father      Osteoporosis Cousin      Coronary artery disease Maternal Uncle       Past Surgical History:   Procedure Laterality Date    BREAST BIOPSY Left 05/2018    Benign    CATARACT EXTRACTION EXTRACAPSULAR W/ INTRAOCULAR LENS IMPLANTATION      SENTINEL LYMPH NODE BIOPSY Left 11/15/2022    Procedure: BIOPSY, LYMPH NODE, SENTINEL;  Surgeon: Sabrina Leavitt MD;  Location: [facility] OR;  Service: General;  Laterality: Left;    SIMPLE MASTECTOMY Left 11/15/2022    Procedure: MASTECTOMY, SIMPLE;  Surgeon: Sabrina Leavitt MD;  Location: [facility] OR;  Service: General;  Laterality: Left;     Review of Systems    Constitutional:  Negative for appetite change and fatigue.   HENT:  Negative for mouth sores, rhinorrhea and sore throat.    Eyes: Negative.    Respiratory: Negative.     Cardiovascular: Negative.    Gastrointestinal:  Negative for constipation, diarrhea, nausea and vomiting.   Endocrine: Negative.    Genitourinary: Negative.  Negative for hot flashes.   Musculoskeletal: Negative.    Integumentary:  Negative.   Allergic/Immunologic: Negative.    Neurological:  Negative for weakness and numbness.   Hematological: Negative.    Psychiatric/Behavioral: Negative.         Medication List with Changes/Refills   Current Medications    ANASTROZOLE (ARIMIDEX) 1 MG TAB    Take 1 tablet (1 mg total) by mouth once daily.    ASPIRIN (ECOTRIN) 81 MG EC TABLET    Take 81 mg by mouth once daily.    CALCIUM CARBONATE (OS-LÓPEZ) 500 MG CALCIUM (1,250 MG) TABLET    Take 1 tablet (500 mg total) by mouth once daily.    DONEPEZIL (ARICEPT) 10 MG TABLET    Take 1 tablet (10 mg total) by mouth every evening.    PRAVASTATIN (PRAVACHOL) 40 MG TABLET    TAKE ONE TABLET BY MOUTH ONE TIME DAILY    VITAMIN D (VITAMIN D3) 1000 UNITS TAB    Take 1,000 Units by mouth once daily.     Objective:     Vitals:    05/09/25 1037   BP: (!) 144/64   Pulse: 65     Physical Exam  Vitals reviewed.   Constitutional:       Appearance: Normal appearance.   HENT:      Head: Normocephalic.      Mouth/Throat:      Comments:     Eyes:      Extraocular Movements: Extraocular movements intact.      Pupils: Pupils are equal, round, and reactive to light.   Cardiovascular:      Rate and Rhythm: Normal rate and regular rhythm.      Heart sounds: Normal heart sounds.   Pulmonary:      Effort: Pulmonary effort is normal.      Breath sounds: Normal breath sounds.   Abdominal:      General: Bowel sounds are normal.      Palpations: Abdomen is soft.      Comments: rounded     Genitourinary:     Comments: deferred    Musculoskeletal:         General: Normal range of motion.       Cervical back: Normal range of motion and neck supple.   Skin:     General: Skin is warm and dry.   Neurological:      Mental Status: She is alert and oriented to person, place, and time.      Comments: Repeats herself and asks the same questions over and over as is typical of dementia   Psychiatric:         Behavior: Behavior normal.         Thought Content: Thought content normal.        (2) Ambulatory and capable of self care, unable to carry out work activity, up and about > 50% or waking hours  Assessment:     Problem List Items Addressed This Visit          Oncology    Malignant neoplasm of central portion of left breast in female, estrogen receptor positive - Primary    Diagnosed on screening MMG. S/p mastectomy in 11/2022 followed by XRT. currenlty on Arimidex daily.             Orthopedic    Osteopenia of hip    Noted on DEXA in 9/2022. Currently on Prolia every 6 months. Improved with recent DEXA.           Plan:     Malignant neoplasm of central portion of left breast in female, estrogen receptor positive    Osteopenia of hip, unspecified laterality    Labs reviewed.   Continue Arimidex with calcium + vitamin D daily; refill sent to patient's pharmacy.   Ok to proceed with C4D1 of Prolia next week; Ca ++ 9.8.  Follow up in 6 monthswith CBC and Comprehensive Metabolic Panel prior to C5D1.  Repeat DEXA in 9/2026.   Surveillance MMG due 12/2025.     Per NCCN Guidelines BINV-17:  H&P 1-4 times per year as clinically appropriate for 5 years then annually  Periodic screening for changes in family history and genetic testing indications and referral to genetic counseling as indicated  Educate, monitor, and refer for lymphedema management  MMG every 12 months  Routine imaging of reconstructed breast is not indicated  For patients receiving anthracycline-based therapy, see NCCN Guidelines for Survivorship for echocardiogram recommendations  In the absence of clinical signs and symptoms suggestive of recurrent  disease, there is no indication for lab or imaging studies for metastases screening    Route Chart for Scheduling    Med Onc Chart Routing      Follow up with physician    Follow up with ARNIE 6 months.   Infusion scheduling note    Injection scheduling note in 6 months for Prolia   Labs CBC and CMP   Scheduling:  Preferred lab:  Lab interval:  in 6 months   Imaging None      Pharmacy appointment No pharmacy appointment needed      Other referrals         No additional referrals needed         I will review assessment/plan with collaborating physician.      ORTEGA Potts

## 2025-05-09 ENCOUNTER — OFFICE VISIT (OUTPATIENT)
Dept: HEMATOLOGY/ONCOLOGY | Facility: CLINIC | Age: 85
End: 2025-05-09
Payer: MEDICARE

## 2025-05-09 ENCOUNTER — INFUSION (OUTPATIENT)
Dept: INFUSION THERAPY | Facility: HOSPITAL | Age: 85
End: 2025-05-09
Attending: INTERNAL MEDICINE
Payer: MEDICARE

## 2025-05-09 VITALS
DIASTOLIC BLOOD PRESSURE: 64 MMHG | HEART RATE: 65 BPM | OXYGEN SATURATION: 99 % | WEIGHT: 141.13 LBS | SYSTOLIC BLOOD PRESSURE: 144 MMHG | BODY MASS INDEX: 25.97 KG/M2 | HEIGHT: 62 IN

## 2025-05-09 DIAGNOSIS — M81.8 OSTEOPOROSIS DUE TO AROMATASE INHIBITOR: Primary | ICD-10-CM

## 2025-05-09 DIAGNOSIS — T38.6X5A OSTEOPOROSIS DUE TO AROMATASE INHIBITOR: Primary | ICD-10-CM

## 2025-05-09 DIAGNOSIS — C50.112 MALIGNANT NEOPLASM OF CENTRAL PORTION OF LEFT BREAST IN FEMALE, ESTROGEN RECEPTOR POSITIVE: Primary | ICD-10-CM

## 2025-05-09 DIAGNOSIS — M85.859 OSTEOPENIA OF HIP, UNSPECIFIED LATERALITY: ICD-10-CM

## 2025-05-09 DIAGNOSIS — Z17.0 MALIGNANT NEOPLASM OF CENTRAL PORTION OF LEFT BREAST IN FEMALE, ESTROGEN RECEPTOR POSITIVE: Primary | ICD-10-CM

## 2025-05-09 PROCEDURE — 99999 PR PBB SHADOW E&M-EST. PATIENT-LVL III: CPT | Mod: PBBFAC,,, | Performed by: NURSE PRACTITIONER

## 2025-05-09 PROCEDURE — 99213 OFFICE O/P EST LOW 20 MIN: CPT | Mod: PBBFAC,25 | Performed by: NURSE PRACTITIONER

## 2025-05-09 PROCEDURE — 63600175 PHARM REV CODE 636 W HCPCS: Mod: JZ,TB | Performed by: NURSE PRACTITIONER

## 2025-05-09 PROCEDURE — 96372 THER/PROPH/DIAG INJ SC/IM: CPT

## 2025-05-09 RX ORDER — ANASTROZOLE 1 MG/1
1 TABLET ORAL DAILY
Qty: 90 TABLET | Refills: 3 | Status: SHIPPED | OUTPATIENT
Start: 2025-05-09

## 2025-05-09 RX ADMIN — DENOSUMAB 60 MG: 60 INJECTION SUBCUTANEOUS at 11:05

## 2025-05-09 NOTE — DISCHARGE INSTRUCTIONS
Thank you for allowing me to care for you today,  ABI SaucedoN, RN    [facility]  [address] [address]  [phone] phone     [phone] fax  Hours of Operation: Monday- Friday 7:00am- 5:30pm  After hours phone  [phone]  Hematology / Oncology Physicians on call      RAUL Angela Dr., Dr., Dr., Dr., Dr., Dr., Dr., Dr., Dr., Dr., Dr., Dr., Dr., Dr., Dr., MOUNIKA Claros, MOUNIKA Jimenez, MOUNIKA Sesay, NP  Please call with any concerns regarding your appointment today.   FALL PREVENTION   Falls often occur due to slipping, tripping or losing your balance. Here are ways to reduce your risk of falling again.   Was there anything that caused your fall that can be fixed, removed or replaced?   Make your home safe by keeping walkways clear of objects you may trip over.   Use non-slip pads under rugs.   Do not walk in poorly lit areas.   Do not stand on chairs or wobbly ladders.   Use caution when reaching overhead or looking upward. This position can cause a loss of balance.   Be sure your shoes fit properly, have non-slip bottoms and are in good condition.   Be cautious when going up and down stairs, curbs, and when walking on uneven sidewalks.   If your balance is poor, consider using a cane or walker.   If your fall was related to alcohol use, stop or limit alcohol intake.   If your fall was related to use of sleeping medicines, talk to your doctor about this. You may need to reduce your dosage at bedtime if you awaken during the night to go to the bathroom.   To reduce the need for nighttime bathroom trips:   Avoid drinking fluids for several hours before going to bed   Empty your bladder before going to bed   Men can keep a urinal at the bedside   © 5675-7653 [employer], [address] [place] [address], [place] [address]. All rights reserved. This information is not intended as a  substitute for professional medical care. Always follow your healthcare professional's instructions.

## 2025-05-09 NOTE — NURSING
1129: Prolia Injection given without difficulties.Bandaid applied. Patient instructed to stay in the clinic for 15 minutes. Patient verbalized understanding and will notify nurse with any complaints.

## 2025-05-27 ENCOUNTER — OFFICE VISIT (OUTPATIENT)
Dept: FAMILY MEDICINE | Facility: CLINIC | Age: 85
End: 2025-05-27
Payer: MEDICARE

## 2025-05-27 VITALS
SYSTOLIC BLOOD PRESSURE: 110 MMHG | RESPIRATION RATE: 18 BRPM | DIASTOLIC BLOOD PRESSURE: 60 MMHG | OXYGEN SATURATION: 98 % | TEMPERATURE: 98 F | WEIGHT: 139.31 LBS | HEART RATE: 66 BPM | BODY MASS INDEX: 25.48 KG/M2

## 2025-05-27 DIAGNOSIS — C77.3 SECONDARY AND UNSPECIFIED MALIGNANT NEOPLASM OF AXILLA AND UPPER LIMB LYMPH NODES: ICD-10-CM

## 2025-05-27 DIAGNOSIS — Z86.73 HISTORY OF CVA (CEREBROVASCULAR ACCIDENT): ICD-10-CM

## 2025-05-27 DIAGNOSIS — M81.8 OSTEOPOROSIS DUE TO AROMATASE INHIBITOR: ICD-10-CM

## 2025-05-27 DIAGNOSIS — T38.6X5A OSTEOPOROSIS DUE TO AROMATASE INHIBITOR: ICD-10-CM

## 2025-05-27 DIAGNOSIS — C50.112 MALIGNANT NEOPLASM OF CENTRAL PORTION OF LEFT BREAST IN FEMALE, ESTROGEN RECEPTOR POSITIVE: ICD-10-CM

## 2025-05-27 DIAGNOSIS — Z17.0 MALIGNANT NEOPLASM OF CENTRAL PORTION OF LEFT BREAST IN FEMALE, ESTROGEN RECEPTOR POSITIVE: ICD-10-CM

## 2025-05-27 DIAGNOSIS — E78.1 HYPERTRIGLYCERIDEMIA: Chronic | ICD-10-CM

## 2025-05-27 DIAGNOSIS — Z00.00 ENCOUNTER FOR MEDICARE ANNUAL WELLNESS EXAM: Primary | ICD-10-CM

## 2025-05-27 DIAGNOSIS — N18.32 STAGE 3B CHRONIC KIDNEY DISEASE: Chronic | ICD-10-CM

## 2025-05-27 DIAGNOSIS — F03.90 DEMENTIA WITHOUT BEHAVIORAL DISTURBANCE: Chronic | ICD-10-CM

## 2025-05-27 PROCEDURE — 99213 OFFICE O/P EST LOW 20 MIN: CPT | Mod: PBBFAC,PO | Performed by: NURSE PRACTITIONER

## 2025-05-27 PROCEDURE — 99999 PR PBB SHADOW E&M-EST. PATIENT-LVL III: CPT | Mod: PBBFAC,,, | Performed by: NURSE PRACTITIONER

## 2025-05-27 NOTE — PROGRESS NOTES
Vanessa Vazquez presented for a  Medicare AWV and comprehensive Health Risk Assessment today. The following components were reviewed and updated:  Patient accompanied by   , who was present throughout the visit and aided in information gathering.        Medical history  Family History  Social history  Allergies and Current Medications  Health Risk Assessment  Health Maintenance  Care Team         ** See Completed Assessments for Annual Wellness Visit within the encounter summary.**         The following assessments were completed:  Living Situation  CAGE  Depression Screening  Timed Get Up and Go  Whisper Test  Cognitive Function Screening  Nutrition Screening  ADL Screening  PAQ Screening      Opioid documentation:      Patient does not have a current opioid prescription.        Vitals:    05/27/25 1109   BP: 110/60   Patient Position: Sitting   Pulse: 66   Resp: 18   Temp: 97.8 °F (36.6 °C)   SpO2: 98%   Weight: 63.2 kg (139 lb 5.3 oz)     Body mass index is 25.48 kg/m².  Physical Exam  Vitals and nursing note reviewed.   Constitutional:       Appearance: Normal appearance. She is well-developed.   HENT:      Head: Normocephalic and atraumatic.   Eyes:      Pupils: Pupils are equal, round, and reactive to light.   Neck:      Vascular: No carotid bruit.   Cardiovascular:      Rate and Rhythm: Normal rate and regular rhythm.      Pulses: Normal pulses.      Heart sounds: Normal heart sounds. No murmur heard.     No gallop.   Pulmonary:      Effort: Pulmonary effort is normal.      Breath sounds: Normal breath sounds.   Abdominal:      General: Bowel sounds are normal. There is no distension.      Palpations: Abdomen is soft.      Tenderness: There is no abdominal tenderness.   Musculoskeletal:         General: No tenderness. Normal range of motion.   Skin:     General: Skin is warm and dry.   Neurological:      Mental Status: She is alert.      Motor: No abnormal muscle tone.      Gait: Gait normal.    Psychiatric:         Speech: Speech normal.         Behavior: Behavior normal.         Thought Content: Thought content normal.         Cognition and Memory: Cognition is impaired. Memory is impaired.         Judgment: Judgment normal.       Current Outpatient Medications   Medication Instructions    anastrozole (ARIMIDEX) 1 mg, Oral, Daily    aspirin (ECOTRIN) 81 mg, Daily    calcium carbonate (OS-LÓPEZ) 500 mg, Oral, Daily    donepeziL (ARICEPT) 10 mg, Oral, Nightly    pravastatin (PRAVACHOL) 40 MG tablet TAKE ONE TABLET BY MOUTH ONE TIME DAILY    vitamin D (VITAMIN D3) 1,000 Units, Daily               Diagnoses and health risks identified today and associated recommendations/orders:    1. Encounter for Medicare annual wellness exam  Has urine leakage ever interrupted your daily activities or sleep? No  Do you think you could use some help to better manage urine leakage?No      2. Dementia without behavioral disturbance  Chronic and Stable on Aricept-  memory has decline- more dependent with ADL's. Continue current treatment plan as previously prescribed with your PCP      3. Malignant neoplasm of central portion of left breast in female, estrogen receptor positive  10/22 newly diagnosed left invasive duct cell carcinoma   11/15/22- Left simple breast mastectomy  Chronic/ Monitored/Stable on Arimidex as directed.  Dx mammogram 12/17/ 2024  Followed by hem/oncologist     4. Osteoporosis due to aromatase inhibitor  Chronic and Stable on Re clast and vitamin D. Continue current treatment plan as previously prescribed with your rheum    5. Secondary and unspecified malignant neoplasm of axilla and upper limb lymph nodes  10/22 newly diagnosed left invasive duct cell carcinoma   11/15/22- Left simple breast mastectomy  Chronic/ Monitored/Stable on Arimidex as directed.  Followed by hem/oncologist     6. Stage 3b chronic kidney disease  Chronic and Stable. Continue current treatment plan as previously prescribed  with your PCP    7. triglyceride  Chronic and Stable on Pravastatin  and diet Continue current treatment plan as previously prescribed with your PCP    8. History of CVA (cerebrovascular accident)  Chronic and Stable  BP and chol. Continue current treatment plan as previously prescribed with your PCP    I offered to discuss advanced care planning, including how to pick a person who would make decisions for you if you were unable to make them for yourself, called a health care power of , and what kind of decisions you might make such as use of life sustaining treatments such as ventilators and tube feeding when faced with a life limiting illness recorded on a living will that they will need to know. (How you want to be cared for as you near the end of your natural life)     X  Patient has advanced directives on file, which we reviewed, and they do not wish to make changes.    Provided Vanessa with a 5-10 year written screening schedule and personal prevention plan. Recommendations were developed using the USPSTF age appropriate recommendations. Education, counseling, and referrals were provided as needed. After Visit Summary printed and given to patient which includes a list of additional screenings\tests needed.    Follow up in about 1 year (around 5/27/2026).    Lorri Fairabnks NP

## 2025-05-27 NOTE — PATIENT INSTRUCTIONS
Counseling and Referral of Other Preventative  (Italic type indicates deductible and co-insurance are waived)    Patient Name: Vanessa Vazquez  Today's Date: 5/27/2025    Health Maintenance       Date Due Completion Date    Shingles Vaccine (1 of 2) Never done ---    TETANUS VACCINE 01/03/2022 1/3/2012    COVID-19 Vaccine (5 - 2024-25 season) 09/01/2024 12/12/2023    Lipid Panel 08/16/2025 8/16/2024    DEXA Scan 09/19/2027 9/19/2024        No orders of the defined types were placed in this encounter.    The following information is provided to all patients.  This information is to help you find resources for any of the problems found today that may be affecting your health:                  Living healthy guide: www.[facility].[place].gov      Understanding Diabetes: www.diabetes.org      Eating healthy: www.cdc.gov/healthyweight      CDC home safety checklist: www.cdc.gov/steadi/patient.html      Agency on Aging: www.goea.[place].[facility]      Alcoholics anonymous (AA): www.aa.org      Physical Activity: www.paul.nih.gov/el8slax      Tobacco use: www.quitwithusla.org

## 2025-05-31 ENCOUNTER — EXTERNAL CHRONIC CARE MANAGEMENT (OUTPATIENT)
Dept: PRIMARY CARE CLINIC | Facility: CLINIC | Age: 85
End: 2025-05-31
Payer: MEDICARE

## 2025-05-31 PROCEDURE — 99439 CHRNC CARE MGMT STAF EA ADDL: CPT | Mod: S$PBB,,, | Performed by: FAMILY MEDICINE

## 2025-05-31 PROCEDURE — 99439 CHRNC CARE MGMT STAF EA ADDL: CPT | Mod: PBBFAC,PO | Performed by: FAMILY MEDICINE

## 2025-05-31 PROCEDURE — 99490 CHRNC CARE MGMT STAFF 1ST 20: CPT | Mod: PBBFAC,PO | Performed by: FAMILY MEDICINE

## 2025-05-31 PROCEDURE — 99490 CHRNC CARE MGMT STAFF 1ST 20: CPT | Mod: S$PBB,,, | Performed by: FAMILY MEDICINE

## 2025-06-19 ENCOUNTER — OFFICE VISIT (OUTPATIENT)
Dept: DERMATOLOGY | Facility: CLINIC | Age: 85
End: 2025-06-19
Payer: MEDICARE

## 2025-06-19 DIAGNOSIS — L82.1 SEBORRHEIC KERATOSIS: Primary | ICD-10-CM

## 2025-06-19 DIAGNOSIS — Z12.83 SCREENING, MALIGNANT NEOPLASM, SKIN: ICD-10-CM

## 2025-06-19 DIAGNOSIS — D18.01 HEMANGIOMA OF SKIN: ICD-10-CM

## 2025-06-19 PROCEDURE — 99212 OFFICE O/P EST SF 10 MIN: CPT | Mod: PBBFAC | Performed by: DERMATOLOGY

## 2025-06-19 PROCEDURE — 99999 PR PBB SHADOW E&M-EST. PATIENT-LVL II: CPT | Mod: PBBFAC,,, | Performed by: DERMATOLOGY

## 2025-06-19 PROCEDURE — 99213 OFFICE O/P EST LOW 20 MIN: CPT | Mod: S$PBB,,, | Performed by: DERMATOLOGY

## 2025-06-19 NOTE — PROGRESS NOTES
Subjective:      Patient ID:  Vanessa Vazquez is a 84 y.o. female who presents for   Chief Complaint   Patient presents with    Skin Check     Fbse.      Hx of dementia, SK's and angioma, last seen on 3/19/24.   c/o irritated lesion of the right forehead that pt picks at. No tx tried.    This is a high risk patient here to check for the development of new lesions.    Denies bleeding, tender, growing, or concerning lesions.     The patient denies personal or family history of skin cancer.            Review of Systems   Constitutional:  Negative for fever and chills.   Gastrointestinal:  Negative for nausea and vomiting.   Skin:  Positive for activity-related sunscreen use. Negative for daily sunscreen use and recent sunburn.   Hematologic/Lymphatic: Does not bruise/bleed easily.       Objective:   Physical Exam   Constitutional: She appears well-developed and well-nourished. No distress.   Neurological: She is alert and oriented to person, place, and time. She is not disoriented.   Psychiatric: She has a normal mood and affect.   Skin:   Areas Examined (abnormalities noted in diagram):   Scalp / Hair Palpated and Inspected  Head / Face Inspection Performed  Neck Inspection Performed  Chest / Axilla Inspection Performed  Abdomen Inspection Performed  Back Inspection Performed  RUE Inspected  LUE Inspection Performed  RLE Inspected  LLE Inspection Performed  Nails and Digits Inspection Performed                 Diagram Legend      Vascular papule c/w angioma      Pigmented verrucoid papule/plaque c/w seborrheic keratosis       Assessment / Plan:        Seborrheic keratosis  Reassurance given. Discussed diagnosis and that lesions are benign.  AAD handout given.     Hemangioma of skin  Screening, malignant neoplasm, skin  Total body skin examination performed today including at least 12 points as noted in physical examination. No lesions suspicious for malignancy noted.             Follow up if symptoms worsen  or fail to improve.

## 2025-06-30 ENCOUNTER — EXTERNAL CHRONIC CARE MANAGEMENT (OUTPATIENT)
Dept: PRIMARY CARE CLINIC | Facility: CLINIC | Age: 85
End: 2025-06-30
Payer: MEDICARE

## 2025-06-30 PROCEDURE — 99490 CHRNC CARE MGMT STAFF 1ST 20: CPT | Mod: S$PBB,,, | Performed by: FAMILY MEDICINE

## 2025-06-30 PROCEDURE — 99490 CHRNC CARE MGMT STAFF 1ST 20: CPT | Mod: PBBFAC,PO | Performed by: FAMILY MEDICINE

## 2025-07-31 ENCOUNTER — EXTERNAL CHRONIC CARE MANAGEMENT (OUTPATIENT)
Dept: PRIMARY CARE CLINIC | Facility: CLINIC | Age: 85
End: 2025-07-31
Payer: MEDICARE

## 2025-07-31 PROCEDURE — 99490 CHRNC CARE MGMT STAFF 1ST 20: CPT | Mod: PBBFAC,PO | Performed by: FAMILY MEDICINE

## 2025-07-31 PROCEDURE — 99490 CHRNC CARE MGMT STAFF 1ST 20: CPT | Mod: S$PBB,,, | Performed by: FAMILY MEDICINE

## 2025-08-19 ENCOUNTER — OFFICE VISIT (OUTPATIENT)
Dept: SURGERY | Facility: CLINIC | Age: 85
End: 2025-08-19
Payer: MEDICARE

## 2025-08-19 VITALS — BODY MASS INDEX: 25.4 KG/M2 | HEIGHT: 62 IN | RESPIRATION RATE: 16 BRPM | WEIGHT: 138 LBS

## 2025-08-19 DIAGNOSIS — Z71.89 COUNSELING ON HEALTH PROMOTION AND DISEASE PREVENTION: ICD-10-CM

## 2025-08-19 DIAGNOSIS — Z17.0 MALIGNANT NEOPLASM OF CENTRAL PORTION OF LEFT BREAST IN FEMALE, ESTROGEN RECEPTOR POSITIVE: Primary | ICD-10-CM

## 2025-08-19 DIAGNOSIS — C50.112 MALIGNANT NEOPLASM OF CENTRAL PORTION OF LEFT BREAST IN FEMALE, ESTROGEN RECEPTOR POSITIVE: Primary | ICD-10-CM

## 2025-08-19 DIAGNOSIS — Z12.31 ENCOUNTER FOR SCREENING MAMMOGRAM FOR MALIGNANT NEOPLASM OF BREAST: ICD-10-CM

## 2025-08-19 DIAGNOSIS — Z71.89 COUNSELING AND COORDINATION OF CARE: ICD-10-CM

## 2025-08-19 DIAGNOSIS — Z12.39 ENCOUNTER FOR SCREENING BREAST EXAMINATION: ICD-10-CM

## 2025-08-19 PROCEDURE — 99213 OFFICE O/P EST LOW 20 MIN: CPT | Mod: PBBFAC

## 2025-08-19 PROCEDURE — 99999 PR PBB SHADOW E&M-EST. PATIENT-LVL III: CPT | Mod: PBBFAC,,,

## (undated) DEVICE — ELECTRODE BLD EXT 6.50 ST DISP

## (undated) DEVICE — SUT SILK 2-0 STRANDS 30IN

## (undated) DEVICE — GOWN POLY REINF BRTH SLV XL

## (undated) DEVICE — SYR B-D DISP CONTROL 10CC100/C

## (undated) DEVICE — TOWEL OR DISP STRL BLUE 4/PK

## (undated) DEVICE — NDL SAFETY 25G X 1.5 ECLIPSE

## (undated) DEVICE — PACK BASIC SETUP SC BR

## (undated) DEVICE — DRAPE UTILITY W/ TAPE 20X30IN

## (undated) DEVICE — SPONGE DERMACEA GAUZE 4X4

## (undated) DEVICE — LINER SUC LID CANSTR 1500ML

## (undated) DEVICE — DRESSING ANTIMICROBIAL 1 INCH

## (undated) DEVICE — SYR 3CC LUER LOC

## (undated) DEVICE — SUT 2/0 30IN SILK BLK BRAI

## (undated) DEVICE — SYR 10CC LUER LOCK

## (undated) DEVICE — SPONGE GAUZE 16PLY 4X4

## (undated) DEVICE — NDL SAFETY 22G X 1.5 ECLIPSE

## (undated) DEVICE — SUT MONOCYRL 4-0 PS2 UND

## (undated) DEVICE — DRAPE THREE-QTR REINF 53X77IN

## (undated) DEVICE — SYS CLSR DERMABOND PRINEO 22CM

## (undated) DEVICE — SUT VICRYL 3-0 27 SH

## (undated) DEVICE — COVER PROBE STERILE CIVFLEX

## (undated) DEVICE — DRESSING TRANS 4X4 TEGADERM

## (undated) DEVICE — APPLIER CLIP LIAGCLIP 9.375IN

## (undated) DEVICE — COVER LIGHT HANDLE 80/CA

## (undated) DEVICE — Device

## (undated) DEVICE — APPLICATOR CHLORAPREP ORN 26ML

## (undated) DEVICE — SOL 9P NACL IRR PIC IL

## (undated) DEVICE — MANIFOLD 4 PORT

## (undated) DEVICE — GLOVE BIOGEL ECLIPSE SZ 6.5

## (undated) DEVICE — WAVEGUIDE BRITEFIELD DISP

## (undated) DEVICE — COVER CAMERA OPERATING ROOM

## (undated) DEVICE — GAUZE SPONGE 4X4 12PLY

## (undated) DEVICE — SKIN MARKER DEVON 160

## (undated) DEVICE — HANDSWITCH SUCTION COAG

## (undated) DEVICE — EVACUATOR WOUND BULB 100CC

## (undated) DEVICE — STAPLER SKIN PROXIMATE REG

## (undated) DEVICE — PACK DRAPE UNIVERSAL CONVERTOR

## (undated) DEVICE — CLIP LIGATING TITANIUM SMALL

## (undated) DEVICE — DRAPE ULTRASOUND PROBE HEAD

## (undated) DEVICE — SUT VICRYL 4-0 27 SH

## (undated) DEVICE — BLADE SURG #15 CARBON STEEL

## (undated) DEVICE — ELECTRODE BLADE W/SLEEVE 2.75

## (undated) DEVICE — ELECTRODE REM PLYHSV RETURN 9